# Patient Record
Sex: FEMALE | Race: WHITE | NOT HISPANIC OR LATINO | Employment: OTHER | URBAN - METROPOLITAN AREA
[De-identification: names, ages, dates, MRNs, and addresses within clinical notes are randomized per-mention and may not be internally consistent; named-entity substitution may affect disease eponyms.]

---

## 2017-12-14 ENCOUNTER — APPOINTMENT (OUTPATIENT)
Dept: PREADMISSION TESTING | Facility: HOSPITAL | Age: 72
End: 2017-12-14

## 2017-12-14 VITALS
HEART RATE: 74 BPM | HEIGHT: 64 IN | TEMPERATURE: 97.4 F | SYSTOLIC BLOOD PRESSURE: 122 MMHG | DIASTOLIC BLOOD PRESSURE: 75 MMHG | OXYGEN SATURATION: 94 % | RESPIRATION RATE: 16 BRPM | WEIGHT: 174.5 LBS | BODY MASS INDEX: 29.79 KG/M2

## 2017-12-14 LAB
ANION GAP SERPL CALCULATED.3IONS-SCNC: 13.3 MMOL/L
BUN BLD-MCNC: 14 MG/DL (ref 8–23)
BUN/CREAT SERPL: 18.2 (ref 7–25)
CALCIUM SPEC-SCNC: 9.2 MG/DL (ref 8.6–10.5)
CHLORIDE SERPL-SCNC: 100 MMOL/L (ref 98–107)
CO2 SERPL-SCNC: 23.7 MMOL/L (ref 22–29)
CREAT BLD-MCNC: 0.77 MG/DL (ref 0.57–1)
DEPRECATED RDW RBC AUTO: 42.9 FL (ref 37–54)
ERYTHROCYTE [DISTWIDTH] IN BLOOD BY AUTOMATED COUNT: 12.2 % (ref 11.7–13)
GFR SERPL CREATININE-BSD FRML MDRD: 74 ML/MIN/1.73
GLUCOSE BLD-MCNC: 176 MG/DL (ref 65–99)
HCT VFR BLD AUTO: 44.4 % (ref 35.6–45.5)
HGB BLD-MCNC: 14.9 G/DL (ref 11.9–15.5)
MCH RBC QN AUTO: 32.3 PG (ref 26.9–32)
MCHC RBC AUTO-ENTMCNC: 33.6 G/DL (ref 32.4–36.3)
MCV RBC AUTO: 96.3 FL (ref 80.5–98.2)
PLATELET # BLD AUTO: 121 10*3/MM3 (ref 140–500)
PMV BLD AUTO: 8.8 FL (ref 6–12)
POTASSIUM BLD-SCNC: 3.7 MMOL/L (ref 3.5–5.2)
RBC # BLD AUTO: 4.61 10*6/MM3 (ref 3.9–5.2)
SODIUM BLD-SCNC: 137 MMOL/L (ref 136–145)
WBC NRBC COR # BLD: 4.66 10*3/MM3 (ref 4.5–10.7)

## 2017-12-14 PROCEDURE — 93010 ELECTROCARDIOGRAM REPORT: CPT | Performed by: INTERNAL MEDICINE

## 2017-12-14 PROCEDURE — 85027 COMPLETE CBC AUTOMATED: CPT | Performed by: OPHTHALMOLOGY

## 2017-12-14 PROCEDURE — 80048 BASIC METABOLIC PNL TOTAL CA: CPT | Performed by: OPHTHALMOLOGY

## 2017-12-14 PROCEDURE — 36415 COLL VENOUS BLD VENIPUNCTURE: CPT

## 2017-12-14 PROCEDURE — 93005 ELECTROCARDIOGRAM TRACING: CPT

## 2017-12-14 RX ORDER — OSELTAMIVIR PHOSPHATE 75 MG/1
75 CAPSULE ORAL 2 TIMES DAILY
COMMUNITY
End: 2022-03-03

## 2017-12-14 RX ORDER — DEXTROMETHORPHAN HYDROBROMIDE AND PROMETHAZINE HYDROCHLORIDE 15; 6.25 MG/5ML; MG/5ML
SYRUP ORAL 4 TIMES DAILY PRN
COMMUNITY
End: 2022-03-03

## 2017-12-14 RX ORDER — CETIRIZINE HYDROCHLORIDE 10 MG/1
10 TABLET ORAL DAILY
COMMUNITY
End: 2022-03-03

## 2017-12-14 NOTE — DISCHARGE INSTRUCTIONS
Take the following medications the morning of surgery with a small sip of water:  NONE    ARRIVE AT 0800.        General Instructions:  • Do not eat solid food after midnight the night before surgery.  • You may drink clear liquids day of surgery but must stop at least one hour before your hospital arrival time.  • It is beneficial for you to have a clear drink that contains carbohydrates the day of surgery.  We suggest a 12 to 20 ounce bottle of Gatorade or Powerade for non-diabetic patients or a 12 to 20 ounce bottle of G2 or Powerade Zero for diabetic patients. (Pediatric patients, are not advised to drink a 12 to 20 ounce carbohydrate drink)    Clear liquids are liquids you can see through.  Nothing red in color.     Plain water                               Sports drinks  Sodas                                   Gelatin (Jell-O)  Fruit juices without pulp such as white grape juice and apple juice  Popsicles that contain no fruit or yogurt  Tea or coffee (no cream or milk added)  Gatorade / Powerade  G2 / Powerade Zero    • Infants may have breast milk up to four hours before surgery.  • Infants drinking formula may drink formula up to six hours before surgery.   • Patients who avoid smoking, chewing tobacco and alcohol for 4 weeks prior to surgery have a reduced risk of post-operative complications.  Quit smoking as many days before surgery as you can.  • Do not smoke, use chewing tobacco or drink alcohol the day of surgery.   • If applicable bring your C-PAP/ BI-PAP machine.  • Bring any papers given to you in the doctor’s office.  • Wear clean comfortable clothes and socks.  • Do not wear contact lenses or make-up.  Bring a case for your glasses.   • Bring crutches or walker if applicable.  • Remove all piercings.  Leave jewelry and any other valuables at home.  • The Pre-Admission Testing nurse will instruct you to bring medications if unable to obtain an accurate list in Pre-Admission Testing.        If you  were given a blood bank ID arm band remember to bring it with you the day of surgery.    Preventing a Surgical Site Infection:  • For 2 to 3 days before surgery, avoid shaving with a razor because the razor can irritate skin and make it easier to develop an infection.  • The night prior to surgery sleep in a clean bed with clean clothing.  Do not allow pets to sleep with you.  • Shower on the morning of surgery using a fresh bar of anti-bacterial soap (such as Dial) and clean washcloth.  Dry with a clean towel and dress in clean clothing.  • Ask your surgeon if you will be receiving antibiotics prior to surgery.  • Make sure you, your family, and all healthcare providers clean their hands with soap and water or an alcohol based hand  before caring for you or your wound.    Day of surgery:  Upon arrival, a Pre-op nurse and Anesthesiologist will review your health history, obtain vital signs, and answer questions you may have.  The only belongings needed at this time will be your home medications and if applicable your C-PAP/BI-PAP machine.  If you are staying overnight your family can leave the rest of your belongings in the car and bring them to your room later.  A Pre-op nurse will start an IV and you may receive medication in preparation for surgery, including something to help you relax.  Your family will be able to see you in the Pre-op area.  While you are in surgery your family should notify the waiting room  if they leave the waiting room area and provide a contact phone number.    Please be aware that surgery does come with discomfort.  We want to make every effort to control your discomfort so please discuss any uncontrolled symptoms with your nurse.   Your doctor will most likely have prescribed pain medications.      If you are going home after surgery you will receive individualized written care instructions before being discharged.  A responsible adult must drive you to and from the  hospital on the day of your surgery and stay with you for 24 hours.    If you are staying overnight following surgery, you will be transported to your hospital room following the recovery period.  [facility] has all private rooms.    If you have any questions please call Pre-Admission Testing at [phone].  Deductibles and co-payments are collected on the day of service. Please be prepared to pay the required co-pay, deductible or deposit on the day of service as defined by your plan.

## 2017-12-19 ENCOUNTER — HOSPITAL ENCOUNTER (OUTPATIENT)
Facility: HOSPITAL | Age: 72
Setting detail: HOSPITAL OUTPATIENT SURGERY
Discharge: HOME | End: 2017-12-19
Attending: OPHTHALMOLOGY | Admitting: OPHTHALMOLOGY

## 2017-12-19 ENCOUNTER — ANESTHESIA (OUTPATIENT)
Dept: PERIOP | Facility: HOSPITAL | Age: 72
End: 2017-12-19

## 2017-12-19 ENCOUNTER — ANESTHESIA EVENT (OUTPATIENT)
Dept: PERIOP | Facility: HOSPITAL | Age: 72
End: 2017-12-19

## 2017-12-19 VITALS
RESPIRATION RATE: 16 BRPM | DIASTOLIC BLOOD PRESSURE: 63 MMHG | OXYGEN SATURATION: 95 % | TEMPERATURE: 97.5 F | HEART RATE: 74 BPM | SYSTOLIC BLOOD PRESSURE: 136 MMHG

## 2017-12-19 PROCEDURE — 25010000002 ONDANSETRON PER 1 MG: Performed by: NURSE ANESTHETIST, CERTIFIED REGISTERED

## 2017-12-19 PROCEDURE — 25010000002 FENTANYL CITRATE (PF) 100 MCG/2ML SOLUTION: Performed by: NURSE ANESTHETIST, CERTIFIED REGISTERED

## 2017-12-19 PROCEDURE — 25010000002 PROPOFOL 10 MG/ML EMULSION: Performed by: NURSE ANESTHETIST, CERTIFIED REGISTERED

## 2017-12-19 PROCEDURE — 25010000002 MIDAZOLAM PER 1 MG: Performed by: ANESTHESIOLOGY

## 2017-12-19 RX ORDER — FENTANYL CITRATE 50 UG/ML
50 INJECTION, SOLUTION INTRAMUSCULAR; INTRAVENOUS
Status: DISCONTINUED | OUTPATIENT
Start: 2017-12-19 | End: 2017-12-19 | Stop reason: HOSPADM

## 2017-12-19 RX ORDER — NALOXONE HCL 0.4 MG/ML
0.4 VIAL (ML) INJECTION AS NEEDED
Status: DISCONTINUED | OUTPATIENT
Start: 2017-12-19 | End: 2017-12-19 | Stop reason: HOSPADM

## 2017-12-19 RX ORDER — HYDROCODONE BITARTRATE AND ACETAMINOPHEN 7.5; 325 MG/1; MG/1
1 TABLET ORAL ONCE AS NEEDED
Status: COMPLETED | OUTPATIENT
Start: 2017-12-19 | End: 2017-12-19

## 2017-12-19 RX ORDER — PROMETHAZINE HYDROCHLORIDE 25 MG/1
25 SUPPOSITORY RECTAL ONCE AS NEEDED
Status: DISCONTINUED | OUTPATIENT
Start: 2017-12-19 | End: 2017-12-19 | Stop reason: HOSPADM

## 2017-12-19 RX ORDER — HYDROCODONE BITARTRATE AND ACETAMINOPHEN 5; 325 MG/1; MG/1
1 TABLET ORAL EVERY 4 HOURS PRN
Qty: 15 TABLET | Refills: 0 | Status: SHIPPED | OUTPATIENT
Start: 2017-12-19 | End: 2022-03-03

## 2017-12-19 RX ORDER — PROMETHAZINE HYDROCHLORIDE 25 MG/1
25 TABLET ORAL ONCE AS NEEDED
Status: DISCONTINUED | OUTPATIENT
Start: 2017-12-19 | End: 2017-12-19 | Stop reason: HOSPADM

## 2017-12-19 RX ORDER — MAGNESIUM HYDROXIDE 1200 MG/15ML
LIQUID ORAL AS NEEDED
Status: DISCONTINUED | OUTPATIENT
Start: 2017-12-19 | End: 2017-12-19 | Stop reason: HOSPADM

## 2017-12-19 RX ORDER — HYDROCODONE BITARTRATE AND ACETAMINOPHEN 5; 325 MG/1; MG/1
1 TABLET ORAL ONCE AS NEEDED
Status: DISCONTINUED | OUTPATIENT
Start: 2017-12-19 | End: 2017-12-19 | Stop reason: HOSPADM

## 2017-12-19 RX ORDER — SODIUM CHLORIDE 0.9 % (FLUSH) 0.9 %
1-10 SYRINGE (ML) INJECTION AS NEEDED
Status: DISCONTINUED | OUTPATIENT
Start: 2017-12-19 | End: 2017-12-19 | Stop reason: HOSPADM

## 2017-12-19 RX ORDER — ONDANSETRON 2 MG/ML
4 INJECTION INTRAMUSCULAR; INTRAVENOUS ONCE AS NEEDED
Status: DISCONTINUED | OUTPATIENT
Start: 2017-12-19 | End: 2017-12-19 | Stop reason: HOSPADM

## 2017-12-19 RX ORDER — FAMOTIDINE 10 MG/ML
20 INJECTION, SOLUTION INTRAVENOUS ONCE
Status: DISCONTINUED | OUTPATIENT
Start: 2017-12-19 | End: 2017-12-19 | Stop reason: HOSPADM

## 2017-12-19 RX ORDER — DIPHENHYDRAMINE HYDROCHLORIDE 50 MG/ML
12.5 INJECTION INTRAMUSCULAR; INTRAVENOUS
Status: DISCONTINUED | OUTPATIENT
Start: 2017-12-19 | End: 2017-12-19 | Stop reason: SDUPTHER

## 2017-12-19 RX ORDER — ERYTHROMYCIN 5 MG/G
OINTMENT OPHTHALMIC 2 TIMES DAILY
Qty: 3.5 G | Refills: 0 | Status: SHIPPED | OUTPATIENT
Start: 2017-12-19 | End: 2017-12-29

## 2017-12-19 RX ORDER — PROMETHAZINE HYDROCHLORIDE 25 MG/1
25 TABLET ORAL ONCE AS NEEDED
Status: DISCONTINUED | OUTPATIENT
Start: 2017-12-19 | End: 2017-12-19 | Stop reason: SDUPTHER

## 2017-12-19 RX ORDER — MIDAZOLAM HYDROCHLORIDE 1 MG/ML
1 INJECTION INTRAMUSCULAR; INTRAVENOUS
Status: DISCONTINUED | OUTPATIENT
Start: 2017-12-19 | End: 2017-12-19 | Stop reason: HOSPADM

## 2017-12-19 RX ORDER — PROMETHAZINE HYDROCHLORIDE 25 MG/ML
6.25 INJECTION, SOLUTION INTRAMUSCULAR; INTRAVENOUS ONCE AS NEEDED
Status: DISCONTINUED | OUTPATIENT
Start: 2017-12-19 | End: 2017-12-19 | Stop reason: SDUPTHER

## 2017-12-19 RX ORDER — DIPHENHYDRAMINE HYDROCHLORIDE 50 MG/ML
12.5 INJECTION INTRAMUSCULAR; INTRAVENOUS
Status: DISCONTINUED | OUTPATIENT
Start: 2017-12-19 | End: 2017-12-19 | Stop reason: HOSPADM

## 2017-12-19 RX ORDER — PROMETHAZINE HYDROCHLORIDE 25 MG/ML
12.5 INJECTION, SOLUTION INTRAMUSCULAR; INTRAVENOUS ONCE AS NEEDED
Status: DISCONTINUED | OUTPATIENT
Start: 2017-12-19 | End: 2017-12-19 | Stop reason: HOSPADM

## 2017-12-19 RX ORDER — NALBUPHINE HCL 10 MG/ML
2 AMPUL (ML) INJECTION EVERY 4 HOURS PRN
Status: DISCONTINUED | OUTPATIENT
Start: 2017-12-19 | End: 2017-12-19 | Stop reason: HOSPADM

## 2017-12-19 RX ORDER — EPHEDRINE SULFATE 50 MG/ML
5 INJECTION, SOLUTION INTRAVENOUS ONCE AS NEEDED
Status: DISCONTINUED | OUTPATIENT
Start: 2017-12-19 | End: 2017-12-19 | Stop reason: HOSPADM

## 2017-12-19 RX ORDER — ONDANSETRON 2 MG/ML
INJECTION INTRAMUSCULAR; INTRAVENOUS AS NEEDED
Status: DISCONTINUED | OUTPATIENT
Start: 2017-12-19 | End: 2017-12-19 | Stop reason: SURG

## 2017-12-19 RX ORDER — ACETAMINOPHEN 650 MG/1
650 SUPPOSITORY RECTAL ONCE AS NEEDED
Status: DISCONTINUED | OUTPATIENT
Start: 2017-12-19 | End: 2017-12-19 | Stop reason: HOSPADM

## 2017-12-19 RX ORDER — HYDRALAZINE HYDROCHLORIDE 20 MG/ML
5 INJECTION INTRAMUSCULAR; INTRAVENOUS
Status: DISCONTINUED | OUTPATIENT
Start: 2017-12-19 | End: 2017-12-19 | Stop reason: HOSPADM

## 2017-12-19 RX ORDER — NALBUPHINE HCL 10 MG/ML
10 AMPUL (ML) INJECTION EVERY 4 HOURS PRN
Status: DISCONTINUED | OUTPATIENT
Start: 2017-12-19 | End: 2017-12-19 | Stop reason: HOSPADM

## 2017-12-19 RX ORDER — EPHEDRINE SULFATE 50 MG/ML
INJECTION, SOLUTION INTRAVENOUS AS NEEDED
Status: DISCONTINUED | OUTPATIENT
Start: 2017-12-19 | End: 2017-12-19 | Stop reason: SURG

## 2017-12-19 RX ORDER — PROPOFOL 10 MG/ML
VIAL (ML) INTRAVENOUS AS NEEDED
Status: DISCONTINUED | OUTPATIENT
Start: 2017-12-19 | End: 2017-12-19 | Stop reason: SURG

## 2017-12-19 RX ORDER — LIDOCAINE HYDROCHLORIDE 10 MG/ML
0.5 INJECTION, SOLUTION EPIDURAL; INFILTRATION; INTRACAUDAL; PERINEURAL ONCE AS NEEDED
Status: COMPLETED | OUTPATIENT
Start: 2017-12-19 | End: 2017-12-19

## 2017-12-19 RX ORDER — FENTANYL CITRATE 50 UG/ML
INJECTION, SOLUTION INTRAMUSCULAR; INTRAVENOUS AS NEEDED
Status: DISCONTINUED | OUTPATIENT
Start: 2017-12-19 | End: 2017-12-19 | Stop reason: SURG

## 2017-12-19 RX ORDER — MIDAZOLAM HYDROCHLORIDE 1 MG/ML
2 INJECTION INTRAMUSCULAR; INTRAVENOUS
Status: DISCONTINUED | OUTPATIENT
Start: 2017-12-19 | End: 2017-12-19 | Stop reason: HOSPADM

## 2017-12-19 RX ORDER — NALOXONE HCL 0.4 MG/ML
0.2 VIAL (ML) INJECTION AS NEEDED
Status: DISCONTINUED | OUTPATIENT
Start: 2017-12-19 | End: 2017-12-19 | Stop reason: HOSPADM

## 2017-12-19 RX ORDER — PROMETHAZINE HYDROCHLORIDE 25 MG/1
25 SUPPOSITORY RECTAL ONCE AS NEEDED
Status: DISCONTINUED | OUTPATIENT
Start: 2017-12-19 | End: 2017-12-19 | Stop reason: SDUPTHER

## 2017-12-19 RX ORDER — FLUMAZENIL 0.1 MG/ML
0.2 INJECTION INTRAVENOUS AS NEEDED
Status: DISCONTINUED | OUTPATIENT
Start: 2017-12-19 | End: 2017-12-19 | Stop reason: HOSPADM

## 2017-12-19 RX ORDER — BALANCED SALT SOLUTION 6.4; .75; .48; .3; 3.9; 1.7 MG/ML; MG/ML; MG/ML; MG/ML; MG/ML; MG/ML
SOLUTION OPHTHALMIC AS NEEDED
Status: DISCONTINUED | OUTPATIENT
Start: 2017-12-19 | End: 2017-12-19 | Stop reason: HOSPADM

## 2017-12-19 RX ORDER — PROMETHAZINE HYDROCHLORIDE 25 MG/1
12.5 TABLET ORAL ONCE AS NEEDED
Status: DISCONTINUED | OUTPATIENT
Start: 2017-12-19 | End: 2017-12-19 | Stop reason: HOSPADM

## 2017-12-19 RX ORDER — OXYCODONE AND ACETAMINOPHEN 7.5; 325 MG/1; MG/1
1 TABLET ORAL ONCE AS NEEDED
Status: DISCONTINUED | OUTPATIENT
Start: 2017-12-19 | End: 2017-12-19 | Stop reason: HOSPADM

## 2017-12-19 RX ORDER — SODIUM CHLORIDE, SODIUM LACTATE, POTASSIUM CHLORIDE, CALCIUM CHLORIDE 600; 310; 30; 20 MG/100ML; MG/100ML; MG/100ML; MG/100ML
9 INJECTION, SOLUTION INTRAVENOUS CONTINUOUS
Status: DISCONTINUED | OUTPATIENT
Start: 2017-12-19 | End: 2017-12-19 | Stop reason: SDUPTHER

## 2017-12-19 RX ORDER — SODIUM CHLORIDE, SODIUM LACTATE, POTASSIUM CHLORIDE, CALCIUM CHLORIDE 600; 310; 30; 20 MG/100ML; MG/100ML; MG/100ML; MG/100ML
9 INJECTION, SOLUTION INTRAVENOUS CONTINUOUS
Status: DISCONTINUED | OUTPATIENT
Start: 2017-12-19 | End: 2017-12-19 | Stop reason: HOSPADM

## 2017-12-19 RX ORDER — FAMOTIDINE 10 MG/ML
20 INJECTION, SOLUTION INTRAVENOUS ONCE
Status: COMPLETED | OUTPATIENT
Start: 2017-12-19 | End: 2017-12-19

## 2017-12-19 RX ORDER — ACETAMINOPHEN 325 MG/1
650 TABLET ORAL ONCE
Status: DISCONTINUED | OUTPATIENT
Start: 2017-12-19 | End: 2017-12-19 | Stop reason: HOSPADM

## 2017-12-19 RX ORDER — OXYCODONE HYDROCHLORIDE AND ACETAMINOPHEN 5; 325 MG/1; MG/1
1 TABLET ORAL ONCE AS NEEDED
Status: DISCONTINUED | OUTPATIENT
Start: 2017-12-19 | End: 2017-12-19 | Stop reason: HOSPADM

## 2017-12-19 RX ORDER — FENTANYL CITRATE 50 UG/ML
50 INJECTION, SOLUTION INTRAMUSCULAR; INTRAVENOUS
Status: DISCONTINUED | OUTPATIENT
Start: 2017-12-19 | End: 2017-12-19 | Stop reason: SDUPTHER

## 2017-12-19 RX ORDER — LIDOCAINE HYDROCHLORIDE 20 MG/ML
INJECTION, SOLUTION INFILTRATION; PERINEURAL AS NEEDED
Status: DISCONTINUED | OUTPATIENT
Start: 2017-12-19 | End: 2017-12-19 | Stop reason: SURG

## 2017-12-19 RX ORDER — ERYTHROMYCIN 5 MG/G
OINTMENT OPHTHALMIC AS NEEDED
Status: DISCONTINUED | OUTPATIENT
Start: 2017-12-19 | End: 2017-12-19 | Stop reason: HOSPADM

## 2017-12-19 RX ORDER — ACETAMINOPHEN 325 MG/1
650 TABLET ORAL ONCE AS NEEDED
Status: DISCONTINUED | OUTPATIENT
Start: 2017-12-19 | End: 2017-12-19 | Stop reason: HOSPADM

## 2017-12-19 RX ORDER — HYDRALAZINE HYDROCHLORIDE 20 MG/ML
5 INJECTION INTRAMUSCULAR; INTRAVENOUS
Status: DISCONTINUED | OUTPATIENT
Start: 2017-12-19 | End: 2017-12-19 | Stop reason: SDUPTHER

## 2017-12-19 RX ORDER — LABETALOL HYDROCHLORIDE 5 MG/ML
5 INJECTION, SOLUTION INTRAVENOUS
Status: DISCONTINUED | OUTPATIENT
Start: 2017-12-19 | End: 2017-12-19 | Stop reason: HOSPADM

## 2017-12-19 RX ADMIN — FENTANYL CITRATE 25 MCG: 50 INJECTION INTRAMUSCULAR; INTRAVENOUS at 11:42

## 2017-12-19 RX ADMIN — EPHEDRINE SULFATE 10 MG: 50 INJECTION INTRAMUSCULAR; INTRAVENOUS; SUBCUTANEOUS at 11:58

## 2017-12-19 RX ADMIN — SODIUM CHLORIDE, POTASSIUM CHLORIDE, SODIUM LACTATE AND CALCIUM CHLORIDE: 600; 310; 30; 20 INJECTION, SOLUTION INTRAVENOUS at 12:21

## 2017-12-19 RX ADMIN — EPHEDRINE SULFATE 10 MG: 50 INJECTION INTRAMUSCULAR; INTRAVENOUS; SUBCUTANEOUS at 12:16

## 2017-12-19 RX ADMIN — FENTANYL CITRATE 25 MCG: 50 INJECTION INTRAMUSCULAR; INTRAVENOUS at 12:29

## 2017-12-19 RX ADMIN — LIDOCAINE HYDROCHLORIDE 100 MG: 20 INJECTION, SOLUTION INFILTRATION; PERINEURAL at 11:36

## 2017-12-19 RX ADMIN — FENTANYL CITRATE 25 MCG: 50 INJECTION INTRAMUSCULAR; INTRAVENOUS at 11:36

## 2017-12-19 RX ADMIN — SODIUM CHLORIDE, POTASSIUM CHLORIDE, SODIUM LACTATE AND CALCIUM CHLORIDE 9 ML/HR: 600; 310; 30; 20 INJECTION, SOLUTION INTRAVENOUS at 09:22

## 2017-12-19 RX ADMIN — EPHEDRINE SULFATE 10 MG: 50 INJECTION INTRAMUSCULAR; INTRAVENOUS; SUBCUTANEOUS at 12:53

## 2017-12-19 RX ADMIN — HYDROCODONE BITARTRATE AND ACETAMINOPHEN 1 TABLET: 7.5; 325 TABLET ORAL at 13:27

## 2017-12-19 RX ADMIN — PROPOFOL 150 MG: 10 INJECTION, EMULSION INTRAVENOUS at 11:36

## 2017-12-19 RX ADMIN — FENTANYL CITRATE 25 MCG: 50 INJECTION INTRAMUSCULAR; INTRAVENOUS at 12:10

## 2017-12-19 RX ADMIN — ONDANSETRON 4 MG: 2 INJECTION INTRAMUSCULAR; INTRAVENOUS at 12:48

## 2017-12-19 RX ADMIN — EPHEDRINE SULFATE 10 MG: 50 INJECTION INTRAMUSCULAR; INTRAVENOUS; SUBCUTANEOUS at 11:49

## 2017-12-19 RX ADMIN — LIDOCAINE HYDROCHLORIDE 0.5 ML: 10 INJECTION, SOLUTION EPIDURAL; INFILTRATION; INTRACAUDAL; PERINEURAL at 09:22

## 2017-12-19 RX ADMIN — FAMOTIDINE 20 MG: 10 INJECTION, SOLUTION INTRAVENOUS at 10:59

## 2017-12-19 RX ADMIN — EPHEDRINE SULFATE 10 MG: 50 INJECTION INTRAMUSCULAR; INTRAVENOUS; SUBCUTANEOUS at 11:51

## 2017-12-19 RX ADMIN — Medication 1 MG: at 10:58

## 2017-12-19 NOTE — ANESTHESIA PREPROCEDURE EVALUATION
Anesthesia Evaluation     Patient summary reviewed and Nursing notes reviewed   NPO Solid Status: > 8 hours       Airway   Mallampati: II  TM distance: >3 FB  Neck ROM: full  Dental - normal exam     Pulmonary - negative pulmonary ROS and normal exam   Cardiovascular - negative cardio ROS and normal exam        Neuro/Psych- negative ROS  GI/Hepatic/Renal/Endo    (+) obesity,      Musculoskeletal     Abdominal    Substance History - negative use     OB/GYN negative ob/gyn ROS         Other   (+) arthritis                                     Anesthesia Plan    ASA 2     general   (Surgeon requests GA not MAC.)  Anesthetic plan and risks discussed with patient.

## 2017-12-19 NOTE — ANESTHESIA PROCEDURE NOTES
Airway  Urgency: elective    Date/Time: 12/19/2017 11:38 AM  Airway not difficult    General Information and Staff    Patient location during procedure: OR  Anesthesiologist: RENDER, ZHANG RAY  CRNA: IAM ORDOÑEZ    Indications and Patient Condition  Indications for airway management: airway protection    Preoxygenated: yes  MILS maintained throughout  Mask difficulty assessment: 1 - vent by mask    Final Airway Details  Final airway type: supraglottic airway      Successful airway: classic  Size 4    Number of attempts at approach: 1    Additional Comments  Patient in OR. Monitors on. BLVS. Pre 02 100%. SIVI. LMA placed with ease. No leak present. BBS and ETCO2 present. Secured. Teeth/lips as preop.

## 2017-12-19 NOTE — ANESTHESIA POSTPROCEDURE EVALUATION
Patient: Maryam Sandoval    Procedure Summary     Date Anesthesia Start Anesthesia Stop Room / Location    12/19/17 1129 1305  [facility]  /  [facility]       Procedure Diagnosis Surgeon Provider    RIGHT LOWER LID ENTROPION REPAIR, LEFT LOWER LID ECTROPION REPAIR (Bilateral Eye); BILATERAL  BLEPHAROPLASTY LOWER LID  (Bilateral Eye) No diagnosis on file. MD Joaquín Connors MD          Anesthesia Type: general  Last vitals  BP   129/66 (12/19/17 1350)   Temp   36.4 °C (97.5 °F) (12/19/17 1330)   Pulse   80 (12/19/17 1350)   Resp   16 (12/19/17 1350)     SpO2   96 % (12/19/17 1350)     Post Anesthesia Care and Evaluation    Patient location during evaluation: bedside  Patient participation: complete - patient participated  Level of consciousness: awake  Pain score: 2  Pain management: adequate  Airway patency: patent  Anesthetic complications: No anesthetic complications  PONV Status: none  Cardiovascular status: acceptable  Respiratory status: acceptable  Hydration status: acceptable    Comments: /66 (BP Location: Right arm, Patient Position: Lying)  Pulse 80  Temp 36.4 °C (97.5 °F)  Resp 16  SpO2 96%

## 2017-12-28 ENCOUNTER — CONVERSION ENCOUNTER (OUTPATIENT)
Dept: GENERAL RADIOLOGY | Facility: HOSPITAL | Age: 72
End: 2017-12-28

## 2018-06-04 ENCOUNTER — CONVERSION ENCOUNTER (OUTPATIENT)
Dept: SURGERY | Facility: CLINIC | Age: 73
End: 2018-06-04

## 2018-06-04 ENCOUNTER — OFFICE VISIT CONVERTED (OUTPATIENT)
Dept: SURGERY | Facility: CLINIC | Age: 73
End: 2018-06-04
Attending: SURGERY

## 2018-07-12 ENCOUNTER — OFFICE VISIT CONVERTED (OUTPATIENT)
Dept: SURGERY | Facility: CLINIC | Age: 73
End: 2018-07-12
Attending: SURGERY

## 2018-07-12 ENCOUNTER — CONVERSION ENCOUNTER (OUTPATIENT)
Dept: SURGERY | Facility: CLINIC | Age: 73
End: 2018-07-12

## 2019-01-07 ENCOUNTER — HOSPITAL ENCOUNTER (OUTPATIENT)
Dept: GENERAL RADIOLOGY | Facility: HOSPITAL | Age: 74
Discharge: HOME | End: 2019-01-07
Attending: INTERNAL MEDICINE

## 2019-01-31 ENCOUNTER — HOSPITAL ENCOUNTER (OUTPATIENT)
Dept: LAB | Facility: HOSPITAL | Age: 74
Discharge: HOME | End: 2019-01-31
Attending: INTERNAL MEDICINE

## 2019-01-31 LAB
25(OH)D3 SERPL-MCNC: 23.8 NG/ML (ref 30–100)
ALBUMIN SERPL-MCNC: 4.3 G/DL (ref 3.5–5)
ALBUMIN/GLOB SERPL: 1.6 {RATIO} (ref 1.4–2.6)
ALP SERPL-CCNC: 95 U/L (ref 43–160)
ALT SERPL-CCNC: 20 U/L (ref 10–40)
ANION GAP SERPL CALC-SCNC: 17 MMOL/L (ref 8–19)
AST SERPL-CCNC: 16 U/L (ref 15–50)
BASOPHILS # BLD AUTO: 0.06 10*3/UL (ref 0–0.2)
BASOPHILS NFR BLD AUTO: 0.79 % (ref 0–3)
BILIRUB SERPL-MCNC: 0.69 MG/DL (ref 0.2–1.3)
BUN SERPL-MCNC: 21 MG/DL (ref 5–25)
BUN/CREAT SERPL: 25 {RATIO} (ref 6–20)
CALCIUM SERPL-MCNC: 9.7 MG/DL (ref 8.7–10.4)
CHLORIDE SERPL-SCNC: 104 MMOL/L (ref 99–111)
CHOLEST SERPL-MCNC: 173 MG/DL (ref 107–200)
CHOLEST/HDLC SERPL: 3.9 {RATIO} (ref 3–6)
CONV CO2: 27 MMOL/L (ref 22–32)
CONV TOTAL PROTEIN: 7 G/DL (ref 6.3–8.2)
CREAT UR-MCNC: 0.84 MG/DL (ref 0.5–0.9)
EOSINOPHIL # BLD AUTO: 0.07 10*3/UL (ref 0–0.7)
EOSINOPHIL # BLD AUTO: 0.9 % (ref 0–7)
ERYTHROCYTE [DISTWIDTH] IN BLOOD BY AUTOMATED COUNT: 11.2 % (ref 11.5–14.5)
GFR SERPLBLD BASED ON 1.73 SQ M-ARVRAT: >60 ML/MIN/{1.73_M2}
GLOBULIN UR ELPH-MCNC: 2.7 G/DL (ref 2–3.5)
GLUCOSE SERPL-MCNC: 88 MG/DL (ref 65–99)
HBA1C MFR BLD: 14.8 G/DL (ref 12–16)
HCT VFR BLD AUTO: 41.7 % (ref 37–47)
HDLC SERPL-MCNC: 44 MG/DL (ref 40–60)
LDLC SERPL CALC-MCNC: 100 MG/DL (ref 70–100)
LYMPHOCYTES # BLD AUTO: 2.55 10*3/UL (ref 1–5)
MCH RBC QN AUTO: 33 PG (ref 27–31)
MCHC RBC AUTO-ENTMCNC: 35.4 G/DL (ref 33–37)
MCV RBC AUTO: 93.1 FL (ref 81–99)
MONOCYTES # BLD AUTO: 0.69 10*3/UL (ref 0.2–1.2)
MONOCYTES NFR BLD AUTO: 8.96 % (ref 3–10)
NEUTROPHILS # BLD AUTO: 4.33 10*3/UL (ref 2–8)
NEUTROPHILS NFR BLD AUTO: 56.3 % (ref 30–85)
NRBC BLD AUTO-RTO: 0 % (ref 0–0.01)
OSMOLALITY SERPL CALC.SUM OF ELEC: 300 MOSM/KG (ref 273–304)
PLATELET # BLD AUTO: 237 10*3/UL (ref 130–400)
PMV BLD AUTO: 5.9 FL (ref 7.4–10.4)
POTASSIUM SERPL-SCNC: 4.4 MMOL/L (ref 3.5–5.3)
RBC # BLD AUTO: 4.48 10*6/UL (ref 4.2–5.4)
SODIUM SERPL-SCNC: 144 MMOL/L (ref 135–147)
T4 FREE SERPL-MCNC: 1.3 NG/DL (ref 0.9–1.8)
TRIGL SERPL-MCNC: 145 MG/DL (ref 40–150)
TSH SERPL-ACNC: 1.54 M[IU]/L (ref 0.27–4.2)
VARIANT LYMPHS NFR BLD MANUAL: 33.1 % (ref 20–45)
VLDLC SERPL-MCNC: 29 MG/DL (ref 5–37)
WBC # BLD AUTO: 7.69 10*3/UL (ref 4.8–10.8)

## 2019-06-14 ENCOUNTER — HOSPITAL ENCOUNTER (OUTPATIENT)
Dept: LAB | Facility: HOSPITAL | Age: 74
Discharge: HOME | End: 2019-06-14
Attending: INTERNAL MEDICINE

## 2019-06-14 LAB
25(OH)D3 SERPL-MCNC: 35.8 NG/ML (ref 30–100)
ALBUMIN SERPL-MCNC: 4.4 G/DL (ref 3.5–5)
ALBUMIN/GLOB SERPL: 1.7 {RATIO} (ref 1.4–2.6)
ALP SERPL-CCNC: 92 U/L (ref 43–160)
ALT SERPL-CCNC: 18 U/L (ref 10–40)
ANION GAP SERPL CALC-SCNC: 16 MMOL/L (ref 8–19)
AST SERPL-CCNC: 20 U/L (ref 15–50)
BASOPHILS # BLD AUTO: 0.04 10*3/UL (ref 0–0.2)
BASOPHILS NFR BLD AUTO: 0.6 % (ref 0–3)
BILIRUB SERPL-MCNC: 0.59 MG/DL (ref 0.2–1.3)
BUN SERPL-MCNC: 12 MG/DL (ref 5–25)
BUN/CREAT SERPL: 15 {RATIO} (ref 6–20)
CALCIUM SERPL-MCNC: 9.8 MG/DL (ref 8.7–10.4)
CHLORIDE SERPL-SCNC: 105 MMOL/L (ref 99–111)
CHOLEST SERPL-MCNC: 140 MG/DL (ref 107–200)
CHOLEST/HDLC SERPL: 3.4 {RATIO} (ref 3–6)
CONV ABS IMM GRAN: 0.02 10*3/UL (ref 0–0.2)
CONV CO2: 27 MMOL/L (ref 22–32)
CONV IMMATURE GRAN: 0.3 % (ref 0–1.8)
CONV TOTAL PROTEIN: 7 G/DL (ref 6.3–8.2)
CREAT UR-MCNC: 0.82 MG/DL (ref 0.5–0.9)
DEPRECATED RDW RBC AUTO: 41.4 FL (ref 36.4–46.3)
EOSINOPHIL # BLD AUTO: 0.17 10*3/UL (ref 0–0.7)
EOSINOPHIL # BLD AUTO: 2.4 % (ref 0–7)
ERYTHROCYTE [DISTWIDTH] IN BLOOD BY AUTOMATED COUNT: 11.8 % (ref 11.7–14.4)
GFR SERPLBLD BASED ON 1.73 SQ M-ARVRAT: >60 ML/MIN/{1.73_M2}
GLOBULIN UR ELPH-MCNC: 2.6 G/DL (ref 2–3.5)
GLUCOSE SERPL-MCNC: 91 MG/DL (ref 65–99)
HBA1C MFR BLD: 14.8 G/DL (ref 12–16)
HCT VFR BLD AUTO: 43.6 % (ref 37–47)
HDLC SERPL-MCNC: 41 MG/DL (ref 40–60)
LDLC SERPL CALC-MCNC: 70 MG/DL (ref 70–100)
LYMPHOCYTES # BLD AUTO: 2.4 10*3/UL (ref 1–5)
MCH RBC QN AUTO: 32.7 PG (ref 27–31)
MCHC RBC AUTO-ENTMCNC: 33.9 G/DL (ref 33–37)
MCV RBC AUTO: 96.2 FL (ref 81–99)
MONOCYTES # BLD AUTO: 0.76 10*3/UL (ref 0.2–1.2)
MONOCYTES NFR BLD AUTO: 10.6 % (ref 3–10)
NEUTROPHILS # BLD AUTO: 3.76 10*3/UL (ref 2–8)
NEUTROPHILS NFR BLD AUTO: 52.5 % (ref 30–85)
NRBC CBCN: 0 % (ref 0–0.7)
OSMOLALITY SERPL CALC.SUM OF ELEC: 297 MOSM/KG (ref 273–304)
PLATELET # BLD AUTO: 206 10*3/UL (ref 130–400)
PMV BLD AUTO: 9 FL (ref 9.4–12.3)
POTASSIUM SERPL-SCNC: 4.2 MMOL/L (ref 3.5–5.3)
RBC # BLD AUTO: 4.53 10*6/UL (ref 4.2–5.4)
SODIUM SERPL-SCNC: 144 MMOL/L (ref 135–147)
T4 FREE SERPL-MCNC: 1.1 NG/DL (ref 0.9–1.8)
TRIGL SERPL-MCNC: 144 MG/DL (ref 40–150)
TSH SERPL-ACNC: 1.17 M[IU]/L (ref 0.27–4.2)
VARIANT LYMPHS NFR BLD MANUAL: 33.6 % (ref 20–45)
VLDLC SERPL-MCNC: 29 MG/DL (ref 5–37)
WBC # BLD AUTO: 7.15 10*3/UL (ref 4.8–10.8)

## 2019-08-29 ENCOUNTER — HOSPITAL ENCOUNTER (OUTPATIENT)
Dept: CARDIOLOGY | Facility: HOSPITAL | Age: 74
Discharge: HOME | End: 2019-08-29
Attending: INTERNAL MEDICINE

## 2019-08-29 LAB
CREAT BLD-MCNC: 0.7 MG/DL (ref 0.6–1.4)
GFR SERPLBLD BASED ON 1.73 SQ M-ARVRAT: >60 ML/MIN/{1.73_M2}

## 2019-10-21 ENCOUNTER — HOSPITAL ENCOUNTER (OUTPATIENT)
Dept: LAB | Facility: HOSPITAL | Age: 74
Discharge: HOME | End: 2019-10-21
Attending: INTERNAL MEDICINE

## 2019-10-21 LAB
25(OH)D3 SERPL-MCNC: 28.4 NG/ML (ref 30–100)
ALBUMIN SERPL-MCNC: 4.3 G/DL (ref 3.5–5)
ALBUMIN/GLOB SERPL: 1.7 {RATIO} (ref 1.4–2.6)
ALP SERPL-CCNC: 113 U/L (ref 43–160)
ALT SERPL-CCNC: 18 U/L (ref 10–40)
ANION GAP SERPL CALC-SCNC: 16 MMOL/L (ref 8–19)
AST SERPL-CCNC: 20 U/L (ref 15–50)
BASOPHILS # BLD AUTO: 0.04 10*3/UL (ref 0–0.2)
BASOPHILS NFR BLD AUTO: 0.5 % (ref 0–3)
BILIRUB SERPL-MCNC: 0.39 MG/DL (ref 0.2–1.3)
BUN SERPL-MCNC: 15 MG/DL (ref 5–25)
BUN/CREAT SERPL: 23 {RATIO} (ref 6–20)
CALCIUM SERPL-MCNC: 10 MG/DL (ref 8.7–10.4)
CHLORIDE SERPL-SCNC: 103 MMOL/L (ref 99–111)
CONV ABS IMM GRAN: 0.02 10*3/UL (ref 0–0.2)
CONV CO2: 26 MMOL/L (ref 22–32)
CONV IMMATURE GRAN: 0.3 % (ref 0–1.8)
CONV TOTAL PROTEIN: 6.9 G/DL (ref 6.3–8.2)
CREAT UR-MCNC: 0.66 MG/DL (ref 0.5–0.9)
DEPRECATED RDW RBC AUTO: 41.9 FL (ref 36.4–46.3)
EOSINOPHIL # BLD AUTO: 0.21 10*3/UL (ref 0–0.7)
EOSINOPHIL # BLD AUTO: 2.6 % (ref 0–7)
ERYTHROCYTE [DISTWIDTH] IN BLOOD BY AUTOMATED COUNT: 11.8 % (ref 11.7–14.4)
FOLATE SERPL-MCNC: >20 NG/ML (ref 4.8–20)
GFR SERPLBLD BASED ON 1.73 SQ M-ARVRAT: >60 ML/MIN/{1.73_M2}
GLOBULIN UR ELPH-MCNC: 2.6 G/DL (ref 2–3.5)
GLUCOSE SERPL-MCNC: 98 MG/DL (ref 65–99)
HCT VFR BLD AUTO: 42.9 % (ref 37–47)
HGB BLD-MCNC: 14.4 G/DL (ref 12–16)
LYMPHOCYTES # BLD AUTO: 2.64 10*3/UL (ref 1–5)
LYMPHOCYTES NFR BLD AUTO: 33.1 % (ref 20–45)
MCH RBC QN AUTO: 32.4 PG (ref 27–31)
MCHC RBC AUTO-ENTMCNC: 33.6 G/DL (ref 33–37)
MCV RBC AUTO: 96.4 FL (ref 81–99)
MONOCYTES # BLD AUTO: 0.9 10*3/UL (ref 0.2–1.2)
MONOCYTES NFR BLD AUTO: 11.3 % (ref 3–10)
NEUTROPHILS # BLD AUTO: 4.17 10*3/UL (ref 2–8)
NEUTROPHILS NFR BLD AUTO: 52.2 % (ref 30–85)
NRBC CBCN: 0 % (ref 0–0.7)
OSMOLALITY SERPL CALC.SUM OF ELEC: 293 MOSM/KG (ref 273–304)
PLATELET # BLD AUTO: 224 10*3/UL (ref 130–400)
PMV BLD AUTO: 9 FL (ref 9.4–12.3)
POTASSIUM SERPL-SCNC: 4.2 MMOL/L (ref 3.5–5.3)
RBC # BLD AUTO: 4.45 10*6/UL (ref 4.2–5.4)
SODIUM SERPL-SCNC: 141 MMOL/L (ref 135–147)
VIT B12 SERPL-MCNC: 909 PG/ML (ref 211–911)
WBC # BLD AUTO: 7.98 10*3/UL (ref 4.8–10.8)

## 2020-01-13 ENCOUNTER — HOSPITAL ENCOUNTER (OUTPATIENT)
Dept: GENERAL RADIOLOGY | Facility: HOSPITAL | Age: 75
Discharge: HOME | End: 2020-01-13
Attending: INTERNAL MEDICINE

## 2020-02-21 ENCOUNTER — HOSPITAL ENCOUNTER (OUTPATIENT)
Dept: LAB | Facility: HOSPITAL | Age: 75
Discharge: HOME | End: 2020-02-21
Attending: INTERNAL MEDICINE

## 2020-02-21 LAB
25(OH)D3 SERPL-MCNC: 26.3 NG/ML (ref 30–100)
ALBUMIN SERPL-MCNC: 4.4 G/DL (ref 3.5–5)
ALBUMIN/GLOB SERPL: 1.8 {RATIO} (ref 1.4–2.6)
ALP SERPL-CCNC: 119 U/L (ref 43–160)
ALT SERPL-CCNC: 20 U/L (ref 10–40)
ANION GAP SERPL CALC-SCNC: 17 MMOL/L (ref 8–19)
AST SERPL-CCNC: 19 U/L (ref 15–50)
BASOPHILS # BLD AUTO: 0.08 10*3/UL (ref 0–0.2)
BASOPHILS NFR BLD AUTO: 1 % (ref 0–3)
BILIRUB SERPL-MCNC: 0.46 MG/DL (ref 0.2–1.3)
BUN SERPL-MCNC: 13 MG/DL (ref 5–25)
BUN/CREAT SERPL: 18 {RATIO} (ref 6–20)
CALCIUM SERPL-MCNC: 10 MG/DL (ref 8.7–10.4)
CHLORIDE SERPL-SCNC: 103 MMOL/L (ref 99–111)
CHOLEST SERPL-MCNC: 161 MG/DL (ref 107–200)
CHOLEST/HDLC SERPL: 3.2 {RATIO} (ref 3–6)
CONV ABS IMM GRAN: 0.03 10*3/UL (ref 0–0.2)
CONV CO2: 25 MMOL/L (ref 22–32)
CONV IMMATURE GRAN: 0.4 % (ref 0–1.8)
CONV TOTAL PROTEIN: 6.8 G/DL (ref 6.3–8.2)
CREAT UR-MCNC: 0.72 MG/DL (ref 0.5–0.9)
DEPRECATED RDW RBC AUTO: 40.5 FL (ref 36.4–46.3)
EOSINOPHIL # BLD AUTO: 0.22 10*3/UL (ref 0–0.7)
EOSINOPHIL # BLD AUTO: 2.7 % (ref 0–7)
ERYTHROCYTE [DISTWIDTH] IN BLOOD BY AUTOMATED COUNT: 11.8 % (ref 11.7–14.4)
FOLATE SERPL-MCNC: >20 NG/ML (ref 4.8–20)
GFR SERPLBLD BASED ON 1.73 SQ M-ARVRAT: >60 ML/MIN/{1.73_M2}
GLOBULIN UR ELPH-MCNC: 2.4 G/DL (ref 2–3.5)
GLUCOSE SERPL-MCNC: 90 MG/DL (ref 65–99)
HCT VFR BLD AUTO: 44.3 % (ref 37–47)
HDLC SERPL-MCNC: 50 MG/DL (ref 40–60)
HGB BLD-MCNC: 14.9 G/DL (ref 12–16)
LDLC SERPL CALC-MCNC: 85 MG/DL (ref 70–100)
LYMPHOCYTES # BLD AUTO: 2.35 10*3/UL (ref 1–5)
LYMPHOCYTES NFR BLD AUTO: 28.6 % (ref 20–45)
MCH RBC QN AUTO: 31.8 PG (ref 27–31)
MCHC RBC AUTO-ENTMCNC: 33.6 G/DL (ref 33–37)
MCV RBC AUTO: 94.5 FL (ref 81–99)
MONOCYTES # BLD AUTO: 0.78 10*3/UL (ref 0.2–1.2)
MONOCYTES NFR BLD AUTO: 9.5 % (ref 3–10)
NEUTROPHILS # BLD AUTO: 4.76 10*3/UL (ref 2–8)
NEUTROPHILS NFR BLD AUTO: 57.8 % (ref 30–85)
NRBC CBCN: 0 % (ref 0–0.7)
OSMOLALITY SERPL CALC.SUM OF ELEC: 292 MOSM/KG (ref 273–304)
PLATELET # BLD AUTO: 208 10*3/UL (ref 130–400)
PMV BLD AUTO: 9 FL (ref 9.4–12.3)
POTASSIUM SERPL-SCNC: 4.2 MMOL/L (ref 3.5–5.3)
RBC # BLD AUTO: 4.69 10*6/UL (ref 4.2–5.4)
SODIUM SERPL-SCNC: 141 MMOL/L (ref 135–147)
TRIGL SERPL-MCNC: 132 MG/DL (ref 40–150)
VIT B12 SERPL-MCNC: 934 PG/ML (ref 211–911)
VLDLC SERPL-MCNC: 26 MG/DL (ref 5–37)
WBC # BLD AUTO: 8.22 10*3/UL (ref 4.8–10.8)

## 2020-09-18 ENCOUNTER — HOSPITAL ENCOUNTER (OUTPATIENT)
Dept: LAB | Facility: HOSPITAL | Age: 75
Discharge: HOME | End: 2020-09-18
Attending: INTERNAL MEDICINE

## 2020-09-18 LAB
25(OH)D3 SERPL-MCNC: 25.4 NG/ML (ref 30–100)
ALBUMIN SERPL-MCNC: 4.7 G/DL (ref 3.5–5)
ALBUMIN/GLOB SERPL: 2 {RATIO} (ref 1.4–2.6)
ALP SERPL-CCNC: 120 U/L (ref 43–160)
ALT SERPL-CCNC: 28 U/L (ref 10–40)
ANION GAP SERPL CALC-SCNC: 16 MMOL/L (ref 8–19)
AST SERPL-CCNC: 25 U/L (ref 15–50)
BASOPHILS # BLD AUTO: 0.07 10*3/UL (ref 0–0.2)
BASOPHILS NFR BLD AUTO: 0.8 % (ref 0–3)
BILIRUB SERPL-MCNC: 0.47 MG/DL (ref 0.2–1.3)
BUN SERPL-MCNC: 19 MG/DL (ref 5–25)
BUN/CREAT SERPL: 23 {RATIO} (ref 6–20)
CALCIUM SERPL-MCNC: 10.4 MG/DL (ref 8.7–10.4)
CHLORIDE SERPL-SCNC: 106 MMOL/L (ref 99–111)
CHOLEST SERPL-MCNC: 163 MG/DL (ref 107–200)
CHOLEST/HDLC SERPL: 3.6 {RATIO} (ref 3–6)
CONV ABS IMM GRAN: 0.03 10*3/UL (ref 0–0.2)
CONV CO2: 26 MMOL/L (ref 22–32)
CONV IMMATURE GRAN: 0.4 % (ref 0–1.8)
CONV TOTAL PROTEIN: 7.1 G/DL (ref 6.3–8.2)
CREAT UR-MCNC: 0.83 MG/DL (ref 0.5–0.9)
DEPRECATED RDW RBC AUTO: 43.4 FL (ref 36.4–46.3)
EOSINOPHIL # BLD AUTO: 0.15 10*3/UL (ref 0–0.7)
EOSINOPHIL # BLD AUTO: 1.8 % (ref 0–7)
ERYTHROCYTE [DISTWIDTH] IN BLOOD BY AUTOMATED COUNT: 12 % (ref 11.7–14.4)
FOLATE SERPL-MCNC: >20 NG/ML (ref 4.8–20)
GFR SERPLBLD BASED ON 1.73 SQ M-ARVRAT: >60 ML/MIN/{1.73_M2}
GLOBULIN UR ELPH-MCNC: 2.4 G/DL (ref 2–3.5)
GLUCOSE SERPL-MCNC: 100 MG/DL (ref 65–99)
HCT VFR BLD AUTO: 46.6 % (ref 37–47)
HDLC SERPL-MCNC: 45 MG/DL (ref 40–60)
HGB BLD-MCNC: 15.5 G/DL (ref 12–16)
LDLC SERPL CALC-MCNC: 78 MG/DL (ref 70–100)
LYMPHOCYTES # BLD AUTO: 2.9 10*3/UL (ref 1–5)
LYMPHOCYTES NFR BLD AUTO: 34.4 % (ref 20–45)
MAGNESIUM SERPL-MCNC: 2.3 MG/DL (ref 1.6–2.3)
MCH RBC QN AUTO: 32.4 PG (ref 27–31)
MCHC RBC AUTO-ENTMCNC: 33.3 G/DL (ref 33–37)
MCV RBC AUTO: 97.3 FL (ref 81–99)
MONOCYTES # BLD AUTO: 0.91 10*3/UL (ref 0.2–1.2)
MONOCYTES NFR BLD AUTO: 10.8 % (ref 3–10)
NEUTROPHILS # BLD AUTO: 4.37 10*3/UL (ref 2–8)
NEUTROPHILS NFR BLD AUTO: 51.8 % (ref 30–85)
NRBC CBCN: 0 % (ref 0–0.7)
OSMOLALITY SERPL CALC.SUM OF ELEC: 300 MOSM/KG (ref 273–304)
PLATELET # BLD AUTO: 237 10*3/UL (ref 130–400)
PMV BLD AUTO: 9.2 FL (ref 9.4–12.3)
POTASSIUM SERPL-SCNC: 4.2 MMOL/L (ref 3.5–5.3)
RBC # BLD AUTO: 4.79 10*6/UL (ref 4.2–5.4)
SODIUM SERPL-SCNC: 144 MMOL/L (ref 135–147)
T4 FREE SERPL-MCNC: 1 NG/DL (ref 0.9–1.8)
TRIGL SERPL-MCNC: 201 MG/DL (ref 40–150)
TSH SERPL-ACNC: 2.22 M[IU]/L (ref 0.27–4.2)
VIT B12 SERPL-MCNC: 990 PG/ML (ref 211–911)
VLDLC SERPL-MCNC: 40 MG/DL (ref 5–37)
WBC # BLD AUTO: 8.43 10*3/UL (ref 4.8–10.8)

## 2020-09-24 ENCOUNTER — HOSPITAL ENCOUNTER (OUTPATIENT)
Dept: GENERAL RADIOLOGY | Facility: HOSPITAL | Age: 75
Discharge: HOME | End: 2020-09-24
Attending: INTERNAL MEDICINE

## 2021-01-13 ENCOUNTER — HOSPITAL ENCOUNTER (OUTPATIENT)
Dept: GENERAL RADIOLOGY | Facility: HOSPITAL | Age: 76
Discharge: HOME | End: 2021-01-13
Attending: INTERNAL MEDICINE

## 2021-01-13 ENCOUNTER — HOSPITAL ENCOUNTER (OUTPATIENT)
Dept: LAB | Facility: HOSPITAL | Age: 76
Discharge: HOME | End: 2021-01-13
Attending: INTERNAL MEDICINE

## 2021-01-13 LAB
25(OH)D3 SERPL-MCNC: 28.3 NG/ML (ref 30–100)
ALBUMIN SERPL-MCNC: 4.3 G/DL (ref 3.5–5)
ALBUMIN/GLOB SERPL: 1.9 {RATIO} (ref 1.4–2.6)
ALP SERPL-CCNC: 123 U/L (ref 43–160)
ALT SERPL-CCNC: 25 U/L (ref 10–40)
ANION GAP SERPL CALC-SCNC: 15 MMOL/L (ref 8–19)
AST SERPL-CCNC: 22 U/L (ref 15–50)
BASOPHILS # BLD AUTO: 0.09 10*3/UL (ref 0–0.2)
BASOPHILS NFR BLD AUTO: 1.2 % (ref 0–3)
BILIRUB SERPL-MCNC: 0.59 MG/DL (ref 0.2–1.3)
BUN SERPL-MCNC: 15 MG/DL (ref 5–25)
BUN/CREAT SERPL: 19 {RATIO} (ref 6–20)
CALCIUM SERPL-MCNC: 10 MG/DL (ref 8.7–10.4)
CHLORIDE SERPL-SCNC: 105 MMOL/L (ref 99–111)
CHOLEST SERPL-MCNC: 153 MG/DL (ref 107–200)
CHOLEST/HDLC SERPL: 3.5 {RATIO} (ref 3–6)
CONV ABS IMM GRAN: 0.03 10*3/UL (ref 0–0.2)
CONV CO2: 26 MMOL/L (ref 22–32)
CONV IMMATURE GRAN: 0.4 % (ref 0–1.8)
CONV TOTAL PROTEIN: 6.6 G/DL (ref 6.3–8.2)
CREAT UR-MCNC: 0.77 MG/DL (ref 0.5–0.9)
DEPRECATED RDW RBC AUTO: 40.9 FL (ref 36.4–46.3)
EOSINOPHIL # BLD AUTO: 0.41 10*3/UL (ref 0–0.7)
EOSINOPHIL # BLD AUTO: 5.3 % (ref 0–7)
ERYTHROCYTE [DISTWIDTH] IN BLOOD BY AUTOMATED COUNT: 11.8 % (ref 11.7–14.4)
FOLATE SERPL-MCNC: >20 NG/ML (ref 4.8–20)
GFR SERPLBLD BASED ON 1.73 SQ M-ARVRAT: >60 ML/MIN/{1.73_M2}
GLOBULIN UR ELPH-MCNC: 2.3 G/DL (ref 2–3.5)
GLUCOSE SERPL-MCNC: 90 MG/DL (ref 65–99)
HCT VFR BLD AUTO: 45.3 % (ref 37–47)
HDLC SERPL-MCNC: 44 MG/DL (ref 40–60)
HGB BLD-MCNC: 15.1 G/DL (ref 12–16)
LDLC SERPL CALC-MCNC: 78 MG/DL (ref 70–100)
LYMPHOCYTES # BLD AUTO: 1.92 10*3/UL (ref 1–5)
LYMPHOCYTES NFR BLD AUTO: 24.9 % (ref 20–45)
MCH RBC QN AUTO: 31.5 PG (ref 27–31)
MCHC RBC AUTO-ENTMCNC: 33.3 G/DL (ref 33–37)
MCV RBC AUTO: 94.4 FL (ref 81–99)
MONOCYTES # BLD AUTO: 0.79 10*3/UL (ref 0.2–1.2)
MONOCYTES NFR BLD AUTO: 10.2 % (ref 3–10)
NEUTROPHILS # BLD AUTO: 4.47 10*3/UL (ref 2–8)
NEUTROPHILS NFR BLD AUTO: 58 % (ref 30–85)
NRBC CBCN: 0 % (ref 0–0.7)
OSMOLALITY SERPL CALC.SUM OF ELEC: 294 MOSM/KG (ref 273–304)
PLATELET # BLD AUTO: 208 10*3/UL (ref 130–400)
PMV BLD AUTO: 8.8 FL (ref 9.4–12.3)
POTASSIUM SERPL-SCNC: 4.2 MMOL/L (ref 3.5–5.3)
RBC # BLD AUTO: 4.8 10*6/UL (ref 4.2–5.4)
SODIUM SERPL-SCNC: 142 MMOL/L (ref 135–147)
TRIGL SERPL-MCNC: 154 MG/DL (ref 40–150)
VIT B12 SERPL-MCNC: 1021 PG/ML (ref 211–911)
VLDLC SERPL-MCNC: 31 MG/DL (ref 5–37)
WBC # BLD AUTO: 7.71 10*3/UL (ref 4.8–10.8)

## 2021-03-09 DIAGNOSIS — Z23 IMMUNIZATION DUE: ICD-10-CM

## 2021-05-16 VITALS — WEIGHT: 170 LBS | RESPIRATION RATE: 14 BRPM | HEIGHT: 66 IN | BODY MASS INDEX: 27.32 KG/M2

## 2021-05-16 VITALS — WEIGHT: 169 LBS | RESPIRATION RATE: 16 BRPM | HEIGHT: 66 IN | BODY MASS INDEX: 27.16 KG/M2

## 2021-05-24 ENCOUNTER — HOSPITAL ENCOUNTER (OUTPATIENT)
Dept: LAB | Facility: HOSPITAL | Age: 76
Discharge: HOME | End: 2021-05-24
Attending: INTERNAL MEDICINE

## 2021-05-24 LAB
25(OH)D3 SERPL-MCNC: 26.6 NG/ML (ref 30–100)
ALBUMIN SERPL-MCNC: 4.5 G/DL (ref 3.5–5)
ALBUMIN/GLOB SERPL: 1.8 {RATIO} (ref 1.4–2.6)
ALP SERPL-CCNC: 152 U/L (ref 43–160)
ALT SERPL-CCNC: 21 U/L (ref 10–40)
ANION GAP SERPL CALC-SCNC: 14 MMOL/L (ref 8–19)
AST SERPL-CCNC: 19 U/L (ref 15–50)
BASOPHILS # BLD AUTO: 0.07 10*3/UL (ref 0–0.2)
BASOPHILS NFR BLD AUTO: 0.9 % (ref 0–3)
BILIRUB SERPL-MCNC: 0.35 MG/DL (ref 0.2–1.3)
BUN SERPL-MCNC: 19 MG/DL (ref 5–25)
BUN/CREAT SERPL: 24 {RATIO} (ref 6–20)
CALCIUM SERPL-MCNC: 9.7 MG/DL (ref 8.7–10.4)
CHLORIDE SERPL-SCNC: 105 MMOL/L (ref 99–111)
CHOLEST SERPL-MCNC: 130 MG/DL (ref 107–200)
CHOLEST/HDLC SERPL: 3.1 {RATIO} (ref 3–6)
CONV ABS IMM GRAN: 0.04 10*3/UL (ref 0–0.2)
CONV CO2: 25 MMOL/L (ref 22–32)
CONV IMMATURE GRAN: 0.5 % (ref 0–1.8)
CONV TOTAL PROTEIN: 7 G/DL (ref 6.3–8.2)
CREAT UR-MCNC: 0.79 MG/DL (ref 0.5–0.9)
DEPRECATED RDW RBC AUTO: 40.9 FL (ref 36.4–46.3)
EOSINOPHIL # BLD AUTO: 0.14 10*3/UL (ref 0–0.7)
EOSINOPHIL # BLD AUTO: 1.8 % (ref 0–7)
ERYTHROCYTE [DISTWIDTH] IN BLOOD BY AUTOMATED COUNT: 11.9 % (ref 11.7–14.4)
FOLATE SERPL-MCNC: >20 NG/ML (ref 4.8–20)
GFR SERPLBLD BASED ON 1.73 SQ M-ARVRAT: >60 ML/MIN/{1.73_M2}
GLOBULIN UR ELPH-MCNC: 2.5 G/DL (ref 2–3.5)
GLUCOSE SERPL-MCNC: 98 MG/DL (ref 65–99)
HCT VFR BLD AUTO: 43.6 % (ref 37–47)
HDLC SERPL-MCNC: 42 MG/DL (ref 40–60)
HGB BLD-MCNC: 14.6 G/DL (ref 12–16)
LDLC SERPL CALC-MCNC: 68 MG/DL (ref 70–100)
LYMPHOCYTES # BLD AUTO: 2.94 10*3/UL (ref 1–5)
LYMPHOCYTES NFR BLD AUTO: 36.8 % (ref 20–45)
MCH RBC QN AUTO: 31.5 PG (ref 27–31)
MCHC RBC AUTO-ENTMCNC: 33.5 G/DL (ref 33–37)
MCV RBC AUTO: 94.2 FL (ref 81–99)
MONOCYTES # BLD AUTO: 0.73 10*3/UL (ref 0.2–1.2)
MONOCYTES NFR BLD AUTO: 9.1 % (ref 3–10)
NEUTROPHILS # BLD AUTO: 4.07 10*3/UL (ref 2–8)
NEUTROPHILS NFR BLD AUTO: 50.9 % (ref 30–85)
NRBC CBCN: 0 % (ref 0–0.7)
OSMOLALITY SERPL CALC.SUM OF ELEC: 292 MOSM/KG (ref 273–304)
PLATELET # BLD AUTO: 193 10*3/UL (ref 130–400)
PMV BLD AUTO: 8.9 FL (ref 9.4–12.3)
POTASSIUM SERPL-SCNC: 4 MMOL/L (ref 3.5–5.3)
RBC # BLD AUTO: 4.63 10*6/UL (ref 4.2–5.4)
SODIUM SERPL-SCNC: 140 MMOL/L (ref 135–147)
T4 FREE SERPL-MCNC: 1 NG/DL (ref 0.9–1.8)
TRIGL SERPL-MCNC: 102 MG/DL (ref 40–150)
TSH SERPL-ACNC: 1.1 M[IU]/L (ref 0.27–4.2)
VIT B12 SERPL-MCNC: 773 PG/ML (ref 211–911)
VLDLC SERPL-MCNC: 20 MG/DL (ref 5–37)
WBC # BLD AUTO: 7.99 10*3/UL (ref 4.8–10.8)

## 2021-11-02 PROBLEM — N83.209 OVARIAN CYST: Status: ACTIVE | Noted: 2021-11-02

## 2021-11-02 PROBLEM — B39.9 HISTOPLASMOSIS: Status: ACTIVE | Noted: 2021-11-02

## 2021-11-02 PROBLEM — J98.01 BRONCHOSPASM: Status: ACTIVE | Noted: 2021-11-02

## 2021-11-02 PROBLEM — E78.5 HYPERLIPIDEMIA: Status: ACTIVE | Noted: 2021-11-02

## 2021-11-02 PROBLEM — K57.92 DIVERTICULITIS: Status: ACTIVE | Noted: 2021-11-02

## 2021-11-02 PROBLEM — L71.9 ACNE ROSACEA: Status: ACTIVE | Noted: 2021-11-02

## 2021-11-02 PROBLEM — J45.909 ASTHMA: Status: ACTIVE | Noted: 2021-11-02

## 2021-11-03 ENCOUNTER — OFFICE VISIT (OUTPATIENT)
Dept: INTERNAL MEDICINE | Facility: CLINIC | Age: 76
End: 2021-11-03

## 2021-11-03 VITALS
SYSTOLIC BLOOD PRESSURE: 122 MMHG | DIASTOLIC BLOOD PRESSURE: 68 MMHG | HEIGHT: 66 IN | HEART RATE: 72 BPM | WEIGHT: 173.4 LBS | RESPIRATION RATE: 16 BRPM | TEMPERATURE: 96.9 F | BODY MASS INDEX: 27.87 KG/M2

## 2021-11-03 DIAGNOSIS — E78.5 HYPERLIPIDEMIA, UNSPECIFIED HYPERLIPIDEMIA TYPE: ICD-10-CM

## 2021-11-03 DIAGNOSIS — J45.20 MILD INTERMITTENT ASTHMA WITHOUT COMPLICATION: ICD-10-CM

## 2021-11-03 DIAGNOSIS — E03.9 HYPOTHYROIDISM, UNSPECIFIED TYPE: ICD-10-CM

## 2021-11-03 DIAGNOSIS — K57.92 DIVERTICULITIS: ICD-10-CM

## 2021-11-03 DIAGNOSIS — Z79.899 ENCOUNTER FOR LONG-TERM (CURRENT) USE OF OTHER MEDICATIONS: ICD-10-CM

## 2021-11-03 DIAGNOSIS — E83.42 HYPOMAGNESEMIA: ICD-10-CM

## 2021-11-03 DIAGNOSIS — E55.9 VITAMIN D DEFICIENCY: ICD-10-CM

## 2021-11-03 DIAGNOSIS — Z13.9 SCREENING DUE: Primary | ICD-10-CM

## 2021-11-03 DIAGNOSIS — I10 PRIMARY HYPERTENSION: ICD-10-CM

## 2021-11-03 DIAGNOSIS — E53.8 B12 DEFICIENCY: ICD-10-CM

## 2021-11-03 PROCEDURE — 99214 OFFICE O/P EST MOD 30 MIN: CPT | Performed by: INTERNAL MEDICINE

## 2021-11-03 RX ORDER — SIMVASTATIN 40 MG
40 TABLET ORAL DAILY
COMMUNITY
Start: 2021-08-10 | End: 2022-05-09

## 2021-11-03 RX ORDER — MONTELUKAST SODIUM 10 MG/1
10 TABLET ORAL DAILY
COMMUNITY
Start: 2021-08-10 | End: 2022-05-09

## 2021-11-03 RX ORDER — ERGOCALCIFEROL 1.25 MG/1
50000 CAPSULE ORAL WEEKLY
COMMUNITY
Start: 2021-10-31 | End: 2022-03-21

## 2021-11-03 NOTE — ASSESSMENT & PLAN NOTE
And had right flank and right lower abdominal pain and went to the ED over the weekend.  This was in [place].  I reviewed all of her labs and x-rays I did there.  They were all normal.  CT scan showed no kidney stones or acute diverticulitis.  CBC UA and CMP was completely normal.  Assessment: Right-sided abdominal pain seen in ED this past weekend.  Because of a history of recurrent diverticulitis, they instituted Flagyl and Cipro.  She had some sort of oral reaction to that including ulcers in her mouth and some swelling of the upper lip.  She stopped the medication.  Her pain is resolved now.  Plan: We will need to be careful about ordering Flagyl and Cipro next time.  Be careful with her diet.  She is already had surgery for recurrent diverticulitis.

## 2021-11-03 NOTE — ASSESSMENT & PLAN NOTE
Lipids are doing well.  Tolerating statins without difficulty.  Assessment: Hyperlipidemia controlled.  Plan: Continue Zocor 40 mg at at bedtime

## 2021-11-03 NOTE — PROGRESS NOTES
"Chief Complaint  Medication Reaction (Pt was prescribed metronidazole in the ER saturday and started having a reaction on monday. Pt had blisters on tongue and lips were swollen) and Diverticulitis (Pt was in ER saturday. Pt felt like she might be starting to have a flare up. Pt was prescribed medication. )    Subjective      Maryam Sandoval presents to [facility]  History of Present Illness  Patient is doing well with allergies.  Recently in the ED.  No particular abnormalities found but had a reaction to Cipro and Flagyl.  She stopped both meds.  Had oral ulcers and some swelling of the upper lip.  History of recurrent diverticulitis.  She is asymptomatic today.  Mild asthma doing well.  Hyperlipidemia doing well and tolerating medication.  Objective   Vital Signs:   /68 (BP Location: Left arm, Patient Position: Sitting, Cuff Size: Adult)   Pulse 72   Temp 96.9 °F (36.1 °C) (Temporal)   Resp 16   Ht 167.6 cm (66\")   Wt 78.7 kg (173 lb 6.4 oz)   BMI 27.99 kg/m²     Physical Exam  Vitals and nursing note reviewed.   Constitutional:       Appearance: Normal appearance.   HENT:      Head: Normocephalic.   Eyes:      Extraocular Movements: Extraocular movements intact.      Conjunctiva/sclera: Conjunctivae normal.   Cardiovascular:      Rate and Rhythm: Normal rate and regular rhythm.      Heart sounds: Normal heart sounds. No murmur heard.      Pulmonary:      Effort: Pulmonary effort is normal.      Breath sounds: Normal breath sounds. No wheezing or rales.   Abdominal:      General: Bowel sounds are normal.      Palpations: Abdomen is soft.      Tenderness: There is no abdominal tenderness. There is no guarding.   Musculoskeletal:         General: No swelling. Normal range of motion.   Skin:     General: Skin is warm and dry.   Neurological:      General: No focal deficit present.      Mental Status: She is alert. Mental status is at baseline.   Psychiatric:   "       Mood and Affect: Mood normal.         Thought Content: Thought content normal.        Result Review :  The following data was reviewed by: Yessi Aguilar MD on 11/03/2021:  CMP    CMP 1/13/21 5/24/21   Glucose 90 98   BUN 15 19   Creatinine 0.77 0.79   Sodium 142 140   Potassium 4.2 4.0   Chloride 105 105   Calcium 10.0 9.7   Albumin 4.3 4.5   Total Bilirubin 0.59 0.35   Alkaline Phosphatase 123 152   AST (SGOT) 22 19   ALT (SGPT) 25 21           CBC w/diff    CBC w/Diff 1/13/21 5/24/21   WBC 7.71 7.99   RBC 4.80 4.63   Hemoglobin 15.1 14.6   Hematocrit 45.3 43.6   MCV 94.4 94.2   MCH 31.5 (A) 31.5 (A)   MCHC 33.3 33.5   RDW 11.8 11.9   Platelets 208 193   Neutrophil Rel % 58.0 50.9   Lymphocyte Rel % 24.9 36.8   Monocyte Rel % 10.2 (A) 9.1   Eosinophil Rel % 5.3 1.8   Basophil Rel % 1.2 0.9   (A) Abnormal value            Lipid Panel    Lipid Panel 1/13/21 5/24/21   Total Cholesterol 153 130   Triglycerides 154 (A) 102   HDL Cholesterol 44 42   VLDL Cholesterol 31 20   LDL Cholesterol  78 68 (A)   (A) Abnormal value       Comments are available for some flowsheets but are not being displayed.           TSH    TSH 5/24/21   TSH 1.100                        She had recent labs done last weekend  In the ED and [place].  So she did not repeat any labs this time.  I did review all of her labs and they were okay.  A lipid panel was not done but she usually is well controlled.  Assessment and Plan   Diagnoses and all orders for this visit:    1. Screening due (Primary)    2. B12 deficiency    3. Encounter for long-term (current) use of other medications    4. Primary hypertension    5. Hyperlipidemia, unspecified hyperlipidemia type  Assessment & Plan:  Lipids are doing well.  Tolerating statins without difficulty.  Assessment: Hyperlipidemia controlled.  Plan: Continue Zocor 40 mg at at bedtime      6. Hypomagnesemia    7. Hypothyroidism, unspecified type    8. Vitamin D deficiency    9.  Diverticulitis  Assessment & Plan:  And had right flank and right lower abdominal pain and went to the ED over the weekend.  This was in [place].  I reviewed all of her labs and x-rays I did there.  They were all normal.  CT scan showed no kidney stones or acute diverticulitis.  CBC UA and CMP was completely normal.  Assessment: Right-sided abdominal pain seen in ED this past weekend.  Because of a history of recurrent diverticulitis, they instituted Flagyl and Cipro.  She had some sort of oral reaction to that including ulcers in her mouth and some swelling of the upper lip.  She stopped the medication.  Her pain is resolved now.  Plan: We will need to be careful about ordering Flagyl and Cipro next time.  Be careful with her diet.  She is already had surgery for recurrent diverticulitis.      10. Mild intermittent asthma without complication  Assessment & Plan:  Patient with history of asthma.  Is actually been doing well this fall.  Assessment: Asthma stable.  Plan: Continue Singulair 10 mg daily.  She has a rescue inhaler but needs a refill.  That was sent for.            Follow Up Return in about 4 months (around 3/3/2022).  Patient was given instructions and counseling regarding her condition or for health maintenance advice. Please see specific information pulled into the AVS if appropriate.

## 2021-11-03 NOTE — ASSESSMENT & PLAN NOTE
Patient with history of asthma.  Is actually been doing well this fall.  Assessment: Asthma stable.  Plan: Continue Singulair 10 mg daily.  She has a rescue inhaler but needs a refill.  That was sent for.

## 2021-11-15 ENCOUNTER — TELEPHONE (OUTPATIENT)
Dept: INTERNAL MEDICINE | Facility: CLINIC | Age: 76
End: 2021-11-15

## 2021-11-15 DIAGNOSIS — Z12.31 SCREENING MAMMOGRAM FOR BREAST CANCER: Primary | ICD-10-CM

## 2022-02-11 ENCOUNTER — HOSPITAL ENCOUNTER (OUTPATIENT)
Dept: MAMMOGRAPHY | Facility: HOSPITAL | Age: 77
Discharge: HOME | End: 2022-02-11
Admitting: INTERNAL MEDICINE

## 2022-02-11 DIAGNOSIS — Z12.31 SCREENING MAMMOGRAM FOR BREAST CANCER: ICD-10-CM

## 2022-02-11 PROCEDURE — 77067 SCR MAMMO BI INCL CAD: CPT

## 2022-02-11 PROCEDURE — 77063 BREAST TOMOSYNTHESIS BI: CPT

## 2022-03-01 PROBLEM — K63.9 COLON ABNORMALITY: Status: ACTIVE | Noted: 2022-03-01

## 2022-03-01 PROBLEM — J30.2 SEASONAL ALLERGIC RHINITIS: Status: ACTIVE | Noted: 2022-03-01

## 2022-03-03 ENCOUNTER — LAB (OUTPATIENT)
Dept: LAB | Facility: HOSPITAL | Age: 77
End: 2022-03-03

## 2022-03-03 ENCOUNTER — OFFICE VISIT (OUTPATIENT)
Dept: INTERNAL MEDICINE | Facility: CLINIC | Age: 77
End: 2022-03-03

## 2022-03-03 VITALS
SYSTOLIC BLOOD PRESSURE: 112 MMHG | TEMPERATURE: 97.7 F | HEART RATE: 58 BPM | RESPIRATION RATE: 16 BRPM | BODY MASS INDEX: 28.83 KG/M2 | HEIGHT: 66 IN | OXYGEN SATURATION: 97 % | DIASTOLIC BLOOD PRESSURE: 60 MMHG | WEIGHT: 179.4 LBS

## 2022-03-03 DIAGNOSIS — E78.2 MIXED HYPERLIPIDEMIA: ICD-10-CM

## 2022-03-03 DIAGNOSIS — E55.9 VITAMIN D DEFICIENCY: ICD-10-CM

## 2022-03-03 DIAGNOSIS — M85.852 OSTEOPENIA OF BOTH HIPS: Primary | ICD-10-CM

## 2022-03-03 DIAGNOSIS — E78.5 HYPERLIPIDEMIA, UNSPECIFIED HYPERLIPIDEMIA TYPE: ICD-10-CM

## 2022-03-03 DIAGNOSIS — M25.512 CHRONIC PAIN OF BOTH SHOULDERS: ICD-10-CM

## 2022-03-03 DIAGNOSIS — E53.8 B12 DEFICIENCY: ICD-10-CM

## 2022-03-03 DIAGNOSIS — K57.92 DIVERTICULITIS: ICD-10-CM

## 2022-03-03 DIAGNOSIS — E83.42 HYPOMAGNESEMIA: ICD-10-CM

## 2022-03-03 DIAGNOSIS — Z13.9 SCREENING DUE: ICD-10-CM

## 2022-03-03 DIAGNOSIS — M25.511 CHRONIC PAIN OF BOTH SHOULDERS: ICD-10-CM

## 2022-03-03 DIAGNOSIS — J45.20 MILD INTERMITTENT ASTHMA WITHOUT COMPLICATION: ICD-10-CM

## 2022-03-03 DIAGNOSIS — M85.851 OSTEOPENIA OF BOTH HIPS: Primary | ICD-10-CM

## 2022-03-03 DIAGNOSIS — E03.9 HYPOTHYROIDISM, UNSPECIFIED TYPE: ICD-10-CM

## 2022-03-03 DIAGNOSIS — Z13.0 SCREENING FOR IRON DEFICIENCY ANEMIA: ICD-10-CM

## 2022-03-03 DIAGNOSIS — Z79.899 ENCOUNTER FOR LONG-TERM (CURRENT) USE OF OTHER MEDICATIONS: ICD-10-CM

## 2022-03-03 DIAGNOSIS — I10 PRIMARY HYPERTENSION: ICD-10-CM

## 2022-03-03 DIAGNOSIS — G89.29 CHRONIC PAIN OF BOTH SHOULDERS: ICD-10-CM

## 2022-03-03 PROBLEM — M85.859 OSTEOPENIA OF HIP: Status: ACTIVE | Noted: 2022-03-03

## 2022-03-03 LAB
25(OH)D3 SERPL-MCNC: 31.1 NG/ML (ref 30–100)
ALBUMIN SERPL-MCNC: 4.6 G/DL (ref 3.5–5.2)
ALBUMIN/GLOB SERPL: 2.1 G/DL
ALP SERPL-CCNC: 141 U/L (ref 39–117)
ALT SERPL W P-5'-P-CCNC: 21 U/L (ref 1–33)
ANION GAP SERPL CALCULATED.3IONS-SCNC: 12.1 MMOL/L (ref 5–15)
AST SERPL-CCNC: 20 U/L (ref 1–32)
BASOPHILS # BLD AUTO: 0.08 10*3/MM3 (ref 0–0.2)
BASOPHILS NFR BLD AUTO: 1 % (ref 0–1.5)
BILIRUB SERPL-MCNC: 0.5 MG/DL (ref 0–1.2)
BUN SERPL-MCNC: 16 MG/DL (ref 8–23)
BUN/CREAT SERPL: 18.8 (ref 7–25)
CALCIUM SPEC-SCNC: 10.2 MG/DL (ref 8.6–10.5)
CHLORIDE SERPL-SCNC: 105 MMOL/L (ref 98–107)
CHOLEST SERPL-MCNC: 125 MG/DL (ref 0–200)
CO2 SERPL-SCNC: 24.9 MMOL/L (ref 22–29)
CREAT SERPL-MCNC: 0.85 MG/DL (ref 0.57–1)
DEPRECATED RDW RBC AUTO: 43.3 FL (ref 37–54)
EGFRCR SERPLBLD CKD-EPI 2021: 71.5 ML/MIN/1.73
EOSINOPHIL # BLD AUTO: 0.1 10*3/MM3 (ref 0–0.4)
EOSINOPHIL NFR BLD AUTO: 1.3 % (ref 0.3–6.2)
ERYTHROCYTE [DISTWIDTH] IN BLOOD BY AUTOMATED COUNT: 12.2 % (ref 12.3–15.4)
FOLATE SERPL-MCNC: >20 NG/ML (ref 4.78–24.2)
GLOBULIN UR ELPH-MCNC: 2.2 GM/DL
GLUCOSE SERPL-MCNC: 95 MG/DL (ref 65–99)
HCT VFR BLD AUTO: 43.2 % (ref 34–46.6)
HCV AB SER DONR QL: NORMAL
HDLC SERPL-MCNC: 37 MG/DL (ref 40–60)
HGB BLD-MCNC: 14.5 G/DL (ref 12–15.9)
IMM GRANULOCYTES # BLD AUTO: 0.02 10*3/MM3 (ref 0–0.05)
IMM GRANULOCYTES NFR BLD AUTO: 0.3 % (ref 0–0.5)
LDLC SERPL CALC-MCNC: 57 MG/DL (ref 0–100)
LDLC/HDLC SERPL: 1.37 {RATIO}
LYMPHOCYTES # BLD AUTO: 1.94 10*3/MM3 (ref 0.7–3.1)
LYMPHOCYTES NFR BLD AUTO: 25.5 % (ref 19.6–45.3)
MAGNESIUM SERPL-MCNC: 2.3 MG/DL (ref 1.6–2.4)
MCH RBC QN AUTO: 32.2 PG (ref 26.6–33)
MCHC RBC AUTO-ENTMCNC: 33.6 G/DL (ref 31.5–35.7)
MCV RBC AUTO: 96 FL (ref 79–97)
MONOCYTES # BLD AUTO: 0.77 10*3/MM3 (ref 0.1–0.9)
MONOCYTES NFR BLD AUTO: 10.1 % (ref 5–12)
NEUTROPHILS NFR BLD AUTO: 4.71 10*3/MM3 (ref 1.7–7)
NEUTROPHILS NFR BLD AUTO: 61.8 % (ref 42.7–76)
NRBC BLD AUTO-RTO: 0.1 /100 WBC (ref 0–0.2)
PLATELET # BLD AUTO: 197 10*3/MM3 (ref 140–450)
PMV BLD AUTO: 9.2 FL (ref 6–12)
POTASSIUM SERPL-SCNC: 4.1 MMOL/L (ref 3.5–5.2)
PROT SERPL-MCNC: 6.8 G/DL (ref 6–8.5)
RBC # BLD AUTO: 4.5 10*6/MM3 (ref 3.77–5.28)
SODIUM SERPL-SCNC: 142 MMOL/L (ref 136–145)
T4 FREE SERPL-MCNC: 1.08 NG/DL (ref 0.93–1.7)
TRIGL SERPL-MCNC: 187 MG/DL (ref 0–150)
TSH SERPL DL<=0.05 MIU/L-ACNC: 1.37 UIU/ML (ref 0.27–4.2)
VIT B12 BLD-MCNC: 698 PG/ML (ref 211–946)
VLDLC SERPL-MCNC: 31 MG/DL (ref 5–40)
WBC NRBC COR # BLD: 7.62 10*3/MM3 (ref 3.4–10.8)

## 2022-03-03 PROCEDURE — 80061 LIPID PANEL: CPT

## 2022-03-03 PROCEDURE — 84443 ASSAY THYROID STIM HORMONE: CPT

## 2022-03-03 PROCEDURE — 86803 HEPATITIS C AB TEST: CPT

## 2022-03-03 PROCEDURE — 82306 VITAMIN D 25 HYDROXY: CPT

## 2022-03-03 PROCEDURE — 82607 VITAMIN B-12: CPT

## 2022-03-03 PROCEDURE — 80053 COMPREHEN METABOLIC PANEL: CPT

## 2022-03-03 PROCEDURE — 85025 COMPLETE CBC W/AUTO DIFF WBC: CPT

## 2022-03-03 PROCEDURE — 36415 COLL VENOUS BLD VENIPUNCTURE: CPT

## 2022-03-03 PROCEDURE — 99214 OFFICE O/P EST MOD 30 MIN: CPT | Performed by: INTERNAL MEDICINE

## 2022-03-03 PROCEDURE — 83735 ASSAY OF MAGNESIUM: CPT

## 2022-03-03 PROCEDURE — 84439 ASSAY OF FREE THYROXINE: CPT

## 2022-03-03 PROCEDURE — 82746 ASSAY OF FOLIC ACID SERUM: CPT

## 2022-03-03 NOTE — ASSESSMENT & PLAN NOTE
Last bone density was January 2020.  Mild osteopenia.  Assessment: Osteopenia.  Plan: Schedule DEXA.  Continue vitamin D 50,000 units weekly.  Make sure her diet is adequate and calcium.

## 2022-03-03 NOTE — PROGRESS NOTES
"Chief Complaint  Routine office visit (Pt just had labs done this morning. ) and Shoulder Pain (Pt has been having bilateral shoulder pain but the left shoulder has worsened. This has been going on several months and pt wanted to know about PT to see if that would help. )    Subjective  Patient has had some decreased range of motion in both shoulders with some mild pain but particularly on the left.  She would like to get some physical therapy will get that in [place] closer to where she lives.  Prescription was given to her for that.  Overall she has been doing well.  Going to [facility] this summer with her brother.    Maryam Sandoval presents to [facility]  History of Present Illness  76-year-old lady with hyperlipidemia.  Mild asthma seasonal allergies.  Osteopenia.  History of cholecystectomy after presenting with an episode of acute pancreatitis.  Recurrent diverticulitis and partial sigmoid colon resection.  Mild short bowel syndrome due to that.  Osteopenia.  Ventral hernia repair in 2012.  History of caffeine withdrawal migraine in the hospital one time.  Family history of colon cancer in an aunt.  Moderate tricuspid regurg and mild mitral regurg on last echocardiogram in August 2019 with a EF of 60%.  Right TKR in 2014.  Objective   Vital Signs:   /60 (BP Location: Left arm, Patient Position: Sitting, Cuff Size: Adult)   Pulse 58   Temp 97.7 °F (36.5 °C) (Temporal)   Resp 16   Ht 167.6 cm (66\")   Wt 81.4 kg (179 lb 6.4 oz)   SpO2 97%   BMI 28.96 kg/m²     Physical Exam  Vitals and nursing note reviewed.   Constitutional:       Appearance: Normal appearance.   HENT:      Head: Normocephalic.   Eyes:      Extraocular Movements: Extraocular movements intact.      Conjunctiva/sclera: Conjunctivae normal.   Cardiovascular:      Rate and Rhythm: Normal rate and regular rhythm.      Heart sounds: Normal heart sounds. No murmur heard.      Pulmonary:      " Effort: Pulmonary effort is normal.      Breath sounds: Normal breath sounds. No wheezing or rales.   Abdominal:      General: Bowel sounds are normal.      Palpations: Abdomen is soft.      Tenderness: There is no abdominal tenderness. There is no guarding.   Musculoskeletal:         General: No swelling. Normal range of motion.   Skin:     General: Skin is warm and dry.   Neurological:      General: No focal deficit present.      Mental Status: She is alert. Mental status is at baseline.   Psychiatric:         Mood and Affect: Mood normal.         Thought Content: Thought content normal.        Result Review :  The following data was reviewed by: Yessi Aguilar MD on 03/03/2022:             Labs were drawn today and are pending.  Mammogram in February 2022 was normal.  Last bone density in January 2020 showed osteopenia.  Assessment and Plan   Diagnoses and all orders for this visit:    1. Osteopenia of both hips (Primary)  Assessment & Plan:  Last bone density was January 2020.  Mild osteopenia.  Assessment: Osteopenia.  Plan: Schedule DEXA.  Continue vitamin D 50,000 units weekly.  Make sure her diet is adequate and calcium.    Orders:  -     DEXA Bone Density Axial; Future  -     Comprehensive Metabolic Panel; Future  -     Vitamin D 25 Hydroxy; Future    2. Screening for iron deficiency anemia  -     CBC & Differential; Future  -     Iron Profile; Future    3. Mixed hyperlipidemia  Assessment & Plan:  Patient had lab drawn today and lipid panel is pending.  Tolerating Zocor 40 mg at bedtime.  Assessment: Hyperlipidemia on statin.  Plan: Continue simvastatin 40 mg at bedtime.  Check lipid panel later today    Orders:  -     Lipid Panel; Future    4. B12 deficiency  -     Vitamin B12 & Folate; Future    5. Mild intermittent asthma without complication  Assessment & Plan:  History of mild asthma.  Has been doing well.  Remains on Singulair daily.  Assessment: Well-controlled asthma.  Plan: Continue Singulair 10  mg daily.        6. Chronic pain of both shoulders  Assessment & Plan:  Patient notes mild discomfort primarily in the left shoulder when she reaches backward is slightly over her head.  She has pretty normal range of motion of both shoulders right greater than left.  Assessment: Mild decreased range of motion of the shoulders.  Plan: She should benefit nicely from physical therapy for period of time.  She is given a prescription for physical therapy and she can take it to the physical therapy department closer to her in [place].      7. Diverticulitis  Assessment & Plan:  Patient has a history of colonic diverticulosis and recurrent diverticulitis ultimately resulting in partial sigmoid colectomy and in April 2010 by Dr. Cervantes.  She also had a ventral hernia repair in January 2012 by Dr. Cervantes.  She has somewhat of a short bowel syndrome with intermittent episodes of diarrhea.  Lactose intolerant.  She has had no recurrent episodes of diverticulitis recently.  The last time she was treated for diverticulitis she was given both Cipro and Flagyl.  She developed some oral lesions from that.  We will need to be careful in prescribing those 2 drugs if she has any further episodes of diverticulitis.  Assessment: Recurrent diverticulitis status post partial sigmoid colectomy April 2010.  Plan: Continue to observe.          Follow Up Return in about 3 months (around 6/3/2022).  Patient was given instructions and counseling regarding her condition or for health maintenance advice. Please see specific information pulled into the AVS if appropriate.

## 2022-03-03 NOTE — ASSESSMENT & PLAN NOTE
Patient had lab drawn today and lipid panel is pending.  Tolerating Zocor 40 mg at bedtime.  Assessment: Hyperlipidemia on statin.  Plan: Continue simvastatin 40 mg at bedtime.  Check lipid panel later today

## 2022-03-03 NOTE — ASSESSMENT & PLAN NOTE
History of mild asthma.  Has been doing well.  Remains on Singulair daily.  Assessment: Well-controlled asthma.  Plan: Continue Singulair 10 mg daily.

## 2022-03-03 NOTE — ASSESSMENT & PLAN NOTE
Patient has a history of colonic diverticulosis and recurrent diverticulitis ultimately resulting in partial sigmoid colectomy and in April 2010 by Dr. Cervantes.  She also had a ventral hernia repair in January 2012 by Dr. Cervantes.  She has somewhat of a short bowel syndrome with intermittent episodes of diarrhea.  Lactose intolerant.  She has had no recurrent episodes of diverticulitis recently.  The last time she was treated for diverticulitis she was given both Cipro and Flagyl.  She developed some oral lesions from that.  We will need to be careful in prescribing those 2 drugs if she has any further episodes of diverticulitis.  Assessment: Recurrent diverticulitis status post partial sigmoid colectomy April 2010.  Plan: Continue to observe.

## 2022-03-03 NOTE — ASSESSMENT & PLAN NOTE
Patient notes mild discomfort primarily in the left shoulder when she reaches backward is slightly over her head.  She has pretty normal range of motion of both shoulders right greater than left.  Assessment: Mild decreased range of motion of the shoulders.  Plan: She should benefit nicely from physical therapy for period of time.  She is given a prescription for physical therapy and she can take it to the physical therapy department closer to her in [place].

## 2022-03-21 RX ORDER — ERGOCALCIFEROL 1.25 MG/1
CAPSULE ORAL
Qty: 12 CAPSULE | Refills: 3 | Status: SHIPPED | OUTPATIENT
Start: 2022-03-21 | End: 2023-02-22 | Stop reason: SDUPTHER

## 2022-03-31 ENCOUNTER — TELEPHONE (OUTPATIENT)
Dept: INTERNAL MEDICINE | Facility: CLINIC | Age: 77
End: 2022-03-31

## 2022-03-31 DIAGNOSIS — M25.569 KNEE PAIN, UNSPECIFIED CHRONICITY, UNSPECIFIED LATERALITY: Primary | ICD-10-CM

## 2022-03-31 NOTE — TELEPHONE ENCOUNTER
Caller: Maryam Sandoval    Relationship to patient: Self    Best call back number: [phone]    Patient is needing: PATIENT CALLED IN AND SAID THAT SHE NEEDS AN ORDER PLACED FOR PT ON HER KNEES SENT TO [facility] IN [place] . PATIENT GAVE THEIR PHONE NUMBER [phone]. PATIENT STATES THAT SHE HAS AN APPOINTMENT TOMORROW AND WOULD LIKE ORDER SENT TODAY IF POSSIBLE

## 2022-05-09 RX ORDER — SIMVASTATIN 40 MG
TABLET ORAL
Qty: 90 TABLET | Refills: 3 | Status: SHIPPED | OUTPATIENT
Start: 2022-05-09

## 2022-05-09 RX ORDER — MONTELUKAST SODIUM 10 MG/1
TABLET ORAL
Qty: 90 TABLET | Refills: 3 | Status: SHIPPED | OUTPATIENT
Start: 2022-05-09

## 2022-06-22 ENCOUNTER — OFFICE VISIT (OUTPATIENT)
Dept: INTERNAL MEDICINE | Facility: CLINIC | Age: 77
End: 2022-06-22

## 2022-06-22 VITALS
HEART RATE: 66 BPM | BODY MASS INDEX: 26.68 KG/M2 | SYSTOLIC BLOOD PRESSURE: 124 MMHG | WEIGHT: 166 LBS | DIASTOLIC BLOOD PRESSURE: 77 MMHG | TEMPERATURE: 97.6 F | HEIGHT: 66 IN

## 2022-06-22 DIAGNOSIS — E61.1 IRON DEFICIENCY: ICD-10-CM

## 2022-06-22 DIAGNOSIS — M79.10 MYALGIA DUE TO STATIN: ICD-10-CM

## 2022-06-22 DIAGNOSIS — T46.6X5A MYALGIA DUE TO STATIN: ICD-10-CM

## 2022-06-22 DIAGNOSIS — R53.83 FATIGUE, UNSPECIFIED TYPE: Primary | ICD-10-CM

## 2022-06-22 DIAGNOSIS — E78.2 MIXED HYPERLIPIDEMIA: ICD-10-CM

## 2022-06-22 DIAGNOSIS — J45.20 MILD INTERMITTENT ASTHMA WITHOUT COMPLICATION: ICD-10-CM

## 2022-06-22 PROCEDURE — 99214 OFFICE O/P EST MOD 30 MIN: CPT | Performed by: INTERNAL MEDICINE

## 2022-06-22 RX ORDER — GABAPENTIN 100 MG/1
CAPSULE ORAL
COMMUNITY
Start: 2022-06-03 | End: 2022-09-26

## 2022-06-22 NOTE — ASSESSMENT & PLAN NOTE
Lipids have been well controlled with Zocor 40 mg at bedtime.  Complains of some cramping in her legs and feet after she has been walking.  This could be related to the simvastatin and I discussed that with her.  Try stopping the Zocor entirely for 2 weeks and see if that makes any difference.  If it does just reduce the dose to 20 mg at bedtime.

## 2022-06-22 NOTE — ASSESSMENT & PLAN NOTE
Patient is having cramping in her legs and feet.  She is on 40 mg of simvastatin.  Assessment: This may be related to her statin drug.  Plan: Stop the simvastatin and reevaluate in 2 weeks.  If the muscle cramps resolve, then try reducing starting simvastatin 20 mg at bedtime.  If she notices no change after she stops the simvastatin, then just resume simvastatin 40 mg at bedtime.  All that was discussed with the patient.

## 2022-06-22 NOTE — PROGRESS NOTES
"Chief Complaint  Hyperlipidemia (Pt here for follow up. Pt complains about cramping in legs and feet.)    Subjective  Patient just got back from [place] where she walked for 35 miles with her brother and sister-in-law.  Stated that her brother-in-law was almost blind.  She enjoyed the walk a great deal.  She did better than she thought she might.    Maryam Sandoval presents to [facility]  History of Present Illness  76-year-old very pleasant lady with history of diverticulosis and diverticulitis.  Had partial colon resection due to recurrent severe diverticulitis.  Has done well since then.  History of mild asthma with allergies doing well at this time.  Hyperlipidemia.  Rule out statin myalgias.  Status postcholecystectomy after episode pancreatitis and acute cholecystitis.  Objective   Vital Signs:   /77   Pulse 66   Temp 97.6 °F (36.4 °C)   Ht 167.6 cm (65.98\")   Wt 75.3 kg (166 lb)   BMI 26.81 kg/m²     Physical Exam  Vitals and nursing note reviewed.   Constitutional:       Appearance: Normal appearance.   HENT:      Head: Normocephalic.   Eyes:      Extraocular Movements: Extraocular movements intact.      Conjunctiva/sclera: Conjunctivae normal.   Cardiovascular:      Rate and Rhythm: Normal rate and regular rhythm.      Heart sounds: Normal heart sounds. No murmur heard.  Pulmonary:      Effort: Pulmonary effort is normal.      Breath sounds: Normal breath sounds. No wheezing or rales.   Abdominal:      General: Bowel sounds are normal.      Palpations: Abdomen is soft.      Tenderness: There is no abdominal tenderness. There is no guarding.   Musculoskeletal:         General: No swelling. Normal range of motion.   Skin:     General: Skin is warm and dry.   Neurological:      General: No focal deficit present.      Mental Status: She is alert. Mental status is at baseline.   Psychiatric:         Mood and Affect: Mood normal.         Thought Content: " Thought content normal.        Result Review :  The following data was reviewed by: Yessi Aguilar MD on 06/22/2022:  CMP    CMP 3/3/22   Glucose 95   BUN 16   Creatinine 0.85   Sodium 142   Potassium 4.1   Chloride 105   Calcium 10.2   Albumin 4.60   Total Bilirubin 0.5   Alkaline Phosphatase 141 (A)   AST (SGOT) 20   ALT (SGPT) 21   (A) Abnormal value            CBC w/diff    CBC w/Diff 3/3/22   WBC 7.62   RBC 4.50   Hemoglobin 14.5   Hematocrit 43.2   MCV 96.0   MCH 32.2   MCHC 33.6   RDW 12.2 (A)   Platelets 197   Neutrophil Rel % 61.8   Immature Granulocyte Rel % 0.3   Lymphocyte Rel % 25.5   Monocyte Rel % 10.1   Eosinophil Rel % 1.3   Basophil Rel % 1.0   (A) Abnormal value            Lipid Panel    Lipid Panel 3/3/22   Total Cholesterol 125   Triglycerides 187 (A)   HDL Cholesterol 37 (A)   VLDL Cholesterol 31   LDL Cholesterol  57   LDL/HDL Ratio 1.37   (A) Abnormal value            TSH    TSH 3/3/22   TSH 1.370                             Assessment and Plan   Diagnoses and all orders for this visit:    1. Fatigue, unspecified type (Primary)  -     TSH+Free T4; Future    2. Iron deficiency  -     Ferritin; Future    3. Myalgia due to statin  Assessment & Plan:  Patient is having cramping in her legs and feet.  She is on 40 mg of simvastatin.  Assessment: This may be related to her statin drug.  Plan: Stop the simvastatin and reevaluate in 2 weeks.  If the muscle cramps resolve, then try reducing starting simvastatin 20 mg at bedtime.  If she notices no change after she stops the simvastatin, then just resume simvastatin 40 mg at bedtime.  All that was discussed with the patient.      4. Mixed hyperlipidemia  Assessment & Plan:  Lipids have been well controlled with Zocor 40 mg at bedtime.  Complains of some cramping in her legs and feet after she has been walking.  This could be related to the simvastatin and I discussed that with her.  Try stopping the Zocor entirely for 2 weeks and see if that makes  any difference.  If it does just reduce the dose to 20 mg at bedtime.      5. Mild intermittent asthma without complication  Assessment & Plan:  Asthma and allergies have been doing well.  Plan: Continue on Singulair 10 mg daily.              Follow Up No follow-ups on file.  Patient was given instructions and counseling regarding her condition or for health maintenance advice. Please see specific information pulled into the AVS if appropriate.

## 2022-07-06 ENCOUNTER — APPOINTMENT (OUTPATIENT)
Dept: BONE DENSITY | Facility: HOSPITAL | Age: 77
End: 2022-07-06

## 2022-07-13 ENCOUNTER — TELEPHONE (OUTPATIENT)
Dept: INTERNAL MEDICINE | Facility: CLINIC | Age: 77
End: 2022-07-13

## 2022-07-13 DIAGNOSIS — K57.92 DIVERTICULITIS: Primary | ICD-10-CM

## 2022-07-13 DIAGNOSIS — K57.90 DIVERTICULOSIS: ICD-10-CM

## 2022-09-26 ENCOUNTER — OFFICE VISIT (OUTPATIENT)
Dept: INTERNAL MEDICINE | Facility: CLINIC | Age: 77
End: 2022-09-26

## 2022-09-26 VITALS
HEIGHT: 66 IN | BODY MASS INDEX: 26.74 KG/M2 | OXYGEN SATURATION: 98 % | WEIGHT: 166.4 LBS | SYSTOLIC BLOOD PRESSURE: 107 MMHG | DIASTOLIC BLOOD PRESSURE: 66 MMHG | HEART RATE: 78 BPM | TEMPERATURE: 95.3 F | RESPIRATION RATE: 18 BRPM

## 2022-09-26 DIAGNOSIS — R79.89 ABNORMAL COMPLETE BLOOD COUNT: ICD-10-CM

## 2022-09-26 DIAGNOSIS — K57.92 DIVERTICULITIS: ICD-10-CM

## 2022-09-26 DIAGNOSIS — Z23 NEED FOR COVID-19 VACCINE: ICD-10-CM

## 2022-09-26 DIAGNOSIS — D50.9 IRON DEFICIENCY ANEMIA, UNSPECIFIED IRON DEFICIENCY ANEMIA TYPE: Primary | ICD-10-CM

## 2022-09-26 DIAGNOSIS — R53.83 OTHER FATIGUE: ICD-10-CM

## 2022-09-26 DIAGNOSIS — J30.2 SEASONAL ALLERGIC RHINITIS, UNSPECIFIED TRIGGER: ICD-10-CM

## 2022-09-26 DIAGNOSIS — E53.8 B12 DEFICIENCY: ICD-10-CM

## 2022-09-26 DIAGNOSIS — E78.2 MIXED HYPERLIPIDEMIA: ICD-10-CM

## 2022-09-26 DIAGNOSIS — E55.9 VITAMIN D DEFICIENCY: ICD-10-CM

## 2022-09-26 PROCEDURE — 99213 OFFICE O/P EST LOW 20 MIN: CPT

## 2022-09-26 PROCEDURE — 0124A COVID-19 (PFIZER) BIVALENT BOOSTER 12+YRS: CPT

## 2022-09-26 PROCEDURE — 91312 COVID-19 (PFIZER) BIVALENT BOOSTER 12+YRS: CPT

## 2022-09-26 NOTE — PROGRESS NOTES
"Chief Complaint  Follow-up (Pt states that she is being seen for a follow up. She is doing well overall. )    SUBJECTIVE  Maryam Sandoval presents to [facility] to establish care from Dr. Aguilar    Patient is doing well overall. No complaints.    Denies chest pain, palpitations, issues with bowel/bladder function.    Mammogram-2/2022  DEXA-need to schedule  COVID-19 booster-will get today.    History of Present Illness  Past Medical History:   Diagnosis Date   • Acne rosacea 11/2/2021   • Arthritis    • Asthma 11/2/2021    Asthma episode with extreme mold exposure    • Bronchospasm 11/2/2021   • Diverticulitis 11/2/2021 8/2005. On CT scan    • Flu    • Histoplasmosis 11/2/2021 1977   • Hyperlipidemia 11/2/2021   • Ovarian cyst 11/2/2021 1993      Family History   Problem Relation Age of Onset   • Malig Hyperthermia Neg Hx       Past Surgical History:   Procedure Laterality Date   • APPENDECTOMY     • BLEPHAROPLASTY Bilateral 12/19/2017    Procedure: BILATERAL  BLEPHAROPLASTY LOWER LID ;  Surgeon: Zachery Fermin MD;  [place] [facility] [place] [facility] [place]  Service:    • COLON RESECTION     • ENTROPIAN REPAIR Bilateral 12/19/2017    Procedure: RIGHT LOWER LID ENTROPION REPAIR, LEFT LOWER LID ECTROPION REPAIR;  Surgeon: Zachery Fermin MD;  [place] [facility] [place] [facility] [place]  Service:    • HERNIA REPAIR     • JOINT REPLACEMENT     • KNEE ARTHROPLASTY Right    • LAPAROSCOPIC CHOLECYSTECTOMY          Current Outpatient Medications:   •  montelukast (SINGULAIR) 10 MG tablet, TAKE 1 TABLET DAILY, Disp: 90 tablet, Rfl: 3  •  simvastatin (ZOCOR) 40 MG tablet, TAKE 1 TABLET AT BEDTIME, Disp: 90 tablet, Rfl: 3  •  vitamin D (ERGOCALCIFEROL) 1.25 MG (70157 UT) capsule capsule, TAKE 1 CAPSULE ONCE A WEEK WITH FOOD, Disp: 12 capsule, Rfl: 3    OBJECTIVE  Vital Signs:   /66 (BP Location: Left arm)   Pulse 78   Temp 95.3 °F (35.2 °C) (Temporal)   Resp 18   Ht 167.6 cm (65.98\")  " " Wt 75.5 kg (166 lb 6.4 oz)   SpO2 98%   BMI 26.87 kg/m²    Estimated body mass index is 26.87 kg/m² as calculated from the following:    Height as of this encounter: 167.6 cm (65.98\").    Weight as of this encounter: 75.5 kg (166 lb 6.4 oz).     Wt Readings from Last 3 Encounters:   09/26/22 75.5 kg (166 lb 6.4 oz)   06/22/22 75.3 kg (166 lb)   03/03/22 81.4 kg (179 lb 6.4 oz)     BP Readings from Last 3 Encounters:   09/26/22 107/66   06/22/22 124/77   03/03/22 112/60       Physical Exam  Vitals and nursing note reviewed.   Constitutional:       Appearance: Normal appearance.   HENT:      Head: Normocephalic.   Eyes:      Extraocular Movements: Extraocular movements intact.      Conjunctiva/sclera: Conjunctivae normal.   Cardiovascular:      Rate and Rhythm: Normal rate and regular rhythm.      Heart sounds: Normal heart sounds. No murmur heard.  Pulmonary:      Effort: Pulmonary effort is normal. No respiratory distress.      Breath sounds: Normal breath sounds. No stridor. No wheezing, rhonchi or rales.   Chest:      Chest wall: No tenderness.   Abdominal:      General: Bowel sounds are normal. There is no distension.      Palpations: Abdomen is soft. There is no mass.      Tenderness: There is no abdominal tenderness. There is no right CVA tenderness, left CVA tenderness, guarding or rebound.      Hernia: No hernia is present.   Musculoskeletal:         General: No swelling. Normal range of motion.      Cervical back: Normal range of motion and neck supple.      Right lower leg: No edema.      Left lower leg: No edema.   Skin:     General: Skin is warm and dry.   Neurological:      General: No focal deficit present.      Mental Status: She is alert and oriented to person, place, and time. Mental status is at baseline.   Psychiatric:         Mood and Affect: Mood normal.         Behavior: Behavior normal.         Thought Content: Thought content normal.         Judgment: Judgment normal.          Result " Review    Patient will get labs completed.  No Images in the past 120 days found..     The above data has been reviewed by CHEN Mcintosh 09/26/2022 12:13 EDT.          Patient Care Team:  Sofy Branch APRN as PCP - General (Internal Medicine)     ASSESSMENT & PLAN    Diagnoses and all orders for this visit:    1. Need for COVID-19 vaccine (Primary)  -     COVID-19 Bivalent Booster (Pfizer) 12+yrs    2. Diverticulitis  Overview:  8/2005. On CT scan     Assessment & Plan:  Patient has a history of colonic diverticulosis and diverticulitis.  She is doing well currently.  Plan: Continue to monitor      3. Mixed hyperlipidemia  Assessment & Plan:  Patient did try cutting back on simvastatin to see if it helped with muscle cramps but it did not. She is now taking simvastatin 40 mg qhs.  Plan continue current dose. Check lipid panel.    Orders:  -     CBC & Differential; Future  -     Comprehensive Metabolic Panel; Future  -     Lipid Panel; Future  -     TSH; Future  -     Urinalysis With Microscopic - Urine, Clean Catch; Future    4. Seasonal allergic rhinitis, unspecified trigger  Assessment & Plan:  Well controlled with singulair and flonase.  Continue singulair and flonase      5. B12 deficiency  -     Vitamin B12 & Folate; Future    6. Vitamin D deficiency  -     Vitamin D 25 Hydroxy; Future       Tobacco Use: Low Risk    • Smoking Tobacco Use: Never Smoker   • Smokeless Tobacco Use: Never Used       Follow Up     Return in about 4 months (around 1/26/2023) for Recheck, Medicare Wellness.      Patient was given instructions and counseling regarding her condition or for health maintenance advice. Please see specific information pulled into the AVS if appropriate.   I have reviewed information obtained and documented by others and I have confirmed the accuracy of this documented note.    CHEN Mcintosh

## 2022-09-26 NOTE — ASSESSMENT & PLAN NOTE
Patient has a history of colonic diverticulosis and diverticulitis.  She is doing well currently.  Plan: Continue to monitor

## 2022-09-26 NOTE — ASSESSMENT & PLAN NOTE
Patient did try cutting back on simvastatin to see if it helped with muscle cramps but it did not. She is now taking simvastatin 40 mg qhs.  Plan continue current dose. Check lipid panel.

## 2022-11-03 ENCOUNTER — LAB (OUTPATIENT)
Dept: INTERNAL MEDICINE | Facility: CLINIC | Age: 77
End: 2022-11-03

## 2022-11-03 DIAGNOSIS — D50.9 IRON DEFICIENCY ANEMIA, UNSPECIFIED IRON DEFICIENCY ANEMIA TYPE: ICD-10-CM

## 2022-11-03 DIAGNOSIS — R79.89 ABNORMAL COMPLETE BLOOD COUNT: ICD-10-CM

## 2022-11-03 DIAGNOSIS — R53.83 OTHER FATIGUE: ICD-10-CM

## 2022-11-03 DIAGNOSIS — K57.92 DIVERTICULITIS: ICD-10-CM

## 2022-11-03 DIAGNOSIS — E53.8 B12 DEFICIENCY: ICD-10-CM

## 2022-11-03 DIAGNOSIS — J30.2 SEASONAL ALLERGIC RHINITIS, UNSPECIFIED TRIGGER: ICD-10-CM

## 2022-11-03 DIAGNOSIS — E55.9 VITAMIN D DEFICIENCY: ICD-10-CM

## 2022-11-03 DIAGNOSIS — E78.2 MIXED HYPERLIPIDEMIA: ICD-10-CM

## 2022-11-03 LAB
25(OH)D3 SERPL-MCNC: 32.8 NG/ML (ref 30–100)
ALBUMIN SERPL-MCNC: 4.8 G/DL (ref 3.5–5.2)
ALBUMIN/GLOB SERPL: 2.3 G/DL
ALP SERPL-CCNC: 143 U/L (ref 39–117)
ALT SERPL W P-5'-P-CCNC: 23 U/L (ref 1–33)
ANION GAP SERPL CALCULATED.3IONS-SCNC: 12 MMOL/L (ref 5–15)
AST SERPL-CCNC: 23 U/L (ref 1–32)
BILIRUB SERPL-MCNC: 0.6 MG/DL (ref 0–1.2)
BUN SERPL-MCNC: 16 MG/DL (ref 8–23)
BUN/CREAT SERPL: 20.5 (ref 7–25)
CALCIUM SPEC-SCNC: 10.3 MG/DL (ref 8.6–10.5)
CHLORIDE SERPL-SCNC: 103 MMOL/L (ref 98–107)
CHOLEST SERPL-MCNC: 167 MG/DL (ref 0–200)
CO2 SERPL-SCNC: 26 MMOL/L (ref 22–29)
CREAT SERPL-MCNC: 0.78 MG/DL (ref 0.57–1)
EGFRCR SERPLBLD CKD-EPI 2021: 78.8 ML/MIN/1.73
FERRITIN SERPL-MCNC: 372 NG/ML (ref 13–150)
FOLATE SERPL-MCNC: >20 NG/ML (ref 4.78–24.2)
GLOBULIN UR ELPH-MCNC: 2.1 GM/DL
GLUCOSE SERPL-MCNC: 86 MG/DL (ref 65–99)
HDLC SERPL-MCNC: 46 MG/DL (ref 40–60)
IRON 24H UR-MRATE: 107 MCG/DL (ref 37–145)
IRON SATN MFR SERPL: 27 % (ref 20–50)
LDLC SERPL CALC-MCNC: 91 MG/DL (ref 0–100)
LDLC/HDLC SERPL: 1.88 {RATIO}
POTASSIUM SERPL-SCNC: 4.2 MMOL/L (ref 3.5–5.2)
PROT SERPL-MCNC: 6.9 G/DL (ref 6–8.5)
SODIUM SERPL-SCNC: 141 MMOL/L (ref 136–145)
TIBC SERPL-MCNC: 390 MCG/DL (ref 298–536)
TRANSFERRIN SERPL-MCNC: 262 MG/DL (ref 200–360)
TRIGL SERPL-MCNC: 172 MG/DL (ref 0–150)
TSH SERPL DL<=0.05 MIU/L-ACNC: 1.27 UIU/ML (ref 0.27–4.2)
VIT B12 BLD-MCNC: 624 PG/ML (ref 211–946)
VLDLC SERPL-MCNC: 30 MG/DL (ref 5–40)

## 2022-11-03 PROCEDURE — 82306 VITAMIN D 25 HYDROXY: CPT

## 2022-11-03 PROCEDURE — 82728 ASSAY OF FERRITIN: CPT

## 2022-11-03 PROCEDURE — 36415 COLL VENOUS BLD VENIPUNCTURE: CPT

## 2022-11-03 PROCEDURE — 80053 COMPREHEN METABOLIC PANEL: CPT

## 2022-11-03 PROCEDURE — 84466 ASSAY OF TRANSFERRIN: CPT

## 2022-11-03 PROCEDURE — 82607 VITAMIN B-12: CPT

## 2022-11-03 PROCEDURE — 80061 LIPID PANEL: CPT

## 2022-11-03 PROCEDURE — 83540 ASSAY OF IRON: CPT

## 2022-11-03 PROCEDURE — 82746 ASSAY OF FOLIC ACID SERUM: CPT

## 2022-11-03 PROCEDURE — 84443 ASSAY THYROID STIM HORMONE: CPT

## 2022-12-22 ENCOUNTER — HOSPITAL ENCOUNTER (OUTPATIENT)
Dept: GENERAL RADIOLOGY | Facility: HOSPITAL | Age: 77
Discharge: HOME | End: 2022-12-22

## 2022-12-22 ENCOUNTER — OFFICE VISIT (OUTPATIENT)
Dept: INTERNAL MEDICINE | Facility: CLINIC | Age: 77
End: 2022-12-22

## 2022-12-22 VITALS
WEIGHT: 174.4 LBS | HEART RATE: 65 BPM | DIASTOLIC BLOOD PRESSURE: 68 MMHG | TEMPERATURE: 95.9 F | BODY MASS INDEX: 28.03 KG/M2 | HEIGHT: 66 IN | RESPIRATION RATE: 18 BRPM | OXYGEN SATURATION: 98 % | SYSTOLIC BLOOD PRESSURE: 116 MMHG

## 2022-12-22 DIAGNOSIS — J06.9 ACUTE URI: Primary | ICD-10-CM

## 2022-12-22 DIAGNOSIS — R05.3 PERSISTENT COUGH: ICD-10-CM

## 2022-12-22 DIAGNOSIS — R05.1 ACUTE COUGH: ICD-10-CM

## 2022-12-22 DIAGNOSIS — J06.9 ACUTE URI: ICD-10-CM

## 2022-12-22 DIAGNOSIS — B37.9 CANDIDIASIS: ICD-10-CM

## 2022-12-22 LAB
EXPIRATION DATE: NORMAL
FLUAV AG UPPER RESP QL IA.RAPID: NOT DETECTED
FLUBV AG UPPER RESP QL IA.RAPID: NOT DETECTED
INTERNAL CONTROL: NORMAL
Lab: NORMAL
SARS-COV-2 AG UPPER RESP QL IA.RAPID: NOT DETECTED

## 2022-12-22 PROCEDURE — 87428 SARSCOV & INF VIR A&B AG IA: CPT

## 2022-12-22 PROCEDURE — 99213 OFFICE O/P EST LOW 20 MIN: CPT

## 2022-12-22 PROCEDURE — 71046 X-RAY EXAM CHEST 2 VIEWS: CPT

## 2022-12-22 RX ORDER — AMOXICILLIN AND CLAVULANATE POTASSIUM 875; 125 MG/1; MG/1
1 TABLET, FILM COATED ORAL 2 TIMES DAILY
Qty: 20 TABLET | Refills: 0 | Status: SHIPPED | OUTPATIENT
Start: 2022-12-22 | End: 2023-01-01

## 2022-12-22 NOTE — PROGRESS NOTES
Chief Complaint  Sore Throat (Pt states that she has a sore throat and cough she states that she also has sores in her mouth that just started. She states she does have some drainage that has been going on for a month. )    History of Present Illness  SUBJECTIVE  Maryam Sandoval presents to [facility] with complaints of sore throat, cough, and sores in her mouth. She states it has been going on for about a month.   She went to urgent care and was given abx and steroids.   Denies sinus pressure, shortness of breath, congestion, nausea, vomiting, or diarrhea.  No relief with Tessalon in the past.    Past Medical History:   Diagnosis Date   • Acne rosacea 11/2/2021   • Arthritis    • Asthma 11/2/2021    Asthma episode with extreme mold exposure    • Bronchospasm 11/2/2021   • Diverticulitis 11/2/2021 8/2005. On CT scan    • Flu    • Histoplasmosis 11/2/2021 1977   • Hyperlipidemia 11/2/2021   • Ovarian cyst 11/2/2021 1993      Family History   Problem Relation Age of Onset   • Malig Hyperthermia Neg Hx       Past Surgical History:   Procedure Laterality Date   • APPENDECTOMY     • BLEPHAROPLASTY Bilateral 12/19/2017    Procedure: BILATERAL  BLEPHAROPLASTY LOWER LID ;  Surgeon: Zachery Fermin MD;  Location: [facility];  Service:    • COLON RESECTION     • ENTROPIAN REPAIR Bilateral 12/19/2017    Procedure: RIGHT LOWER LID ENTROPION REPAIR, LEFT LOWER LID ECTROPION REPAIR;  Surgeon: Zachery Fermin MD;  [place] [facility] [place] [facility] [place]  [place]:    • HERNIA REPAIR     • JOINT REPLACEMENT     • KNEE ARTHROPLASTY Right    • LAPAROSCOPIC CHOLECYSTECTOMY          Current Outpatient Medications:   •  montelukast (SINGULAIR) 10 MG tablet, TAKE 1 TABLET DAILY, Disp: 90 tablet, Rfl: 3  •  simvastatin (ZOCOR) 40 MG tablet, TAKE 1 TABLET AT BEDTIME, Disp: 90 tablet, Rfl: 3  •  vitamin D (ERGOCALCIFEROL) 1.25 MG (18207 UT) capsule capsule, TAKE 1 CAPSULE ONCE A WEEK  "WITH FOOD, Disp: 12 capsule, Rfl: 3  •  amoxicillin-clavulanate (Augmentin) 875-125 MG per tablet, Take 1 tablet by mouth 2 (Two) Times a Day for 10 days. Take with food., Disp: 20 tablet, Rfl: 0  •  HYDROcod Polst-CPM Polst ER (Tussionex Pennkinetic ER) 10-8 MG/5ML ER suspension, Take 5 mL by mouth Every 12 (Twelve) Hours As Needed for Cough., Disp: 60 mL, Rfl: 0  •  nystatin (MYCOSTATIN) 100,000 unit/mL suspension, Swish and swallow 5 mL 4 (Four) Times a Day., Disp: 240 mL, Rfl: 0    OBJECTIVE  Vital Signs:   /68 (BP Location: Right arm)   Pulse 65   Temp 95.9 °F (35.5 °C) (Temporal)   Resp 18   Ht 167.6 cm (65.98\")   Wt 79.1 kg (174 lb 6.4 oz)   SpO2 98%   BMI 28.16 kg/m²    Estimated body mass index is 28.16 kg/m² as calculated from the following:    Height as of this encounter: 167.6 cm (65.98\").    Weight as of this encounter: 79.1 kg (174 lb 6.4 oz).     Wt Readings from Last 3 Encounters:   12/22/22 79.1 kg (174 lb 6.4 oz)   09/26/22 75.5 kg (166 lb 6.4 oz)   06/22/22 75.3 kg (166 lb)     BP Readings from Last 3 Encounters:   12/22/22 116/68   09/26/22 107/66   06/22/22 124/77       Physical Exam  Constitutional:       Appearance: Normal appearance.   HENT:      Head: Normocephalic and atraumatic.      Right Ear: A middle ear effusion is present.      Left Ear: A middle ear effusion is present.   Eyes:      Extraocular Movements: Extraocular movements intact.      Conjunctiva/sclera: Conjunctivae normal.   Cardiovascular:      Rate and Rhythm: Normal rate.   Pulmonary:      Effort: Pulmonary effort is normal.      Breath sounds: Normal breath sounds. No wheezing.   Abdominal:      General: Bowel sounds are normal. There is no distension.      Palpations: Abdomen is soft. There is no mass.      Tenderness: There is no abdominal tenderness. There is no right CVA tenderness, left CVA tenderness or guarding.      Hernia: No hernia is present.   Musculoskeletal:         General: Normal range of " motion.      Cervical back: Normal range of motion and neck supple.   Neurological:      Mental Status: She is alert and oriented to person, place, and time.   Psychiatric:         Mood and Affect: Mood normal.         Behavior: Behavior normal.         Thought Content: Thought content normal.         Judgment: Judgment normal.          Result Review      No Images in the past 120 days found..     The above data has been reviewed by CHEN Mcintosh 12/22/2022 13:21 EST.          Patient Care Team:  Sofy Branch APRN as PCP - General (Internal Medicine)       ASSESSMENT & PLAN    Diagnoses and all orders for this visit:    1. Acute URI (Primary)  Assessment & Plan:  Patient complains of persistent cough, sore throat.  She states she was given some antibiotics and steroids at urgent care but is not getting any relief.    She denies any sinus pressure, congestion, nausea, fever, shortness of breath, vomiting or diarrhea.  Plan: We will add in Augmentin and prednisone.  We will also get a chest x-ray due to persistent cough.  We will also go ahead and give her some Tussionex cough syrup.  Levon reviewed.    Orders:  -     amoxicillin-clavulanate (Augmentin) 875-125 MG per tablet; Take 1 tablet by mouth 2 (Two) Times a Day for 10 days. Take with food.  Dispense: 20 tablet; Refill: 0  -     HYDROcod Polst-CPM Polst ER (Tussionex Pennkinetic ER) 10-8 MG/5ML ER suspension; Take 5 mL by mouth Every 12 (Twelve) Hours As Needed for Cough.  Dispense: 60 mL; Refill: 0  -     XR Chest PA & Lateral; Future    2. Candidiasis  -     nystatin (MYCOSTATIN) 100,000 unit/mL suspension; Swish and swallow 5 mL 4 (Four) Times a Day.  Dispense: 240 mL; Refill: 0    3. Acute cough  -     POCT SARS-CoV-2 Antigen RAJAT + Flu    4. Persistent cough  -     HYDROcod Polst-CPM Polst ER (Tussionex Pennkinetic ER) 10-8 MG/5ML ER suspension; Take 5 mL by mouth Every 12 (Twelve) Hours As Needed for Cough.  Dispense: 60 mL; Refill: 0  -     XR Chest  PA & Lateral; Future  -     POCT SARS-CoV-2 Antigen RAJAT + Flu       Tobacco Use: Low Risk    • Smoking Tobacco Use: Never   • Smokeless Tobacco Use: Never   • Passive Exposure: Not on file       Follow Up     Return if symptoms worsen or fail to improve.    Please note that portions of this note were completed with a voice recognition program.    Patient was given instructions and counseling regarding her condition or for health maintenance advice. Please see specific information pulled into the AVS if appropriate.   I have reviewed information obtained and documented by others and I have confirmed the accuracy of this documented note.    Sofy Branch APRN

## 2022-12-22 NOTE — ASSESSMENT & PLAN NOTE
Patient complains of persistent cough, sore throat.  She states she was given some antibiotics and steroids at urgent care but is not getting any relief.    She denies any sinus pressure, congestion, nausea, fever, shortness of breath, vomiting or diarrhea.  Plan: We will add in Augmentin and prednisone.  We will also get a chest x-ray due to persistent cough.  We will also go ahead and give her some Tussionex cough syrup.  Levon snyder.

## 2023-01-05 ENCOUNTER — OFFICE VISIT (OUTPATIENT)
Dept: INTERNAL MEDICINE | Facility: CLINIC | Age: 78
End: 2023-01-05
Payer: MEDICARE

## 2023-01-05 VITALS
WEIGHT: 170 LBS | SYSTOLIC BLOOD PRESSURE: 123 MMHG | HEIGHT: 66 IN | RESPIRATION RATE: 18 BRPM | DIASTOLIC BLOOD PRESSURE: 72 MMHG | TEMPERATURE: 97 F | HEART RATE: 69 BPM | OXYGEN SATURATION: 98 % | BODY MASS INDEX: 27.32 KG/M2

## 2023-01-05 DIAGNOSIS — Z00.00 MEDICARE ANNUAL WELLNESS VISIT, INITIAL: Primary | ICD-10-CM

## 2023-01-05 DIAGNOSIS — Z87.09 HISTORY OF FREQUENT UPPER RESPIRATORY INFECTION: ICD-10-CM

## 2023-01-05 PROCEDURE — 1170F FXNL STATUS ASSESSED: CPT

## 2023-01-05 PROCEDURE — 1159F MED LIST DOCD IN RCRD: CPT

## 2023-01-05 PROCEDURE — G0439 PPPS, SUBSEQ VISIT: HCPCS

## 2023-01-05 NOTE — PROGRESS NOTES
The ABCs of the Annual Wellness Visit  Initial Medicare Wellness Visit    Subjective   {Wrapup  Review (Popup)  Advance Care Planning  Labs  CC  Problem List  Visit Diagnosis  Medications  Result Review  Imaging  [facility]  [facility]  [facility]  SnapShot  Encounters  Notes  Media  Procedures :23}  Maryam Sandoval is a 77 y.o. female who presents for an Initial Medicare Wellness Visit.    The following portions of the patient's history were reviewed and   updated as appropriate: {history reviewed:20406::\"allergies\",\"current medications\",\"past family history\",\"past medical history\",\"past social history\",\"past surgical history\",\"problem list\"}.     Compared to one year ago, the patient feels her physical   health is {better worse same:30152}.    Compared to one year ago, the patient feels her mental   health is {better worse same:10457}.    Recent Hospitalizations:  {Hospital Admission Status in the last 365 days:71733}      Current Medical Providers:  Patient Care Team:  Sofy Branch APRN as PCP - General (Internal Medicine)    Outpatient Medications Prior to Visit   Medication Sig Dispense Refill   • HYDROcod Polst-CPM Polst ER (Tussionex Pennkinetic ER) 10-8 MG/5ML ER suspension Take 5 mL by mouth Every 12 (Twelve) Hours As Needed for Cough. 60 mL 0   • montelukast (SINGULAIR) 10 MG tablet TAKE 1 TABLET DAILY 90 tablet 3   • nystatin (MYCOSTATIN) 100,000 unit/mL suspension Swish and swallow 5 mL 4 (Four) Times a Day. 240 mL 0   • simvastatin (ZOCOR) 40 MG tablet TAKE 1 TABLET AT BEDTIME 90 tablet 3   • vitamin D (ERGOCALCIFEROL) 1.25 MG (18950 UT) capsule capsule TAKE 1 CAPSULE ONCE A WEEK WITH FOOD 12 capsule 3     No facility-administered medications prior to visit.       No opioid medication identified on active medication list. I have reviewed chart for other potential  high risk medication/s and harmful drug interactions in the elderly.          Aspirin is not on  active medication list.  {ASPIRIN NOT ON MEDICATION LIST INDICATED/NOT INDICATED:84690}.    Patient Active Problem List   Diagnosis   • Acute pancreatitis   • Histoplasmosis   • Ovarian cyst   • Asthma   • Hyperlipidemia   • Acne rosacea   • Diverticulitis   • Bronchospasm   • Colon abnormality   • Seasonal allergic rhinitis   • Osteopenia of hip   • Chronic pain of both shoulders   • Myalgia due to statin   • Acute URI     Advance Care Planning  Advance Directive is on file.  {ACP Discussion, Advance Directive in EMR:35159}       Objective    Vitals:    01/05/23 1035   BP: 123/72   BP Location: Right arm   Pulse: 69   Resp: 18   Temp: 97 °F (36.1 °C)   TempSrc: Temporal   SpO2: 98%   Weight: 77.1 kg (170 lb)   Height: 167.6 cm (65.98\")     Estimated body mass index is 27.45 kg/m² as calculated from the following:    Height as of this encounter: 167.6 cm (65.98\").    Weight as of this encounter: 77.1 kg (170 lb).    {BMI is >= 25 and <30. (Overweight) The following options were offered after discussion;:[id number]}      Does the patient have evidence of cognitive impairment?   {Yes/No:77166}    Lab Results   Component Value Date    TRIG 172 (H) 11/03/2022    HDL 46 11/03/2022    LDL 91 11/03/2022    VLDL 30 11/03/2022        HEALTH RISK ASSESSMENT    Smoking Status:  Social History     Tobacco Use   Smoking Status Never   Smokeless Tobacco Never     Alcohol Consumption:  Social History     Substance and Sexual Activity   Alcohol Use Yes    Comment: RARELY     Fall Risk Screen:    STEADI Fall Risk Assessment was completed, and patient is at LOW risk for falls.Assessment completed on:1/5/2023    Depression Screen:   PHQ-2/PHQ-9 Depression Screening 1/5/2023   Little Interest or Pleasure in Doing Things 0-->not at all   Feeling Down, Depressed or Hopeless 0-->not at all   PHQ-9: Brief Depression Severity Measure Score 0       Health Habits and Functional and Cognitive Screening:  Functional & Cognitive Status 1/5/2023    Do you have difficulty preparing food and eating? No   Do you have difficulty bathing yourself, getting dressed or grooming yourself? No   Do you have difficulty using the toilet? No   Do you have difficulty moving around from place to place? No   Do you have trouble with steps or getting out of a bed or a chair? No   Current Diet Limited Junk Food   Dental Exam Up to date   Eye Exam Up to date   Exercise (times per week) 3 times per week   Current Exercises Include Walking   Do you need help using the phone?  No   Are you deaf or do you have serious difficulty hearing?  No   Do you need help with transportation? No   Do you need help shopping? No   Do you need help preparing meals?  No   Do you need help with housework?  No   Do you need help with laundry? No   Do you need help taking your medications? No   Do you need help managing money? No   Do you ever drive or ride in a car without wearing a seat belt? No   Have you felt unusual stress, anger or loneliness in the last month? No   Who do you live with? Spouse   If you need help, do you have trouble finding someone available to you? No   Have you been bothered in the last four weeks by sexual problems? No       Age-appropriate Screening Schedule:  Refer to the list below for future screening recommendations based on patient's age, sex and/or medical conditions. Orders for these recommended tests are listed in the plan section. The patient has been provided with a written plan.    Health Maintenance   Topic Date Due   • DXA SCAN  01/13/2022   • LIPID PANEL  11/03/2023   • MAMMOGRAM  02/11/2024   • TDAP/TD VACCINES (3 - Tdap) 09/25/2029   • INFLUENZA VACCINE  Completed   • ZOSTER VACCINE  Completed          CMS Preventative Services Quick Reference  Risk Factors Identified During Encounter    {Medicare Wellness Risk Factors:23502}    The above risks/problems have been discussed with the patient.  Pertinent information has been shared with the patient in the  After Visit Summary.  An After Visit Summary and PPPS were made available to the patient.  There are no diagnoses linked to this encounter.  Follow Up:  Next Medicare Wellness visit to be scheduled in 1 year.

## 2023-01-05 NOTE — PROGRESS NOTES
The ABCs of the Annual Wellness Visit  Initial Medicare Wellness Visit    Radha Sandoval is a 77 y.o. female who presents for an Initial Medicare Wellness Visit.    The following portions of the patient's history were reviewed and   updated as appropriate: allergies, current medications, past family history, past medical history, past social history, past surgical history and problem list.     Compared to one year ago, the patient feels her physical   health is worse. Pt does feel like she gets sick a little more that normal.     Compared to one year ago, the patient feels her mental   health is the same.    Recent Hospitalizations:  She was not admitted to the hospital during the last year.       Current Medical Providers:  Patient Care Team:  Sofy Branch APRN as PCP - General (Internal Medicine)    Outpatient Medications Prior to Visit   Medication Sig Dispense Refill   • HYDROcod Polst-CPM Polst ER (Tussionex Pennkinetic ER) 10-8 MG/5ML ER suspension Take 5 mL by mouth Every 12 (Twelve) Hours As Needed for Cough. 60 mL 0   • montelukast (SINGULAIR) 10 MG tablet TAKE 1 TABLET DAILY 90 tablet 3   • nystatin (MYCOSTATIN) 100,000 unit/mL suspension Swish and swallow 5 mL 4 (Four) Times a Day. 240 mL 0   • simvastatin (ZOCOR) 40 MG tablet TAKE 1 TABLET AT BEDTIME 90 tablet 3   • vitamin D (ERGOCALCIFEROL) 1.25 MG (46772 UT) capsule capsule TAKE 1 CAPSULE ONCE A WEEK WITH FOOD 12 capsule 3     No facility-administered medications prior to visit.       No opioid medication identified on active medication list. I have reviewed chart for other potential  high risk medication/s and harmful drug interactions in the elderly.          Aspirin is not on active medication list.  Aspirin use is not indicated based on review of current medical condition/s. Risk of harm outweighs potential benefits.  .    Patient Active Problem List   Diagnosis   • Acute pancreatitis   • Histoplasmosis   • Ovarian cyst    • Asthma   • Hyperlipidemia   • Acne rosacea   • Diverticulitis   • Bronchospasm   • Colon abnormality   • Seasonal allergic rhinitis   • Osteopenia of hip   • Chronic pain of both shoulders   • Myalgia due to statin   • Acute URI     Advance Care Planning  Advance Directive is on file.  ACP discussion was held with the patient during this visit. Patient has an advance directive in EMR which is still valid.        Objective    Vitals:    01/05/23 1035   BP: 123/72   BP Location: Right arm   Pulse: 69   Resp: 18   Temp: 97 °F (36.1 °C)   TempSrc: Temporal   SpO2: 98%   Weight: 77.1 kg (170 lb)   Height: 167.6 cm (65.98\")     Estimated body mass index is 27.45 kg/m² as calculated from the following:    Height as of this encounter: 167.6 cm (65.98\").    Weight as of this encounter: 77.1 kg (170 lb).    BMI is >= 25 and <30. (Overweight) The following options were offered after discussion;: exercise counseling/recommendations and nutrition counseling/recommendations      Does the patient have evidence of cognitive impairment?   No    Lab Results   Component Value Date    TRIG 172 (H) 11/03/2022    HDL 46 11/03/2022    LDL 91 11/03/2022    VLDL 30 11/03/2022        HEALTH RISK ASSESSMENT    Smoking Status:  Social History     Tobacco Use   Smoking Status Never   Smokeless Tobacco Never     Alcohol Consumption:  Social History     Substance and Sexual Activity   Alcohol Use Yes    Comment: RARELY     Fall Risk Screen:    ANGEL Fall Risk Assessment was completed, and patient is at LOW risk for falls.Assessment completed on:1/5/2023    Depression Screen:   PHQ-2/PHQ-9 Depression Screening 1/5/2023   Little Interest or Pleasure in Doing Things 0-->not at all   Feeling Down, Depressed or Hopeless 0-->not at all   PHQ-9: Brief Depression Severity Measure Score 0       Health Habits and Functional and Cognitive Screening:  Functional & Cognitive Status 1/5/2023   Do you have difficulty preparing food and eating? No   Do you  have difficulty bathing yourself, getting dressed or grooming yourself? No   Do you have difficulty using the toilet? No   Do you have difficulty moving around from place to place? No   Do you have trouble with steps or getting out of a bed or a chair? No   Current Diet Limited Junk Food   Dental Exam Up to date   Eye Exam Up to date   Exercise (times per week) 3 times per week   Current Exercises Include Walking   Do you need help using the phone?  No   Are you deaf or do you have serious difficulty hearing?  No   Do you need help with transportation? No   Do you need help shopping? No   Do you need help preparing meals?  No   Do you need help with housework?  No   Do you need help with laundry? No   Do you need help taking your medications? No   Do you need help managing money? No   Do you ever drive or ride in a car without wearing a seat belt? No   Have you felt unusual stress, anger or loneliness in the last month? No   Who do you live with? Spouse   If you need help, do you have trouble finding someone available to you? No   Have you been bothered in the last four weeks by sexual problems? No       Age-appropriate Screening Schedule:  Refer to the list below for future screening recommendations based on patient's age, sex and/or medical conditions. Orders for these recommended tests are listed in the plan section. The patient has been provided with a written plan.    Health Maintenance   Topic Date Due   • DXA SCAN  01/13/2022   • LIPID PANEL  11/03/2023   • MAMMOGRAM  02/11/2024   • TDAP/TD VACCINES (3 - Tdap) 09/25/2029   • INFLUENZA VACCINE  Completed   • ZOSTER VACCINE  Completed          CMS Preventative Services Quick Reference  Risk Factors Identified During Encounter    None Identified     Patient does get regular dental and eye exams.  She is up to date on vaccinations and screenings at this time.    The above risks/problems have been discussed with the patient.  Pertinent information has been shared  with the patient in the After Visit Summary.  An After Visit Summary and PPPS were made available to the patient.  Diagnoses and all orders for this visit:    1. Medicare annual wellness visit, initial (Primary)    2. History of frequent upper respiratory infection  -     CT Chest Without Contrast; Future      Follow Up:  Next Medicare Wellness visit to be scheduled in 1 year.

## 2023-01-27 ENCOUNTER — HOSPITAL ENCOUNTER (OUTPATIENT)
Dept: CT IMAGING | Facility: HOSPITAL | Age: 78
Discharge: HOME | End: 2023-01-27
Payer: MEDICARE

## 2023-01-27 DIAGNOSIS — Z87.09 HISTORY OF FREQUENT UPPER RESPIRATORY INFECTION: ICD-10-CM

## 2023-01-27 PROCEDURE — 71250 CT THORAX DX C-: CPT

## 2023-02-06 ENCOUNTER — TRANSCRIBE ORDERS (OUTPATIENT)
Dept: ADMINISTRATIVE | Facility: HOSPITAL | Age: 78
End: 2023-02-06
Payer: MEDICARE

## 2023-02-06 DIAGNOSIS — Z12.31 SCREENING MAMMOGRAM FOR BREAST CANCER: Primary | ICD-10-CM

## 2023-02-21 ENCOUNTER — TELEPHONE (OUTPATIENT)
Dept: INTERNAL MEDICINE | Facility: CLINIC | Age: 78
End: 2023-02-21
Payer: MEDICARE

## 2023-02-22 RX ORDER — ERGOCALCIFEROL 1.25 MG/1
50000 CAPSULE ORAL
Qty: 12 CAPSULE | Refills: 3 | Status: SHIPPED | OUTPATIENT
Start: 2023-02-22

## 2023-02-23 ENCOUNTER — TELEPHONE (OUTPATIENT)
Dept: INTERNAL MEDICINE | Facility: CLINIC | Age: 78
End: 2023-02-23

## 2023-02-23 NOTE — TELEPHONE ENCOUNTER
Pharmacy Name: [facility] - [place], [place] - [address] [phone] [facility] [phone]      Pharmacy representative name: HONEY    Pharmacy representative phone number:  [phone], FAX [phone]    What medication are you calling in regards to:   - ADVAIR DISKUS 60     What question does the pharmacy have: PATIENT IS NEEDING REFILL OF THIS MEDICATION SENT AS 90 SUPPLY WITH 3 ADDITIONAL REFILLS.     Additional notes:   PHARMACY IS CHECKING STATUS ON STATUS OF THIS REFILL.

## 2023-02-24 RX ORDER — FLUTICASONE PROPIONATE AND SALMETEROL 100; 50 UG/1; UG/1
1 POWDER RESPIRATORY (INHALATION) AS NEEDED
COMMUNITY
End: 2023-02-24 | Stop reason: SDUPTHER

## 2023-02-24 RX ORDER — FLUTICASONE PROPIONATE AND SALMETEROL 100; 50 UG/1; UG/1
1 POWDER RESPIRATORY (INHALATION)
Qty: 180 EACH | Refills: 0 | Status: SHIPPED | OUTPATIENT
Start: 2023-02-24

## 2023-05-02 RX ORDER — MONTELUKAST SODIUM 10 MG/1
10 TABLET ORAL DAILY
Qty: 90 TABLET | Refills: 3 | Status: SHIPPED | OUTPATIENT
Start: 2023-05-02

## 2023-05-02 NOTE — TELEPHONE ENCOUNTER
Rx Refill Note  Requested Prescriptions     Pending Prescriptions Disp Refills   • montelukast (SINGULAIR) 10 MG tablet 90 tablet 3     Sig: Take 1 tablet by mouth Daily.      Last office visit with prescribing clinician: 1/5/2023   Last telemedicine visit with prescribing clinician: 5/26/2023   Next office visit with prescribing clinician: 5/26/2023                         Would you like a call back once the refill request has been completed: [] Yes [] No    If the office needs to give you a call back, can they leave a voicemail: [] Yes [] No    Adela Frank MA  05/02/23, 14:03 EDT

## 2023-05-10 ENCOUNTER — HOSPITAL ENCOUNTER (OUTPATIENT)
Dept: MAMMOGRAPHY | Facility: HOSPITAL | Age: 78
Discharge: HOME | End: 2023-05-10
Payer: MEDICARE

## 2023-05-10 DIAGNOSIS — Z12.31 SCREENING MAMMOGRAM FOR BREAST CANCER: ICD-10-CM

## 2023-05-10 PROCEDURE — 77067 SCR MAMMO BI INCL CAD: CPT

## 2023-05-10 PROCEDURE — 77063 BREAST TOMOSYNTHESIS BI: CPT

## 2023-05-15 RX ORDER — SIMVASTATIN 40 MG
40 TABLET ORAL
Qty: 90 TABLET | Refills: 3 | Status: SHIPPED | OUTPATIENT
Start: 2023-05-15

## 2023-05-15 NOTE — TELEPHONE ENCOUNTER
Rx Refill Note  Requested Prescriptions     Pending Prescriptions Disp Refills   • simvastatin (ZOCOR) 40 MG tablet 90 tablet 3     Sig: Take 1 tablet by mouth every night at bedtime.      Last office visit with prescribing clinician: 1/5/2023   Last telemedicine visit with prescribing clinician: 5/2/2023   Next office visit with prescribing clinician: 5/26/2023                         Would you like a call back once the refill request has been completed: [] Yes [] No    If the office needs to give you a call back, can they leave a voicemail: [] Yes [] No    Adela Frank MA  05/15/23, 10:22 EDT

## 2023-05-16 ENCOUNTER — LAB (OUTPATIENT)
Dept: INTERNAL MEDICINE | Facility: CLINIC | Age: 78
End: 2023-05-16
Payer: MEDICARE

## 2023-05-16 DIAGNOSIS — K57.92 DIVERTICULITIS: ICD-10-CM

## 2023-05-16 DIAGNOSIS — R53.83 OTHER FATIGUE: ICD-10-CM

## 2023-05-16 DIAGNOSIS — E78.2 MIXED HYPERLIPIDEMIA: ICD-10-CM

## 2023-05-16 DIAGNOSIS — I10 PRIMARY HYPERTENSION: ICD-10-CM

## 2023-05-16 DIAGNOSIS — E55.9 VITAMIN D DEFICIENCY: Primary | ICD-10-CM

## 2023-05-16 DIAGNOSIS — J30.2 SEASONAL ALLERGIC RHINITIS, UNSPECIFIED TRIGGER: ICD-10-CM

## 2023-05-16 DIAGNOSIS — D50.9 IRON DEFICIENCY ANEMIA, UNSPECIFIED IRON DEFICIENCY ANEMIA TYPE: ICD-10-CM

## 2023-05-16 DIAGNOSIS — E53.8 B12 DEFICIENCY: ICD-10-CM

## 2023-05-16 DIAGNOSIS — R79.89 ABNORMAL COMPLETE BLOOD COUNT: ICD-10-CM

## 2023-05-16 LAB
25(OH)D3 SERPL-MCNC: 37.4 NG/ML (ref 30–100)
ALBUMIN SERPL-MCNC: 4.5 G/DL (ref 3.5–5.2)
ALBUMIN/GLOB SERPL: 1.8 G/DL
ALP SERPL-CCNC: 161 U/L (ref 39–117)
ALT SERPL W P-5'-P-CCNC: 23 U/L (ref 1–33)
ANION GAP SERPL CALCULATED.3IONS-SCNC: 9 MMOL/L (ref 5–15)
AST SERPL-CCNC: 25 U/L (ref 1–32)
BASOPHILS # BLD AUTO: 0.07 10*3/MM3 (ref 0–0.2)
BASOPHILS NFR BLD AUTO: 0.9 % (ref 0–1.5)
BILIRUB SERPL-MCNC: 0.5 MG/DL (ref 0–1.2)
BUN SERPL-MCNC: 14 MG/DL (ref 8–23)
BUN/CREAT SERPL: 15.9 (ref 7–25)
CALCIUM SPEC-SCNC: 9.9 MG/DL (ref 8.6–10.5)
CHLORIDE SERPL-SCNC: 106 MMOL/L (ref 98–107)
CHOLEST SERPL-MCNC: 152 MG/DL (ref 0–200)
CO2 SERPL-SCNC: 26 MMOL/L (ref 22–29)
CREAT SERPL-MCNC: 0.88 MG/DL (ref 0.57–1)
DEPRECATED RDW RBC AUTO: 39.6 FL (ref 37–54)
EGFRCR SERPLBLD CKD-EPI 2021: 68.2 ML/MIN/1.73
EOSINOPHIL # BLD AUTO: 0.2 10*3/MM3 (ref 0–0.4)
EOSINOPHIL NFR BLD AUTO: 2.6 % (ref 0.3–6.2)
ERYTHROCYTE [DISTWIDTH] IN BLOOD BY AUTOMATED COUNT: 12 % (ref 12.3–15.4)
FOLATE SERPL-MCNC: >20 NG/ML (ref 4.78–24.2)
GLOBULIN UR ELPH-MCNC: 2.5 GM/DL
GLUCOSE SERPL-MCNC: 98 MG/DL (ref 65–99)
HCT VFR BLD AUTO: 42.6 % (ref 34–46.6)
HDLC SERPL-MCNC: 34 MG/DL (ref 40–60)
HGB BLD-MCNC: 15.2 G/DL (ref 12–15.9)
IMM GRANULOCYTES # BLD AUTO: 0.03 10*3/MM3 (ref 0–0.05)
IMM GRANULOCYTES NFR BLD AUTO: 0.4 % (ref 0–0.5)
IRON 24H UR-MRATE: 97 MCG/DL (ref 37–145)
IRON SATN MFR SERPL: 27 % (ref 20–50)
LDLC SERPL CALC-MCNC: 82 MG/DL (ref 0–100)
LDLC/HDLC SERPL: 2.2 {RATIO}
LYMPHOCYTES # BLD AUTO: 2.39 10*3/MM3 (ref 0.7–3.1)
LYMPHOCYTES NFR BLD AUTO: 31.5 % (ref 19.6–45.3)
MCH RBC QN AUTO: 32.5 PG (ref 26.6–33)
MCHC RBC AUTO-ENTMCNC: 35.7 G/DL (ref 31.5–35.7)
MCV RBC AUTO: 91.2 FL (ref 79–97)
MONOCYTES # BLD AUTO: 0.79 10*3/MM3 (ref 0.1–0.9)
MONOCYTES NFR BLD AUTO: 10.4 % (ref 5–12)
NEUTROPHILS NFR BLD AUTO: 4.1 10*3/MM3 (ref 1.7–7)
NEUTROPHILS NFR BLD AUTO: 54.2 % (ref 42.7–76)
NRBC BLD AUTO-RTO: 0 /100 WBC (ref 0–0.2)
PLATELET # BLD AUTO: 213 10*3/MM3 (ref 140–450)
PMV BLD AUTO: 8.9 FL (ref 6–12)
POTASSIUM SERPL-SCNC: 4.4 MMOL/L (ref 3.5–5.2)
PROT SERPL-MCNC: 7 G/DL (ref 6–8.5)
RBC # BLD AUTO: 4.67 10*6/MM3 (ref 3.77–5.28)
SODIUM SERPL-SCNC: 141 MMOL/L (ref 136–145)
TIBC SERPL-MCNC: 356 MCG/DL (ref 298–536)
TRANSFERRIN SERPL-MCNC: 239 MG/DL (ref 200–360)
TRIGL SERPL-MCNC: 216 MG/DL (ref 0–150)
VIT B12 BLD-MCNC: 710 PG/ML (ref 211–946)
VLDLC SERPL-MCNC: 36 MG/DL (ref 5–40)
WBC NRBC COR # BLD: 7.58 10*3/MM3 (ref 3.4–10.8)

## 2023-05-16 PROCEDURE — 83540 ASSAY OF IRON: CPT

## 2023-05-16 PROCEDURE — 85025 COMPLETE CBC W/AUTO DIFF WBC: CPT

## 2023-05-16 PROCEDURE — 80053 COMPREHEN METABOLIC PANEL: CPT

## 2023-05-16 PROCEDURE — 80061 LIPID PANEL: CPT

## 2023-05-16 PROCEDURE — 82746 ASSAY OF FOLIC ACID SERUM: CPT

## 2023-05-16 PROCEDURE — 82607 VITAMIN B-12: CPT

## 2023-05-16 PROCEDURE — 36415 COLL VENOUS BLD VENIPUNCTURE: CPT

## 2023-05-16 PROCEDURE — 82306 VITAMIN D 25 HYDROXY: CPT

## 2023-05-16 PROCEDURE — 84466 ASSAY OF TRANSFERRIN: CPT

## 2023-05-23 ENCOUNTER — TELEPHONE (OUTPATIENT)
Dept: INTERNAL MEDICINE | Facility: CLINIC | Age: 78
End: 2023-05-23

## 2023-05-23 NOTE — TELEPHONE ENCOUNTER
Caller: Maryam Sandoval    Relationship: Self    Best call back number: [phone]    What test was performed: MAMMOGRAM     When was the test performed:5-10-23    [facility] the [facility]     Additional notes:     PATIENT WOULD LIKE A CALL BACK REGARDING RESULTS     PLEASE ADVISE

## 2023-05-26 ENCOUNTER — OFFICE VISIT (OUTPATIENT)
Dept: INTERNAL MEDICINE | Facility: CLINIC | Age: 78
End: 2023-05-26
Payer: MEDICARE

## 2023-05-26 VITALS
HEIGHT: 66 IN | SYSTOLIC BLOOD PRESSURE: 115 MMHG | OXYGEN SATURATION: 97 % | TEMPERATURE: 98.7 F | RESPIRATION RATE: 18 BRPM | BODY MASS INDEX: 27.26 KG/M2 | DIASTOLIC BLOOD PRESSURE: 76 MMHG | WEIGHT: 169.6 LBS

## 2023-05-26 DIAGNOSIS — E83.42 HYPOMAGNESEMIA: ICD-10-CM

## 2023-05-26 DIAGNOSIS — I25.83 CORONARY ATHEROSCLEROSIS DUE TO LIPID RICH PLAQUE: ICD-10-CM

## 2023-05-26 DIAGNOSIS — M85.859 OSTEOPENIA OF HIP, UNSPECIFIED LATERALITY: ICD-10-CM

## 2023-05-26 DIAGNOSIS — E61.1 IRON DEFICIENCY: ICD-10-CM

## 2023-05-26 DIAGNOSIS — R06.02 SHORTNESS OF BREATH: ICD-10-CM

## 2023-05-26 DIAGNOSIS — Z78.0 POSTMENOPAUSAL: ICD-10-CM

## 2023-05-26 DIAGNOSIS — I34.0 MITRAL VALVE INSUFFICIENCY, UNSPECIFIED ETIOLOGY: ICD-10-CM

## 2023-05-26 DIAGNOSIS — R53.83 OTHER FATIGUE: ICD-10-CM

## 2023-05-26 DIAGNOSIS — E78.2 MIXED HYPERLIPIDEMIA: Primary | ICD-10-CM

## 2023-05-26 DIAGNOSIS — I25.10 CORONARY ATHEROSCLEROSIS DUE TO LIPID RICH PLAQUE: ICD-10-CM

## 2023-05-26 DIAGNOSIS — E55.9 VITAMIN D DEFICIENCY: ICD-10-CM

## 2023-05-26 PROCEDURE — 1159F MED LIST DOCD IN RCRD: CPT

## 2023-05-26 PROCEDURE — 1160F RVW MEDS BY RX/DR IN RCRD: CPT

## 2023-05-26 PROCEDURE — 99214 OFFICE O/P EST MOD 30 MIN: CPT

## 2023-05-26 RX ORDER — EZETIMIBE 10 MG/1
10 TABLET ORAL DAILY
Qty: 30 TABLET | Refills: 2 | Status: SHIPPED | OUTPATIENT
Start: 2023-05-26 | End: 2023-05-26

## 2023-05-26 RX ORDER — EZETIMIBE 10 MG/1
10 TABLET ORAL DAILY
Qty: 90 TABLET | Refills: 2 | Status: SHIPPED | OUTPATIENT
Start: 2023-05-26

## 2023-05-26 NOTE — PROGRESS NOTES
Chief Complaint  Follow-up (Pt states that she is being seen for a follow up and to go over lab work. Pt states that she is wanting to come see Sofy more frequently she states about every 3 months. ) and Fatigue (Pt states that she is still feeling fatigue after having covid.)    History of Present Illness  SUBJECTIVE  Maryam Sandoval presents to [facility] follow up on chronic disease management.    Patient states she did just get over COVID and she is very fatigued. She states she used to be able to be way more active but she does not have any stamina. Patient states that she was diagnosed with COVID on 4/22/2023    She does have a chronic cough, mild shortness of breath.     Echo 2019-mitral annular calcification with mild mitral regurgitation, tricuspid regurgitation with mild pulm hypertension  CT Chest 2023-mitral annulus calcification, mild coronary artery calcifications    Past Medical History:   Diagnosis Date   • Acne rosacea 11/2/2021   • Arthritis    • Asthma 11/2/2021    Asthma episode with extreme mold exposure    • Bronchospasm 11/2/2021   • Diverticulitis 11/2/2021 8/2005. On CT scan    • Flu    • Histoplasmosis 11/2/2021 1977   • Hyperlipidemia 11/2/2021   • Ovarian cyst 11/2/2021 1993      Family History   Problem Relation Age of Onset   • Malig Hyperthermia Neg Hx       Past Surgical History:   Procedure Laterality Date   • APPENDECTOMY     • BLEPHAROPLASTY Bilateral 12/19/2017    Procedure: BILATERAL  BLEPHAROPLASTY LOWER LID ;  Surgeon: Zachery Fermin MD;  Location: [facility] [place];  Service:    • COLON RESECTION     • ENTROPIAN REPAIR Bilateral 12/19/2017    Procedure: RIGHT LOWER LID ENTROPION REPAIR, LEFT LOWER LID ECTROPION REPAIR;  Surgeon: Zachery Fermin MD;  Location: [facility] [place];  Service:    • HERNIA REPAIR     • JOINT REPLACEMENT     • KNEE ARTHROPLASTY Right    • LAPAROSCOPIC CHOLECYSTECTOMY          Current  "Outpatient Medications:   •  ezetimibe (Zetia) 10 MG tablet, Take 1 tablet by mouth Daily., Disp: 90 tablet, Rfl: 2  •  montelukast (SINGULAIR) 10 MG tablet, Take 1 tablet by mouth Daily., Disp: 90 tablet, Rfl: 3  •  simvastatin (ZOCOR) 40 MG tablet, Take 1 tablet by mouth every night at bedtime., Disp: 90 tablet, Rfl: 3  •  vitamin D (ERGOCALCIFEROL) 1.25 MG (14098 UT) capsule capsule, Take 1 capsule by mouth Every 7 (Seven) Days., Disp: 12 capsule, Rfl: 3  •  Fluticasone-Salmeterol (ADVAIR/WIXELA) 100-50 MCG/ACT DISKUS, Inhale 1 puff 2 (Two) Times a Day. (Patient not taking: Reported on 5/26/2023), Disp: 180 each, Rfl: 0    OBJECTIVE  Vital Signs:   /76 (BP Location: Right arm)   Temp 98.7 °F (37.1 °C) (Temporal)   Resp 18   Ht 167.6 cm (65.98\")   Wt 76.9 kg (169 lb 9.6 oz)   SpO2 97%   BMI 27.39 kg/m²    Estimated body mass index is 27.39 kg/m² as calculated from the following:    Height as of this encounter: 167.6 cm (65.98\").    Weight as of this encounter: 76.9 kg (169 lb 9.6 oz).     Wt Readings from Last 3 Encounters:   05/26/23 76.9 kg (169 lb 9.6 oz)   01/05/23 77.1 kg (170 lb)   12/22/22 79.1 kg (174 lb 6.4 oz)     BP Readings from Last 3 Encounters:   05/26/23 115/76   01/05/23 123/72   12/22/22 116/68       Physical Exam  Vitals and nursing note reviewed.   Constitutional:       Appearance: Normal appearance.   HENT:      Head: Normocephalic.   Eyes:      Extraocular Movements: Extraocular movements intact.      Conjunctiva/sclera: Conjunctivae normal.   Cardiovascular:      Rate and Rhythm: Normal rate and regular rhythm.      Heart sounds: Murmur (faint) heard.   Pulmonary:      Effort: Pulmonary effort is normal.      Breath sounds: Normal breath sounds. No wheezing or rales.   Abdominal:      General: Bowel sounds are normal.      Palpations: Abdomen is soft.      Tenderness: There is no abdominal tenderness. There is no guarding.   Musculoskeletal:         General: No swelling. Normal " range of motion.      Right lower leg: No edema.      Left lower leg: No edema.   Skin:     General: Skin is warm and dry.   Neurological:      General: No focal deficit present.      Mental Status: She is alert and oriented to person, place, and time. Mental status is at baseline.   Psychiatric:         Mood and Affect: Mood normal.         Behavior: Behavior normal.         Thought Content: Thought content normal.         Judgment: Judgment normal.          Result Review    CMP        11/3/2022    09:43 5/16/2023    10:07   CMP   Glucose 86   98     BUN 16   14     Creatinine 0.78   0.88     EGFR 78.8   68.2     Sodium 141   141     Potassium 4.2   4.4     Chloride 103   106     Calcium 10.3   9.9     Total Protein 6.9   7.0     Albumin 4.80   4.5     Globulin 2.1   2.5     Total Bilirubin 0.6   0.5     Alkaline Phosphatase 143   161     AST (SGOT) 23   25     ALT (SGPT) 23   23     Albumin/Globulin Ratio 2.3   1.8     BUN/Creatinine Ratio 20.5   15.9     Anion Gap 12.0   9.0       CBC w/diff        5/16/2023    10:07   CBC w/Diff   WBC 7.58     RBC 4.67     Hemoglobin 15.2     Hematocrit 42.6     MCV 91.2     MCH 32.5     MCHC 35.7     RDW 12.0     Platelets 213     Neutrophil Rel % 54.2     Immature Granulocyte Rel % 0.4     Lymphocyte Rel % 31.5     Monocyte Rel % 10.4     Eosinophil Rel % 2.6     Basophil Rel % 0.9       Lipid Panel        11/3/2022    09:43 5/16/2023    10:07   Lipid Panel   Total Cholesterol 167   152     Triglycerides 172   216     HDL Cholesterol 46   34     VLDL Cholesterol 30   36     LDL Cholesterol  91   82     LDL/HDL Ratio 1.88   2.20       TSH        11/3/2022    09:43   TSH   TSH 1.270           Lab Results   Component Value Date    BNXT07VC 37.4 05/16/2023     Lab Results   Component Value Date    FREET4 1.08 03/03/2022     Lab Results   Component Value Date    CVOUDFAN66 710 05/16/2023     Lab Results   Component Value Date    RBCUA 0-2 10/13/2014    BACTERIA 1+ 10/13/2014     HYALCASTU Occ 10/13/2014    COLORU YELLOW 07/10/2022    CLARITYU SLCLOUDY 07/10/2022    PHUR 5.5 07/10/2022    SPECGRAVUR >=1.030 07/10/2022    GLUCOSEU NEGATIVE 07/10/2022    KETONESU NEGATIVE 07/10/2022    BILIRUBINUR NEGATIVE 07/10/2022    BLOODU TRACE (A) 07/10/2022    PROTEINUA NEGATIVE 07/10/2022    LEUKOCYTESUR NEGATIVE 07/10/2022    NITRITEU NEGATIVE 07/10/2022    UROBILINOGEN 0.2 07/10/2022       CT Chest Without Contrast Diagnostic    Result Date: 1/28/2023    1. No acute cardiopulmonary disease     Samy Baird M.D.       Electronically Signed and Approved By: Samy Baird M.D. on 1/28/2023 at 8:48                The above data has been reviewed by CHEN Mcintosh 05/26/2023 11:42 EDT.          Patient Care Team:  Sofy Branch APRN as PCP - General (Internal Medicine)           ASSESSMENT & PLAN    Diagnoses and all orders for this visit:    1. Mixed hyperlipidemia (Primary)  Assessment & Plan:  Patient is currently on simvastatin.  Triglycerides are a little high  Pt does have mild coronary artery calcifications.  Will add in zetia.     Orders:  -     Comprehensive Metabolic Panel; Future  -     CBC & Differential; Future  -     Iron Profile; Future  -     Lipid Panel; Future  -     TSH+Free T4; Future    2. Osteopenia of hip, unspecified laterality  Assessment & Plan:  DEXA ordered      3. Coronary atherosclerosis due to lipid rich plaque  Assessment & Plan:  CT chest 2023-mitral annulus calcification, mild coronary artery calcifications.  Patient does have a chronic cough and mild SOA. Denies any chest pain.  Will get echo, stress test, ref to cardiology    Orders:  -     Ambulatory Referral to Cardiology  -     Stress test with myocardial perfusion; Future  -     Adult Transthoracic Echo Complete W/ Cont if Necessary Per Protocol; Future    4. Vitamin D deficiency  -     Vitamin D,25-Hydroxy; Future    5. Hypomagnesemia    6. Mitral valve insufficiency, unspecified etiology  -     Ambulatory  Referral to Cardiology  -     Stress test with myocardial perfusion; Future  -     Adult Transthoracic Echo Complete W/ Cont if Necessary Per Protocol; Future    7. Iron deficiency  -     Iron Profile; Future  -     Ferritin; Future    8. Shortness of breath  -     Ambulatory Referral to Cardiology  -     Stress test with myocardial perfusion; Future  -     proBNP; Future    9. Other fatigue  -     Vitamin B12 & Folate; Future  -     Comprehensive Metabolic Panel; Future  -     CBC & Differential; Future  -     Iron Profile; Future  -     TSH+Free T4; Future  -     Magnesium; Future    10. Postmenopausal  -     DEXA Bone Density Axial    Other orders  -     Discontinue: ezetimibe (Zetia) 10 MG tablet; Take 1 tablet by mouth Daily.  Dispense: 30 tablet; Refill: 2  -     ezetimibe (Zetia) 10 MG tablet; Take 1 tablet by mouth Daily.  Dispense: 90 tablet; Refill: 2       Tobacco Use: Low Risk    • Smoking Tobacco Use: Never   • Smokeless Tobacco Use: Never   • Passive Exposure: Not on file       Follow Up     Return in about 3 months (around 8/26/2023).    Please note that portions of this note were completed with a voice recognition program.    Patient was given instructions and counseling regarding her condition or for health maintenance advice. Please see specific information pulled into the AVS if appropriate.   I have reviewed information obtained and documented by others and I have confirmed the accuracy of this documented note.    CHEN Mcintosh

## 2023-05-26 NOTE — ASSESSMENT & PLAN NOTE
Patient is currently on simvastatin.  Triglycerides are a little high  Pt does have mild coronary artery calcifications.  Will add in zetia.

## 2023-05-26 NOTE — ASSESSMENT & PLAN NOTE
CT chest 2023-mitral annulus calcification, mild coronary artery calcifications.  Patient does have a chronic cough and mild SOA. Denies any chest pain.  Will get echo, stress test, ref to cardiology

## 2023-07-24 ENCOUNTER — HOSPITAL ENCOUNTER (OUTPATIENT)
Dept: NUCLEAR MEDICINE | Facility: HOSPITAL | Age: 78
Discharge: HOME | End: 2023-07-24
Payer: MEDICARE

## 2023-07-24 DIAGNOSIS — R06.02 SHORTNESS OF BREATH: ICD-10-CM

## 2023-07-24 DIAGNOSIS — I34.0 MITRAL VALVE INSUFFICIENCY, UNSPECIFIED ETIOLOGY: ICD-10-CM

## 2023-07-24 DIAGNOSIS — I25.10 CORONARY ATHEROSCLEROSIS DUE TO LIPID RICH PLAQUE: ICD-10-CM

## 2023-07-24 DIAGNOSIS — I25.83 CORONARY ATHEROSCLEROSIS DUE TO LIPID RICH PLAQUE: ICD-10-CM

## 2023-07-24 LAB
BH CV IMMEDIATE POST TECH DATA BLOOD PRESSURE: NORMAL MMHG
BH CV IMMEDIATE POST TECH DATA HEART RATE: 99 BPM
BH CV IMMEDIATE POST TECH DATA OXYGEN SATS: 95 %
BH CV REST NUCLEAR ISOTOPE DOSE: 8.4 MCI
BH CV SIX MINUTE RECOVERY TECH DATA BLOOD PRESSURE: NORMAL
BH CV SIX MINUTE RECOVERY TECH DATA HEART RATE: 79 BPM
BH CV SIX MINUTE RECOVERY TECH DATA OXYGEN SATURATION: 95 %
BH CV STRESS BP STAGE 1: NORMAL
BH CV STRESS BP STAGE 2: NORMAL
BH CV STRESS COMMENTS STAGE 1: NORMAL
BH CV STRESS COMMENTS STAGE 2: NORMAL
BH CV STRESS DOSE REGADENOSON STAGE 1: 0.4
BH CV STRESS DURATION MIN STAGE 1: 0
BH CV STRESS DURATION MIN STAGE 2: 4
BH CV STRESS DURATION SEC STAGE 1: 10
BH CV STRESS DURATION SEC STAGE 2: 0
BH CV STRESS GRADE STAGE 1: 10
BH CV STRESS HR STAGE 1: 105
BH CV STRESS HR STAGE 2: 115
BH CV STRESS METS STAGE 1: 5
BH CV STRESS NUCLEAR ISOTOPE DOSE: 30.4 MCI
BH CV STRESS O2 STAGE 1: 95
BH CV STRESS O2 STAGE 2: 95
BH CV STRESS PROTOCOL 1: NORMAL
BH CV STRESS PROTOCOL 2: NORMAL
BH CV STRESS SPEED STAGE 1: 1.7
BH CV STRESS STAGE 1: 1
BH CV STRESS STAGE 2: 2
BH CV THREE MINUTE POST TECH DATA BLOOD PRESSURE: NORMAL MMHG
BH CV THREE MINUTE POST TECH DATA HEART RATE: 82 BPM
BH CV THREE MINUTE POST TECH DATA OXYGEN SATURATION: 95 %
LV EF NUC BP: 74 %
MAXIMAL PREDICTED HEART RATE: 144 BPM
STRESS BASELINE BP: NORMAL MMHG
STRESS BASELINE HR: 54 BPM
STRESS O2 SAT REST: 95 %
STRESS POST ESTIMATED WORKLOAD: 4 METS
STRESS POST EXERCISE DUR MIN: 7 MIN
STRESS POST EXERCISE DUR SEC: 53 SEC
STRESS TARGET HR: 122 BPM

## 2023-07-24 PROCEDURE — 78452 HT MUSCLE IMAGE SPECT MULT: CPT | Performed by: INTERNAL MEDICINE

## 2023-07-24 PROCEDURE — A9502 TC99M TETROFOSMIN: HCPCS

## 2023-07-24 PROCEDURE — 0 TECHNETIUM TETROFOSMIN KIT

## 2023-07-24 PROCEDURE — 93016 CV STRESS TEST SUPVJ ONLY: CPT | Performed by: NURSE PRACTITIONER

## 2023-07-24 PROCEDURE — 25010000002 REGADENOSON 0.4 MG/5ML SOLUTION

## 2023-07-24 PROCEDURE — 93018 CV STRESS TEST I&R ONLY: CPT | Performed by: INTERNAL MEDICINE

## 2023-07-24 PROCEDURE — 78452 HT MUSCLE IMAGE SPECT MULT: CPT

## 2023-07-24 PROCEDURE — 93017 CV STRESS TEST TRACING ONLY: CPT

## 2023-07-24 RX ORDER — REGADENOSON 0.08 MG/ML
0.4 INJECTION, SOLUTION INTRAVENOUS
Status: COMPLETED | OUTPATIENT
Start: 2023-07-24 | End: 2023-07-24

## 2023-07-24 RX ADMIN — TETROFOSMIN 1 DOSE: 1.38 INJECTION, POWDER, LYOPHILIZED, FOR SOLUTION INTRAVENOUS at 09:50

## 2023-07-24 RX ADMIN — TETROFOSMIN 1 DOSE: 1.38 INJECTION, POWDER, LYOPHILIZED, FOR SOLUTION INTRAVENOUS at 12:05

## 2023-07-24 RX ADMIN — REGADENOSON 0.4 MG: 0.08 INJECTION, SOLUTION INTRAVENOUS at 12:05

## 2023-07-26 ENCOUNTER — TELEPHONE (OUTPATIENT)
Dept: INTERNAL MEDICINE | Facility: CLINIC | Age: 78
End: 2023-07-26

## 2023-07-26 NOTE — TELEPHONE ENCOUNTER
Caller: Maryam Sandoval    Relationship: Self    Best call back number: [phone]    What is the best time to reach you: ANY    Who are you requesting to speak with (clinical staff, provider,  specific staff member): CLINICAL       What was the call regarding: PATIENT STATED SHE GOT A CALL YESTERDAY AND THEY TOLD HER STRESS CAME BACK FINE. PATIENT WOULD LIKE TO KNOW IF SHE IS NEEDLING TO STILL SEE THE CARDIOLOGIST AND IF SO WHAT IS THE PROVIDER NAME.     PLEASE ADVISE

## 2023-08-14 ENCOUNTER — OFFICE VISIT (OUTPATIENT)
Dept: CARDIOLOGY | Facility: CLINIC | Age: 78
End: 2023-08-14
Payer: MEDICARE

## 2023-08-14 ENCOUNTER — LAB (OUTPATIENT)
Dept: INTERNAL MEDICINE | Facility: CLINIC | Age: 78
End: 2023-08-14
Payer: MEDICARE

## 2023-08-14 VITALS
SYSTOLIC BLOOD PRESSURE: 119 MMHG | BODY MASS INDEX: 27.48 KG/M2 | DIASTOLIC BLOOD PRESSURE: 57 MMHG | HEIGHT: 66 IN | HEART RATE: 77 BPM | WEIGHT: 171 LBS

## 2023-08-14 DIAGNOSIS — E78.2 HYPERLIPEMIA, MIXED: ICD-10-CM

## 2023-08-14 DIAGNOSIS — E61.1 IRON DEFICIENCY: ICD-10-CM

## 2023-08-14 DIAGNOSIS — R53.83 OTHER FATIGUE: ICD-10-CM

## 2023-08-14 DIAGNOSIS — E55.9 VITAMIN D DEFICIENCY: ICD-10-CM

## 2023-08-14 DIAGNOSIS — E78.2 MIXED HYPERLIPIDEMIA: ICD-10-CM

## 2023-08-14 DIAGNOSIS — R06.02 SHORTNESS OF BREATH: ICD-10-CM

## 2023-08-14 DIAGNOSIS — I25.10 CORONARY ATHEROSCLEROSIS DUE TO LIPID RICH PLAQUE: Primary | ICD-10-CM

## 2023-08-14 DIAGNOSIS — I34.0 MITRAL VALVE INSUFFICIENCY, UNSPECIFIED ETIOLOGY: ICD-10-CM

## 2023-08-14 DIAGNOSIS — I25.83 CORONARY ATHEROSCLEROSIS DUE TO LIPID RICH PLAQUE: Primary | ICD-10-CM

## 2023-08-14 LAB
25(OH)D3 SERPL-MCNC: 39.2 NG/ML (ref 30–100)
ALBUMIN SERPL-MCNC: 4.8 G/DL (ref 3.5–5.2)
ALBUMIN/GLOB SERPL: 2.4 G/DL
ALP SERPL-CCNC: 174 U/L (ref 39–117)
ALT SERPL W P-5'-P-CCNC: 26 U/L (ref 1–33)
ANION GAP SERPL CALCULATED.3IONS-SCNC: 8 MMOL/L (ref 5–15)
AST SERPL-CCNC: 24 U/L (ref 1–32)
BASOPHILS # BLD AUTO: 0.05 10*3/MM3 (ref 0–0.2)
BASOPHILS NFR BLD AUTO: 0.6 % (ref 0–1.5)
BILIRUB SERPL-MCNC: 0.4 MG/DL (ref 0–1.2)
BUN SERPL-MCNC: 16 MG/DL (ref 8–23)
BUN/CREAT SERPL: 22.9 (ref 7–25)
CALCIUM SPEC-SCNC: 10.3 MG/DL (ref 8.6–10.5)
CHLORIDE SERPL-SCNC: 106 MMOL/L (ref 98–107)
CHOLEST SERPL-MCNC: 128 MG/DL (ref 0–200)
CO2 SERPL-SCNC: 28 MMOL/L (ref 22–29)
CREAT SERPL-MCNC: 0.7 MG/DL (ref 0.57–1)
DEPRECATED RDW RBC AUTO: 40.9 FL (ref 37–54)
EGFRCR SERPLBLD CKD-EPI 2021: 89.8 ML/MIN/1.73
EOSINOPHIL # BLD AUTO: 0.14 10*3/MM3 (ref 0–0.4)
EOSINOPHIL NFR BLD AUTO: 1.7 % (ref 0.3–6.2)
ERYTHROCYTE [DISTWIDTH] IN BLOOD BY AUTOMATED COUNT: 12 % (ref 12.3–15.4)
FERRITIN SERPL-MCNC: 419 NG/ML (ref 13–150)
FOLATE SERPL-MCNC: >20 NG/ML (ref 4.78–24.2)
GLOBULIN UR ELPH-MCNC: 2 GM/DL
GLUCOSE SERPL-MCNC: 89 MG/DL (ref 65–99)
HCT VFR BLD AUTO: 43.4 % (ref 34–46.6)
HDLC SERPL-MCNC: 38 MG/DL (ref 40–60)
HGB BLD-MCNC: 14.7 G/DL (ref 12–15.9)
IMM GRANULOCYTES # BLD AUTO: 0.03 10*3/MM3 (ref 0–0.05)
IMM GRANULOCYTES NFR BLD AUTO: 0.4 % (ref 0–0.5)
IRON 24H UR-MRATE: 91 MCG/DL (ref 37–145)
IRON SATN MFR SERPL: 25 % (ref 20–50)
LDLC SERPL CALC-MCNC: 58 MG/DL (ref 0–100)
LDLC/HDLC SERPL: 1.34 {RATIO}
LYMPHOCYTES # BLD AUTO: 2.52 10*3/MM3 (ref 0.7–3.1)
LYMPHOCYTES NFR BLD AUTO: 31.2 % (ref 19.6–45.3)
MAGNESIUM SERPL-MCNC: 2.5 MG/DL (ref 1.6–2.4)
MCH RBC QN AUTO: 31.4 PG (ref 26.6–33)
MCHC RBC AUTO-ENTMCNC: 33.9 G/DL (ref 31.5–35.7)
MCV RBC AUTO: 92.7 FL (ref 79–97)
MONOCYTES # BLD AUTO: 0.83 10*3/MM3 (ref 0.1–0.9)
MONOCYTES NFR BLD AUTO: 10.3 % (ref 5–12)
NEUTROPHILS NFR BLD AUTO: 4.5 10*3/MM3 (ref 1.7–7)
NEUTROPHILS NFR BLD AUTO: 55.8 % (ref 42.7–76)
NRBC BLD AUTO-RTO: 0 /100 WBC (ref 0–0.2)
NT-PROBNP SERPL-MCNC: 136 PG/ML (ref 0–1800)
PLATELET # BLD AUTO: 206 10*3/MM3 (ref 140–450)
PMV BLD AUTO: 8.8 FL (ref 6–12)
POTASSIUM SERPL-SCNC: 4.4 MMOL/L (ref 3.5–5.2)
PROT SERPL-MCNC: 6.8 G/DL (ref 6–8.5)
RBC # BLD AUTO: 4.68 10*6/MM3 (ref 3.77–5.28)
SODIUM SERPL-SCNC: 142 MMOL/L (ref 136–145)
T4 FREE SERPL-MCNC: 0.99 NG/DL (ref 0.93–1.7)
TIBC SERPL-MCNC: 368 MCG/DL (ref 298–536)
TRANSFERRIN SERPL-MCNC: 247 MG/DL (ref 200–360)
TRIGL SERPL-MCNC: 196 MG/DL (ref 0–150)
TSH SERPL DL<=0.05 MIU/L-ACNC: 1.62 UIU/ML (ref 0.27–4.2)
VIT B12 BLD-MCNC: 860 PG/ML (ref 211–946)
VLDLC SERPL-MCNC: 32 MG/DL (ref 5–40)
WBC NRBC COR # BLD: 8.07 10*3/MM3 (ref 3.4–10.8)

## 2023-08-14 PROCEDURE — 84439 ASSAY OF FREE THYROXINE: CPT

## 2023-08-14 PROCEDURE — 80053 COMPREHEN METABOLIC PANEL: CPT

## 2023-08-14 PROCEDURE — 82746 ASSAY OF FOLIC ACID SERUM: CPT

## 2023-08-14 PROCEDURE — 83540 ASSAY OF IRON: CPT

## 2023-08-14 PROCEDURE — 83735 ASSAY OF MAGNESIUM: CPT

## 2023-08-14 PROCEDURE — 36415 COLL VENOUS BLD VENIPUNCTURE: CPT

## 2023-08-14 PROCEDURE — 83880 ASSAY OF NATRIURETIC PEPTIDE: CPT

## 2023-08-14 PROCEDURE — 84466 ASSAY OF TRANSFERRIN: CPT

## 2023-08-14 PROCEDURE — 82607 VITAMIN B-12: CPT

## 2023-08-14 PROCEDURE — 84443 ASSAY THYROID STIM HORMONE: CPT

## 2023-08-14 PROCEDURE — 1160F RVW MEDS BY RX/DR IN RCRD: CPT | Performed by: INTERNAL MEDICINE

## 2023-08-14 PROCEDURE — 85025 COMPLETE CBC W/AUTO DIFF WBC: CPT

## 2023-08-14 PROCEDURE — 99204 OFFICE O/P NEW MOD 45 MIN: CPT | Performed by: INTERNAL MEDICINE

## 2023-08-14 PROCEDURE — 80061 LIPID PANEL: CPT

## 2023-08-14 PROCEDURE — 1159F MED LIST DOCD IN RCRD: CPT | Performed by: INTERNAL MEDICINE

## 2023-08-14 PROCEDURE — 82728 ASSAY OF FERRITIN: CPT

## 2023-08-14 PROCEDURE — 82306 VITAMIN D 25 HYDROXY: CPT

## 2023-08-14 RX ORDER — ALBUTEROL SULFATE 90 UG/1
AEROSOL, METERED RESPIRATORY (INHALATION)
COMMUNITY

## 2023-08-14 RX ORDER — ASPIRIN 81 MG/1
81 TABLET ORAL DAILY
Qty: 99 TABLET | Refills: 99 | Status: SHIPPED | OUTPATIENT
Start: 2023-08-14

## 2023-08-14 NOTE — PROGRESS NOTES
Chief Complaint  Coronary atherosclerosis; Shortness of Breath; and  Mitral valve insufficiency, unspecified etiology    Subjective            Maryam Sandoval presents to [facility] CARDIOLOGY  Shortness of Breath  Pertinent negatives include no chest pain.     77-year-old white female.  She has no significant cardiac problems in the past.  She has had some shortness of breath.  Through primary care she had a CT chest earlier this year that showed coronary calcifications and mitral annular calcification.  She underwent stress imaging last month that showed normal perfusion.  Her echo showed thickening of the mitral valve with mild mitral and tricuspid regurgitation.  She was referred to cardiology.  She has dyslipidemia.  She is a non-smoker.  She denies chest pain.  Her shortness of breath is mild and chronic.    PMH  Past Medical History:   Diagnosis Date    Acne rosacea 11/2/2021    Arthritis     Asthma 11/2/2021    Asthma episode with extreme mold exposure     Bronchospasm 11/2/2021    Diverticulitis 11/2/2021 8/2005. 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 1977    Hyperlipidemia 11/2/2021    Ovarian cyst 11/2/2021    1993         SURGICALHX  Past Surgical History:   Procedure Laterality Date    APPENDECTOMY      BLEPHAROPLASTY Bilateral 12/19/2017    Procedure: BILATERAL  BLEPHAROPLASTY LOWER LID ;  Surgeon: Zachery Fermin MD;  [place] [facility] [place] [facility] [place]:     COLON RESECTION      ENTROPIAN REPAIR Bilateral 12/19/2017    Procedure: RIGHT LOWER LID ENTROPION REPAIR, LEFT LOWER LID ECTROPION REPAIR;  Surgeon: Zachery Fermin MD;  Location: [facility];  Service:     HERNIA REPAIR      JOINT REPLACEMENT      KNEE ARTHROPLASTY Right     LAPAROSCOPIC CHOLECYSTECTOMY          SOC  Social History     Socioeconomic History    Marital status:    Tobacco Use    Smoking status: Never    Smokeless tobacco: Never   Vaping Use    Vaping Use: Never used  "  Substance and Sexual Activity    Alcohol use: Yes     Comment: RARELY    Drug use: No    Sexual activity: Defer         FAMHX  Family History   Problem Relation Age of Onset    Malig Hyperthermia Neg Hx           ALLERGY  Allergies   Allergen Reactions    Cipro [Ciprofloxacin Hcl] GI Intolerance    Flagyl [Metronidazole] GI Intolerance        MEDSCURRENT    Current Outpatient Medications:     ezetimibe (Zetia) 10 MG tablet, Take 1 tablet by mouth Daily., Disp: 90 tablet, Rfl: 2    montelukast (SINGULAIR) 10 MG tablet, Take 1 tablet by mouth Daily., Disp: 90 tablet, Rfl: 3    simvastatin (ZOCOR) 40 MG tablet, Take 1 tablet by mouth every night at bedtime., Disp: 90 tablet, Rfl: 3    vitamin D (ERGOCALCIFEROL) 1.25 MG (95985 UT) capsule capsule, Take 1 capsule by mouth Every 7 (Seven) Days., Disp: 12 capsule, Rfl: 3    albuterol sulfate  (90 Base) MCG/ACT inhaler, ProAir HFA 90 MCG/ACTUAT Metered Dose Inhaler, 200 ACTUAT         Active, Disp: , Rfl:     aspirin 81 MG EC tablet, Take 1 tablet by mouth Daily., Disp: 99 tablet, Rfl: 99    Fluticasone-Salmeterol (ADVAIR/WIXELA) 100-50 MCG/ACT DISKUS, Inhale 1 puff 2 (Two) Times a Day. (Patient not taking: Reported on 5/26/2023), Disp: 180 each, Rfl: 0      Review of Systems   Constitutional: Negative.   HENT: Negative.     Eyes: Negative.    Cardiovascular:  Positive for dyspnea on exertion. Negative for chest pain.   Respiratory:  Positive for shortness of breath.    Endocrine: Negative.    Hematologic/Lymphatic: Negative.    Skin: Negative.    Musculoskeletal: Negative.    Gastrointestinal: Negative.    Genitourinary: Negative.    Neurological: Negative.    Psychiatric/Behavioral: Negative.        Objective     /57   Pulse 77   Ht 167.6 cm (65.98\")   Wt 77.6 kg (171 lb)   BMI 27.62 kg/mý       General Appearance:   well developed  well nourished  HENT:   oropharynx moist  lips not cyanotic  Neck:  thyroid not enlarged  supple  Respiratory:  no " respiratory distress  normal breath sounds  no rales  Cardiovascular:  no jugular venous distention  regular rhythm  apical impulse normal  S1 normal, S2 normal  no S3, no S4   Soft basal systolic murmur  no rub, no thrill  carotid pulses normal; no bruit  pedal pulses normal  lower extremity edema: none    Musculoskeletal:  no clubbing of fingers.   normocephalic, head atraumatic  Skin:   warm, dry  Psychiatric:  judgement and insight appropriate  normal mood and affect      Result Review :     The following data was reviewed by: Macho Terry MD on 08/14/2023:    CMP          11/3/2022    09:43 5/16/2023    10:07   CMP   Glucose 86  98    BUN 16  14    Creatinine 0.78  0.88    EGFR 78.8  68.2    Sodium 141  141    Potassium 4.2  4.4    Chloride 103  106    Calcium 10.3  9.9    Total Protein 6.9  7.0    Albumin 4.80  4.5    Globulin 2.1  2.5    Total Bilirubin 0.6  0.5    Alkaline Phosphatase 143  161    AST (SGOT) 23  25    ALT (SGPT) 23  23    Albumin/Globulin Ratio 2.3  1.8    BUN/Creatinine Ratio 20.5  15.9    Anion Gap 12.0  9.0      CBC          5/16/2023    10:07   CBC   WBC 7.58    RBC 4.67    Hemoglobin 15.2    Hematocrit 42.6    MCV 91.2    MCH 32.5    MCHC 35.7    RDW 12.0    Platelets 213      Lipid Panel          11/3/2022    09:43 5/16/2023    10:07   Lipid Panel   Total Cholesterol 167  152    Triglycerides 172  216    HDL Cholesterol 46  34    VLDL Cholesterol 30  36    LDL Cholesterol  91  82    LDL/HDL Ratio 1.88  2.20      TSH          11/3/2022    09:43   TSH   TSH 1.270        Data reviewed : CT chest reviewed, stress test and echo reviewed      Procedures             Assessment and Plan        ASSESSMENT:  Encounter Diagnoses   Name Primary?    Coronary atherosclerosis due to lipid rich plaque Yes    Mitral valve insufficiency, unspecified etiology     Hyperlipemia, mixed          PLAN:    1.  Coronary atherosclerosis-noted on CT chest.  Nuclear stress test was negative for  ischemia.  Continue lipid-lowering therapy and I am adding low-dose aspirin daily.  2.  Mitral valve insufficiency-secondary to age-related degenerative changes and mitral annular calcification.  It is mild on the most recent echo and requires no additional treatment at this time.  I would repeat another echo in probably about 2 years for follow-up  3.  Mixed hyperlipidemia-stable on combination therapy, followed by primary care    Follow-up annually unless problems arise          Patient was given instructions and counseling regarding her condition or for health maintenance advice. Please see specific information pulled into the AVS if appropriate.             HUAN Terry MD  8/14/2023    11:09 EDT

## 2023-08-29 ENCOUNTER — OFFICE VISIT (OUTPATIENT)
Dept: INTERNAL MEDICINE | Facility: CLINIC | Age: 78
End: 2023-08-29
Payer: MEDICARE

## 2023-08-29 VITALS
HEIGHT: 66 IN | OXYGEN SATURATION: 96 % | WEIGHT: 166.4 LBS | TEMPERATURE: 97.8 F | HEART RATE: 71 BPM | SYSTOLIC BLOOD PRESSURE: 130 MMHG | DIASTOLIC BLOOD PRESSURE: 60 MMHG | BODY MASS INDEX: 26.74 KG/M2

## 2023-08-29 DIAGNOSIS — J45.20 MILD INTERMITTENT ASTHMA WITHOUT COMPLICATION: ICD-10-CM

## 2023-08-29 DIAGNOSIS — E78.2 MIXED HYPERLIPIDEMIA: ICD-10-CM

## 2023-08-29 DIAGNOSIS — I25.83 CORONARY ATHEROSCLEROSIS DUE TO LIPID RICH PLAQUE: ICD-10-CM

## 2023-08-29 DIAGNOSIS — Z86.16 HISTORY OF COVID-19: ICD-10-CM

## 2023-08-29 DIAGNOSIS — R79.89 ELEVATED FERRITIN: ICD-10-CM

## 2023-08-29 DIAGNOSIS — E83.41 HYPERMAGNESEMIA: ICD-10-CM

## 2023-08-29 DIAGNOSIS — R79.89 ELEVATED LFTS: Primary | ICD-10-CM

## 2023-08-29 DIAGNOSIS — M19.032 PRIMARY OSTEOARTHRITIS OF LEFT WRIST: ICD-10-CM

## 2023-08-29 DIAGNOSIS — E55.9 VITAMIN D DEFICIENCY: ICD-10-CM

## 2023-08-29 DIAGNOSIS — I25.10 CORONARY ATHEROSCLEROSIS DUE TO LIPID RICH PLAQUE: ICD-10-CM

## 2023-08-29 PROCEDURE — 1160F RVW MEDS BY RX/DR IN RCRD: CPT

## 2023-08-29 PROCEDURE — 1159F MED LIST DOCD IN RCRD: CPT

## 2023-08-29 PROCEDURE — 99214 OFFICE O/P EST MOD 30 MIN: CPT

## 2023-08-29 NOTE — ASSESSMENT & PLAN NOTE
Stable on simvastatin  Triglycerides are improving on zetia  She was recently seen and evaluated by cardiology.  Continue current regimen.

## 2023-08-29 NOTE — PROGRESS NOTES
Chief Complaint  Follow-up (4 month follow up wants to go over her tests she had done ), Labs Only, and Hyperlipidemia    Back Pain    SUBJECTIVE  Maryam Sandoval presents to [facility]INE follow up on chronic disease management.    Patient is doing well overall.    She does try to stay active.     She does have occasional soa. She denies any chest pain, palpitations, nausea, vomiting, diarrhea, changes to bowel/bladder dysfunction.     Past Medical History:   Diagnosis Date    Acne rosacea 11/2/2021    Arthritis     Asthma 11/2/2021    Asthma episode with extreme mold exposure     Bronchospasm 11/2/2021    Diverticulitis 11/2/2021 8/2005. 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 1977    Hyperlipidemia 11/2/2021    Ovarian cyst 11/2/2021 1993      Family History   Problem Relation Age of Onset    Malig Hyperthermia Neg Hx       Past Surgical History:   Procedure Laterality Date    APPENDECTOMY      BLEPHAROPLASTY Bilateral 12/19/2017    Procedure: BILATERAL  BLEPHAROPLASTY LOWER LID ;  Surgeon: Zachery Fermin MD;  Location: [facility];  Service:     COLON RESECTION      ENTROPIAN REPAIR Bilateral 12/19/2017    Procedure: RIGHT LOWER LID ENTROPION REPAIR, LEFT LOWER LID ECTROPION REPAIR;  Surgeon: Zachery Fermin MD;  Location: [facility];  Service:     HERNIA REPAIR      JOINT REPLACEMENT      KNEE ARTHROPLASTY Right     LAPAROSCOPIC CHOLECYSTECTOMY          Current Outpatient Medications:     aspirin 81 MG EC tablet, Take 1 tablet by mouth Daily., Disp: 99 tablet, Rfl: 99    ezetimibe (Zetia) 10 MG tablet, Take 1 tablet by mouth Daily., Disp: 90 tablet, Rfl: 2    montelukast (SINGULAIR) 10 MG tablet, Take 1 tablet by mouth Daily., Disp: 90 tablet, Rfl: 3    simvastatin (ZOCOR) 40 MG tablet, Take 1 tablet by mouth every night at bedtime., Disp: 90 tablet, Rfl: 3    vitamin D (ERGOCALCIFEROL) 1.25 MG (49616 UT) capsule capsule, Take 1  "capsule by mouth Every 7 (Seven) Days., Disp: 12 capsule, Rfl: 3    albuterol sulfate  (90 Base) MCG/ACT inhaler, ProAir HFA 90 MCG/ACTUAT Metered Dose Inhaler, 200 ACTUAT         Active, Disp: , Rfl:     Fluticasone-Salmeterol (ADVAIR/WIXELA) 100-50 MCG/ACT DISKUS, Inhale 1 puff 2 (Two) Times a Day. (Patient not taking: Reported on 5/26/2023), Disp: 180 each, Rfl: 0    OBJECTIVE  Vital Signs:   /60 (BP Location: Left arm, Patient Position: Sitting, Cuff Size: Adult)   Pulse 71   Temp 97.8 øF (36.6 øC) (Temporal)   Ht 167.6 cm (65.98\")   Wt 75.5 kg (166 lb 6.4 oz)   SpO2 96%   BMI 26.87 kg/mý    Estimated body mass index is 26.87 kg/mý as calculated from the following:    Height as of this encounter: 167.6 cm (65.98\").    Weight as of this encounter: 75.5 kg (166 lb 6.4 oz).     Wt Readings from Last 3 Encounters:   08/29/23 75.5 kg (166 lb 6.4 oz)   08/14/23 77.6 kg (171 lb)   05/26/23 76.9 kg (169 lb 9.6 oz)     BP Readings from Last 3 Encounters:   08/29/23 130/60   08/14/23 119/57   05/26/23 115/76       Physical Exam  Vitals and nursing note reviewed.   Constitutional:       Appearance: Normal appearance.   HENT:      Head: Normocephalic.   Eyes:      Extraocular Movements: Extraocular movements intact.      Conjunctiva/sclera: Conjunctivae normal.   Cardiovascular:      Rate and Rhythm: Normal rate and regular rhythm.      Heart sounds: Normal heart sounds. No murmur heard.  Pulmonary:      Effort: Pulmonary effort is normal.      Breath sounds: Normal breath sounds. No wheezing or rales.   Musculoskeletal:         General: No swelling. Normal range of motion.      Cervical back: Normal range of motion and neck supple.   Skin:     General: Skin is warm and dry.   Neurological:      General: No focal deficit present.      Mental Status: She is alert and oriented to person, place, and time. Mental status is at baseline.   Psychiatric:         Mood and Affect: Mood normal.         Behavior: " Behavior normal.         Thought Content: Thought content normal.         Judgment: Judgment normal.        Result Review    CMP          11/3/2022    09:43 5/16/2023    10:07 8/14/2023    09:47   CMP   Glucose 86  98  89    BUN 16  14  16    Creatinine 0.78  0.88  0.70    EGFR 78.8  68.2  89.8    Sodium 141  141  142    Potassium 4.2  4.4  4.4    Chloride 103  106  106    Calcium 10.3  9.9  10.3    Total Protein 6.9  7.0  6.8    Albumin 4.80  4.5  4.8    Globulin 2.1  2.5  2.0    Total Bilirubin 0.6  0.5  0.4    Alkaline Phosphatase 143  161  174    AST (SGOT) 23  25  24    ALT (SGPT) 23  23  26    Albumin/Globulin Ratio 2.3  1.8  2.4    BUN/Creatinine Ratio 20.5  15.9  22.9    Anion Gap 12.0  9.0  8.0      CBC w/diff          5/16/2023    10:07 8/14/2023    09:47   CBC w/Diff   WBC 7.58  8.07    RBC 4.67  4.68    Hemoglobin 15.2  14.7    Hematocrit 42.6  43.4    MCV 91.2  92.7    MCH 32.5  31.4    MCHC 35.7  33.9    RDW 12.0  12.0    Platelets 213  206    Neutrophil Rel % 54.2  55.8    Immature Granulocyte Rel % 0.4  0.4    Lymphocyte Rel % 31.5  31.2    Monocyte Rel % 10.4  10.3    Eosinophil Rel % 2.6  1.7    Basophil Rel % 0.9  0.6      Lipid Panel          11/3/2022    09:43 5/16/2023    10:07 8/14/2023    09:47   Lipid Panel   Total Cholesterol 167  152  128    Triglycerides 172  216  196    HDL Cholesterol 46  34  38    VLDL Cholesterol 30  36  32    LDL Cholesterol  91  82  58    LDL/HDL Ratio 1.88  2.20  1.34      TSH          11/3/2022    09:43 8/14/2023    09:47   TSH   TSH 1.270  1.620        Lab Results   Component Value Date    EKXZ86DU 39.2 08/14/2023     Lab Results   Component Value Date    NQDSPXJX72 860 08/14/2023     Lab Results   Component Value Date    RBCUA 0-2 10/13/2014    BACTERIA 1+ 10/13/2014    HYALCASTU Occ 10/13/2014    COLORU YELLOW 07/10/2022    CLARITYU SLCLOUDY 07/10/2022    PHUR 5.5 07/10/2022    SPECGRAVUR >=1.030 07/10/2022    GLUCOSEU NEGATIVE 07/10/2022    KETONESU NEGATIVE  07/10/2022    BILIRUBINUR NEGATIVE 07/10/2022    BLOODU TRACE (A) 07/10/2022    PROTEINUA NEGATIVE 07/10/2022    LEUKOCYTESUR NEGATIVE 07/10/2022    NITRITEU NEGATIVE 07/10/2022    UROBILINOGEN 0.2 07/10/2022       No Images in the past 120 days found..     The above data has been reviewed by CHEN Mcintosh 08/29/2023 15:09 EDT.          Patient Care Team:  Sofy Branch APRN as PCP - General (Internal Medicine)            ASSESSMENT & PLAN    Diagnoses and all orders for this visit:    1. Elevated LFTs (Primary)  -     Alkaline Phosphatase, Isoenzymes; Future  -     Comprehensive metabolic panel; Future  -     CBC & Differential; Future  -     Comprehensive Metabolic Panel; Future  -     Lipid Panel; Future  -     Urinalysis With Microscopic - Urine, Clean Catch; Future  -     TSH Rfx On Abnormal To Free T4; Future  -     Vitamin B12 & Folate; Future    2. Coronary atherosclerosis due to lipid rich plaque  Assessment & Plan:  History of coronary artery calcifications. Echocardiogram was reviewed with patient.  Stress test negative  She has been seen by cardiologist.  Continue current regimen, including low dose ASA      3. Mixed hyperlipidemia  Assessment & Plan:  Stable on simvastatin  Triglycerides are improving on zetia  She was recently seen and evaluated by cardiology.  Continue current regimen.    Orders:  -     CBC & Differential; Future  -     Comprehensive Metabolic Panel; Future  -     Lipid Panel; Future  -     Urinalysis With Microscopic - Urine, Clean Catch; Future  -     TSH Rfx On Abnormal To Free T4; Future  -     Vitamin B12 & Folate; Future    4. Hypermagnesemia  Assessment & Plan:  Mildly elevated.  She will take her mag every other day.  Recheck in 4 weeks.    Orders:  -     Magnesium; Future  -     Magnesium; Future    5. Mild intermittent asthma without complication  Overview:  Asthma episode with extreme mold exposure     Assessment & Plan:  Asthma and allergies are well controlled.  She  is currently on singulair.  She is not needing her advair or albuterol        6. Vitamin D deficiency  Assessment & Plan:  Adequate with supplementation    Orders:  -     Vitamin D,25-Hydroxy; Future    7. Primary osteoarthritis of left wrist  Assessment & Plan:  She does have occasional pain  She will try voltaren gel PRN.  May take occasional NSAID PRN.      8. History of COVID-19  -     SARS-CoV-2 Antibodies, Nucleocapsid (Natural Immunity); Future    9. Elevated ferritin  -     Hemochromatosis Mutation; Future  -     Iron Profile; Future  -     Ferritin; Future         Tobacco Use: Low Risk     Smoking Tobacco Use: Never    Smokeless Tobacco Use: Never    Passive Exposure: Not on file       Follow Up     Return in about 4 months (around 12/29/2023).    Please note that portions of this note were completed with a voice recognition program.    Patient was given instructions and counseling regarding her condition or for health maintenance advice. Please see specific information pulled into the AVS if appropriate.   I have reviewed information obtained and documented by others and I have confirmed the accuracy of this documented note.    CHEN Mcintosh

## 2023-08-29 NOTE — ASSESSMENT & PLAN NOTE
History of coronary artery calcifications. Echocardiogram was reviewed with patient.  Stress test negative  She has been seen by cardiologist.  Continue current regimen, including low dose ASA

## 2023-08-29 NOTE — ASSESSMENT & PLAN NOTE
Asthma and allergies are well controlled.  She is currently on singulair.  She is not needing her advair or albuterol

## 2023-11-17 RX ORDER — EZETIMIBE 10 MG/1
10 TABLET ORAL DAILY
Qty: 90 TABLET | Refills: 2 | Status: SHIPPED | OUTPATIENT
Start: 2023-11-17

## 2023-11-17 NOTE — TELEPHONE ENCOUNTER
Rx Refill Note  Requested Prescriptions      No prescriptions requested or ordered in this encounter      Last office visit with prescribing clinician: 8/29/2023   Last telemedicine visit with prescribing clinician: Visit date not found   Next office visit with prescribing clinician: 11/30/2023                         Would you like a call back once the refill request has been completed: [] Yes [] No    If the office needs to give you a call back, can they leave a voicemail: [] Yes [] No    Susy Alves MA  11/17/23, 15:18 EST

## 2023-11-20 ENCOUNTER — LAB (OUTPATIENT)
Dept: LAB | Facility: HOSPITAL | Age: 78
End: 2023-11-20
Payer: MEDICARE

## 2023-11-20 DIAGNOSIS — E78.2 MIXED HYPERLIPIDEMIA: ICD-10-CM

## 2023-11-20 DIAGNOSIS — R79.89 ELEVATED FERRITIN: ICD-10-CM

## 2023-11-20 DIAGNOSIS — Z86.16 HISTORY OF COVID-19: ICD-10-CM

## 2023-11-20 DIAGNOSIS — E83.41 HYPERMAGNESEMIA: ICD-10-CM

## 2023-11-20 DIAGNOSIS — E55.9 VITAMIN D DEFICIENCY: ICD-10-CM

## 2023-11-20 DIAGNOSIS — R79.89 ELEVATED LFTS: ICD-10-CM

## 2023-11-20 LAB
25(OH)D3 SERPL-MCNC: 39.4 NG/ML (ref 30–100)
ALBUMIN SERPL-MCNC: 4.9 G/DL (ref 3.5–5.2)
ALBUMIN/GLOB SERPL: 2.5 G/DL
ALP SERPL-CCNC: 165 U/L (ref 39–117)
ALT SERPL W P-5'-P-CCNC: 27 U/L (ref 1–33)
ANION GAP SERPL CALCULATED.3IONS-SCNC: 9 MMOL/L (ref 5–15)
AST SERPL-CCNC: 23 U/L (ref 1–32)
BACTERIA UR QL AUTO: NORMAL /HPF
BASOPHILS # BLD AUTO: 0.06 10*3/MM3 (ref 0–0.2)
BASOPHILS NFR BLD AUTO: 0.7 % (ref 0–1.5)
BILIRUB SERPL-MCNC: 0.6 MG/DL (ref 0–1.2)
BILIRUB UR QL STRIP: NEGATIVE
BUN SERPL-MCNC: 17 MG/DL (ref 8–23)
BUN/CREAT SERPL: 20 (ref 7–25)
CALCIUM SPEC-SCNC: 10.4 MG/DL (ref 8.6–10.5)
CHLORIDE SERPL-SCNC: 104 MMOL/L (ref 98–107)
CHOLEST SERPL-MCNC: 128 MG/DL (ref 0–200)
CLARITY UR: CLEAR
CO2 SERPL-SCNC: 28 MMOL/L (ref 22–29)
COLOR UR: YELLOW
CREAT SERPL-MCNC: 0.85 MG/DL (ref 0.57–1)
DEPRECATED RDW RBC AUTO: 39.9 FL (ref 37–54)
EGFRCR SERPLBLD CKD-EPI 2021: 70.7 ML/MIN/1.73
EOSINOPHIL # BLD AUTO: 0.16 10*3/MM3 (ref 0–0.4)
EOSINOPHIL NFR BLD AUTO: 2 % (ref 0.3–6.2)
ERYTHROCYTE [DISTWIDTH] IN BLOOD BY AUTOMATED COUNT: 12.1 % (ref 12.3–15.4)
FERRITIN SERPL-MCNC: 373 NG/ML (ref 13–150)
FOLATE SERPL-MCNC: >20 NG/ML (ref 4.78–24.2)
GLOBULIN UR ELPH-MCNC: 2 GM/DL
GLUCOSE SERPL-MCNC: 95 MG/DL (ref 65–99)
GLUCOSE UR STRIP-MCNC: NEGATIVE MG/DL
HCT VFR BLD AUTO: 43.2 % (ref 34–46.6)
HDLC SERPL-MCNC: 39 MG/DL (ref 40–60)
HGB BLD-MCNC: 15.1 G/DL (ref 12–15.9)
HGB UR QL STRIP.AUTO: NEGATIVE
HYALINE CASTS UR QL AUTO: NORMAL /LPF
IMM GRANULOCYTES # BLD AUTO: 0.02 10*3/MM3 (ref 0–0.05)
IMM GRANULOCYTES NFR BLD AUTO: 0.2 % (ref 0–0.5)
IRON 24H UR-MRATE: 147 MCG/DL (ref 37–145)
IRON SATN MFR SERPL: 40 % (ref 20–50)
KETONES UR QL STRIP: NEGATIVE
LDLC SERPL CALC-MCNC: 59 MG/DL (ref 0–100)
LDLC/HDLC SERPL: 1.36 {RATIO}
LEUKOCYTE ESTERASE UR QL STRIP.AUTO: NEGATIVE
LYMPHOCYTES # BLD AUTO: 2.35 10*3/MM3 (ref 0.7–3.1)
LYMPHOCYTES NFR BLD AUTO: 29 % (ref 19.6–45.3)
MAGNESIUM SERPL-MCNC: 2.4 MG/DL (ref 1.6–2.4)
MCH RBC QN AUTO: 32.2 PG (ref 26.6–33)
MCHC RBC AUTO-ENTMCNC: 35 G/DL (ref 31.5–35.7)
MCV RBC AUTO: 92.1 FL (ref 79–97)
MONOCYTES # BLD AUTO: 0.77 10*3/MM3 (ref 0.1–0.9)
MONOCYTES NFR BLD AUTO: 9.5 % (ref 5–12)
NEUTROPHILS NFR BLD AUTO: 4.74 10*3/MM3 (ref 1.7–7)
NEUTROPHILS NFR BLD AUTO: 58.6 % (ref 42.7–76)
NITRITE UR QL STRIP: NEGATIVE
NRBC BLD AUTO-RTO: 0 /100 WBC (ref 0–0.2)
PH UR STRIP.AUTO: 7 [PH] (ref 5–8)
PLATELET # BLD AUTO: 213 10*3/MM3 (ref 140–450)
PMV BLD AUTO: 8.7 FL (ref 6–12)
POTASSIUM SERPL-SCNC: 4.4 MMOL/L (ref 3.5–5.2)
PROT SERPL-MCNC: 6.9 G/DL (ref 6–8.5)
PROT UR QL STRIP: NEGATIVE
RBC # BLD AUTO: 4.69 10*6/MM3 (ref 3.77–5.28)
RBC # UR STRIP: NORMAL /HPF
REF LAB TEST METHOD: NORMAL
SODIUM SERPL-SCNC: 141 MMOL/L (ref 136–145)
SP GR UR STRIP: 1.02 (ref 1–1.03)
SQUAMOUS #/AREA URNS HPF: NORMAL /HPF
TIBC SERPL-MCNC: 367 MCG/DL (ref 298–536)
TRANSFERRIN SERPL-MCNC: 246 MG/DL (ref 200–360)
TRIGL SERPL-MCNC: 180 MG/DL (ref 0–150)
TSH SERPL DL<=0.05 MIU/L-ACNC: 1.35 UIU/ML (ref 0.27–4.2)
UROBILINOGEN UR QL STRIP: NORMAL
VIT B12 BLD-MCNC: 971 PG/ML (ref 211–946)
VLDLC SERPL-MCNC: 30 MG/DL (ref 5–40)
WBC # UR STRIP: NORMAL /HPF
WBC NRBC COR # BLD AUTO: 8.1 10*3/MM3 (ref 3.4–10.8)

## 2023-11-20 PROCEDURE — 82728 ASSAY OF FERRITIN: CPT

## 2023-11-20 PROCEDURE — 36415 COLL VENOUS BLD VENIPUNCTURE: CPT

## 2023-11-20 PROCEDURE — 82306 VITAMIN D 25 HYDROXY: CPT

## 2023-11-20 PROCEDURE — 80061 LIPID PANEL: CPT

## 2023-11-20 PROCEDURE — 84466 ASSAY OF TRANSFERRIN: CPT

## 2023-11-20 PROCEDURE — 81001 URINALYSIS AUTO W/SCOPE: CPT

## 2023-11-20 PROCEDURE — 82746 ASSAY OF FOLIC ACID SERUM: CPT

## 2023-11-20 PROCEDURE — 85025 COMPLETE CBC W/AUTO DIFF WBC: CPT

## 2023-11-20 PROCEDURE — 83735 ASSAY OF MAGNESIUM: CPT

## 2023-11-20 PROCEDURE — 84075 ASSAY ALKALINE PHOSPHATASE: CPT

## 2023-11-20 PROCEDURE — 86769 SARS-COV-2 COVID-19 ANTIBODY: CPT

## 2023-11-20 PROCEDURE — 84443 ASSAY THYROID STIM HORMONE: CPT

## 2023-11-20 PROCEDURE — 82607 VITAMIN B-12: CPT

## 2023-11-20 PROCEDURE — 84080 ASSAY ALKALINE PHOSPHATASES: CPT

## 2023-11-20 PROCEDURE — 83540 ASSAY OF IRON: CPT

## 2023-11-20 PROCEDURE — 80053 COMPREHEN METABOLIC PANEL: CPT

## 2023-11-21 LAB — SARS-COV-2 AB SERPL QL IA: POSITIVE

## 2023-11-22 LAB
ALP BONE CFR SERPL: 11 % (ref 14–68)
ALP INTEST CFR SERPL: 85 % (ref 0–18)
ALP LIVER CFR SERPL: 4 % (ref 18–85)
ALP SERPL-CCNC: 166 IU/L (ref 44–121)

## 2023-11-27 LAB
HFE GENE MUT ANL BLD/T: NORMAL
IMP & REVIEW OF LAB RESULTS: NORMAL

## 2023-11-30 ENCOUNTER — OFFICE VISIT (OUTPATIENT)
Dept: INTERNAL MEDICINE | Facility: CLINIC | Age: 78
End: 2023-11-30
Payer: MEDICARE

## 2023-11-30 VITALS
TEMPERATURE: 97.8 F | HEART RATE: 68 BPM | RESPIRATION RATE: 18 BRPM | DIASTOLIC BLOOD PRESSURE: 62 MMHG | WEIGHT: 165.6 LBS | HEIGHT: 66 IN | SYSTOLIC BLOOD PRESSURE: 118 MMHG | OXYGEN SATURATION: 97 % | BODY MASS INDEX: 26.61 KG/M2

## 2023-11-30 DIAGNOSIS — R79.89 ELEVATED FERRITIN: ICD-10-CM

## 2023-11-30 DIAGNOSIS — E55.9 VITAMIN D DEFICIENCY: ICD-10-CM

## 2023-11-30 DIAGNOSIS — R74.8 ABNORMAL LEVELS OF OTHER SERUM ENZYMES: ICD-10-CM

## 2023-11-30 DIAGNOSIS — R74.8 ELEVATED ALKALINE PHOSPHATASE LEVEL: Primary | ICD-10-CM

## 2023-11-30 DIAGNOSIS — I25.83 CORONARY ATHEROSCLEROSIS DUE TO LIPID RICH PLAQUE: ICD-10-CM

## 2023-11-30 DIAGNOSIS — I25.10 CORONARY ATHEROSCLEROSIS DUE TO LIPID RICH PLAQUE: ICD-10-CM

## 2023-11-30 DIAGNOSIS — J45.20 MILD INTERMITTENT ASTHMA WITHOUT COMPLICATION: ICD-10-CM

## 2023-11-30 DIAGNOSIS — R53.1 WEAKNESS: ICD-10-CM

## 2023-11-30 DIAGNOSIS — E78.2 MIXED HYPERLIPIDEMIA: ICD-10-CM

## 2023-11-30 PROCEDURE — 99214 OFFICE O/P EST MOD 30 MIN: CPT

## 2023-11-30 PROCEDURE — 1159F MED LIST DOCD IN RCRD: CPT

## 2023-11-30 PROCEDURE — 1160F RVW MEDS BY RX/DR IN RCRD: CPT

## 2023-11-30 NOTE — ASSESSMENT & PLAN NOTE
11/30/2023-history of coronary artery calcifications. Continue with simvastatin, zetia and daily ASA.    8/29/2023  History of coronary artery calcifications. Echocardiogram was reviewed with patient.  Stress test negative  She has been seen by cardiologist.  Continue current regimen, including low dose ASA

## 2023-11-30 NOTE — PROGRESS NOTES
Chief Complaint  Follow-up (78 year old female here today for a 4 month follow up and lab review. States she is doing well. She denies any issues or concerns at this time )    History of Present Illness  SUBJECTIVE  Maryam Sandoval presents to [facility] follow up on chronic disease management.      Past Medical History:   Diagnosis Date    Acne rosacea 11/2/2021    Arthritis     Asthma 11/2/2021    Asthma episode with extreme mold exposure     Bronchospasm 11/2/2021    Diverticulitis 11/2/2021 8/2005. 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    Hyperlipidemia 11/2/2021    Ovarian cyst 11/2/2021 1993      Family History   Problem Relation Age of Onset    Malig Hyperthermia Neg Hx       Past Surgical History:   Procedure Laterality Date    APPENDECTOMY      BLEPHAROPLASTY Bilateral 12/19/2017    Procedure: BILATERAL  BLEPHAROPLASTY LOWER LID ;  Surgeon: Zachery Fermin MD;  Location: [facility];  Service:     COLON RESECTION      ENTROPIAN REPAIR Bilateral 12/19/2017    Procedure: RIGHT LOWER LID ENTROPION REPAIR, LEFT LOWER LID ECTROPION REPAIR;  Surgeon: Zachery Fermin MD;  Location: [facility];  Service:     HERNIA REPAIR      JOINT REPLACEMENT      KNEE ARTHROPLASTY Right     LAPAROSCOPIC CHOLECYSTECTOMY          Current Outpatient Medications:     aspirin 81 MG EC tablet, Take 1 tablet by mouth Daily., Disp: 99 tablet, Rfl: 99    ezetimibe (Zetia) 10 MG tablet, Take 1 tablet by mouth Daily., Disp: 90 tablet, Rfl: 2    montelukast (SINGULAIR) 10 MG tablet, Take 1 tablet by mouth Daily., Disp: 90 tablet, Rfl: 3    simvastatin (ZOCOR) 40 MG tablet, Take 1 tablet by mouth every night at bedtime., Disp: 90 tablet, Rfl: 3    vitamin D (ERGOCALCIFEROL) 1.25 MG (42955 UT) capsule capsule, Take 1 capsule by mouth Every 7 (Seven) Days., Disp: 12 capsule, Rfl: 3    Fluticasone-Salmeterol (ADVAIR/WIXELA) 100-50 MCG/ACT DISKUS, Inhale 1  "puff 2 (Two) Times a Day. (Patient not taking: Reported on 5/26/2023), Disp: 180 each, Rfl: 0    OBJECTIVE  Vital Signs:   /62 (BP Location: Left arm, Patient Position: Sitting)   Pulse 68   Temp 97.8 °F (36.6 °C) (Temporal)   Resp 18   Ht 167.6 cm (65.98\")   Wt 75.1 kg (165 lb 9.6 oz)   SpO2 97%   BMI 26.74 kg/m²    Estimated body mass index is 26.74 kg/m² as calculated from the following:    Height as of this encounter: 167.6 cm (65.98\").    Weight as of this encounter: 75.1 kg (165 lb 9.6 oz).     Wt Readings from Last 3 Encounters:   11/30/23 75.1 kg (165 lb 9.6 oz)   08/29/23 75.5 kg (166 lb 6.4 oz)   08/14/23 77.6 kg (171 lb)     BP Readings from Last 3 Encounters:   11/30/23 118/62   08/29/23 130/60   08/14/23 119/57       Physical Exam  Vitals and nursing note reviewed.   Constitutional:       Appearance: Normal appearance.   HENT:      Head: Normocephalic.   Eyes:      Extraocular Movements: Extraocular movements intact.      Conjunctiva/sclera: Conjunctivae normal.   Cardiovascular:      Rate and Rhythm: Normal rate and regular rhythm.      Heart sounds: Normal heart sounds. No murmur heard.  Pulmonary:      Effort: Pulmonary effort is normal.      Breath sounds: Normal breath sounds. No wheezing or rales.   Abdominal:      General: Bowel sounds are normal.      Palpations: Abdomen is soft.      Tenderness: There is no abdominal tenderness. There is no guarding.   Musculoskeletal:         General: No swelling. Normal range of motion.   Skin:     General: Skin is warm and dry.   Neurological:      General: No focal deficit present.      Mental Status: She is alert and oriented to person, place, and time. Mental status is at baseline.   Psychiatric:         Mood and Affect: Mood normal.         Behavior: Behavior normal.         Thought Content: Thought content normal.         Judgment: Judgment normal.          Result Review    [facility]          5/16/2023    10:07 8/14/2023    09:47 11/20/2023    " 09:36   CMP   Glucose 98  89  95    BUN 14  16  17    Creatinine 0.88  0.70  0.85    EGFR 68.2  89.8  70.7    Sodium 141  142  141    Potassium 4.4  4.4  4.4    Chloride 106  106  104    Calcium 9.9  10.3  10.4    Total Protein 7.0  6.8  6.9    Albumin 4.5  4.8  4.9    Globulin 2.5  2.0  2.0    Total Bilirubin 0.5  0.4  0.6    Alkaline Phosphatase 161  174  166     165    AST (SGOT) 25  24  23    ALT (SGPT) 23  26  27    Albumin/Globulin Ratio 1.8  2.4  2.5    BUN/Creatinine Ratio 15.9  22.9  20.0    Anion Gap 9.0  8.0  9.0      CBC w/diff          5/16/2023    10:07 8/14/2023    09:47 11/20/2023    09:36   CBC w/Diff   WBC 7.58  8.07  8.10    RBC 4.67  4.68  4.69    Hemoglobin 15.2  14.7  15.1    Hematocrit 42.6  43.4  43.2    MCV 91.2  92.7  92.1    MCH 32.5  31.4  32.2    MCHC 35.7  33.9  35.0    RDW 12.0  12.0  12.1    Platelets 213  206  213    Neutrophil Rel % 54.2  55.8  58.6    Immature Granulocyte Rel % 0.4  0.4  0.2    Lymphocyte Rel % 31.5  31.2  29.0    Monocyte Rel % 10.4  10.3  9.5    Eosinophil Rel % 2.6  1.7  2.0    Basophil Rel % 0.9  0.6  0.7      Lipid Panel          5/16/2023    10:07 8/14/2023    09:47 11/20/2023    09:36   Lipid Panel   Total Cholesterol 152  128  128    Triglycerides 216  196  180    HDL Cholesterol 34  38  39    VLDL Cholesterol 36  32  30    LDL Cholesterol  82  58  59    LDL/HDL Ratio 2.20  1.34  1.36      TSH          8/14/2023    09:47 11/20/2023    09:36   TSH   TSH 1.620  1.350           Lab Results   Component Value Date    FREET4 0.99 08/14/2023     Lab Results   Component Value Date    SEQWSVTA65 971 (H) 11/20/2023       No Images in the past 120 days found..     The above data has been reviewed by CHEN Mcintosh 11/30/2023 15:16 EST.          Patient Care Team:  Sofy Branch APRN as PCP - General (Internal Medicine)            ASSESSMENT & PLAN    Diagnoses and all orders for this visit:    1. Elevated alkaline phosphatase level (Primary)  -     Ambulatory  Referral to Gastroenterology  -     CBC & Differential; Future  -     Comprehensive Metabolic Panel; Future  -     Lipid Panel; Future  -     Mitochondrial Antibodies, M2; Future  -     Gamma GT; Future  -     Hepatitis panel, acute; Future    2. Mixed hyperlipidemia  Assessment & Plan:  She is doing well with zetia and simvastatin.  Continue current regimen.      Orders:  -     CBC & Differential; Future  -     Comprehensive Metabolic Panel; Future  -     Lipid Panel; Future  -     TSH Rfx On Abnormal To Free T4; Future  -     Vitamin B12 & Folate; Future    3. Vitamin D deficiency  Assessment & Plan:  Vit D is adequate.  Continue current regimen.     Orders:  -     TSH Rfx On Abnormal To Free T4; Future  -     Vitamin D,25-Hydroxy; Future    4. Coronary atherosclerosis due to lipid rich plaque  Assessment & Plan:  11/30/2023-history of coronary artery calcifications. Continue with simvastatin, zetia and daily ASA.    8/29/2023  History of coronary artery calcifications. Echocardiogram was reviewed with patient.  Stress test negative  She has been seen by cardiologist.  Continue current regimen, including low dose ASA         5. Elevated ferritin  -     Ambulatory Referral to Gastroenterology  -     CBC & Differential; Future  -     Comprehensive Metabolic Panel; Future  -     Lipid Panel; Future    6. Weakness  -     Mitochondrial Antibodies, M2; Future  -     Gamma GT; Future  -     Hepatitis panel, acute; Future    7. Abnormal levels of other serum enzymes  -     Gamma GT; Future    8. Mild intermittent asthma without complication  Overview:  Asthma episode with extreme mold exposure     Assessment & Plan:  Asthma and allergies are well controlled.  She is currently on singulair.  She is not needing her advair or albuterol  Continue current regimen.        Other orders  -     RSVPreF3 Vac Recomb Adjuvanted (AREXVY) 120 MCG/0.5ML reconstituted suspension injection; Inject 0.5 mL into the appropriate muscle as  directed by prescriber 1 (One) Time for 1 dose.  Dispense: 0.5 mL; Refill: 0         Tobacco Use: Low Risk  (12/7/2023)    Patient History     Smoking Tobacco Use: Never     Smokeless Tobacco Use: Never     Passive Exposure: Not on file       Follow Up     Return in about 3 months (around 2/29/2024).    Please note that portions of this note were completed with a voice recognition program.    Patient was given instructions and counseling regarding her condition or for health maintenance advice. Please see specific information pulled into the AVS if appropriate.   I have reviewed information obtained and documented by others and I have confirmed the accuracy of this documented note.    CHEN Mcintosh

## 2023-12-07 NOTE — ASSESSMENT & PLAN NOTE
Asthma and allergies are well controlled.  She is currently on singulair.  She is not needing her advair or albuterol  Continue current regimen.

## 2024-01-04 ENCOUNTER — OFFICE VISIT (OUTPATIENT)
Dept: INTERNAL MEDICINE | Facility: CLINIC | Age: 79
End: 2024-01-04
Payer: MEDICARE

## 2024-01-04 VITALS
HEART RATE: 68 BPM | SYSTOLIC BLOOD PRESSURE: 122 MMHG | OXYGEN SATURATION: 96 % | DIASTOLIC BLOOD PRESSURE: 62 MMHG | HEIGHT: 66 IN | TEMPERATURE: 97 F | BODY MASS INDEX: 27.51 KG/M2 | WEIGHT: 171.2 LBS

## 2024-01-04 DIAGNOSIS — L24.3 IRRITANT CONTACT DERMATITIS DUE TO COSMETICS: Primary | ICD-10-CM

## 2024-01-04 PROCEDURE — 99213 OFFICE O/P EST LOW 20 MIN: CPT

## 2024-01-04 PROCEDURE — 1160F RVW MEDS BY RX/DR IN RCRD: CPT

## 2024-01-04 PROCEDURE — 1159F MED LIST DOCD IN RCRD: CPT

## 2024-01-04 RX ORDER — PREDNISONE 20 MG/1
40 TABLET ORAL DAILY
Qty: 10 TABLET | Refills: 0 | Status: SHIPPED | OUTPATIENT
Start: 2024-01-04 | End: 2024-01-09

## 2024-01-04 RX ORDER — CEPHALEXIN 500 MG/1
500 CAPSULE ORAL 3 TIMES DAILY
Qty: 30 CAPSULE | Refills: 0 | Status: SHIPPED | OUTPATIENT
Start: 2024-01-04 | End: 2024-01-14

## 2024-01-04 NOTE — ASSESSMENT & PLAN NOTE
Patient states she used a serum for her eye lashes and she has had a break out around her eye very soon after starting the serum. She states that it is erythematous. She does have some mild itching. She states she noticed a peeling as well.   She denies pain, fever, chills, myalgias.  She states it is getting better but it has been going on for about 3 weeks. She denies any pain or changes to her vision.  Treatment per orders  Will also have her add in a daily antihistamine. She can also apply aquafor to the area.  If she develops any vision changes, worsening pain, fever, chills, swelling or worsening symptoms, she is to go to the ED right away  She acknowledges understanding.

## 2024-01-04 NOTE — PROGRESS NOTES
Chief Complaint  patient used a new product on her face and caused her face  and Arthritis (In her wrists )    History of Present Illness  SUBJECTIVE  Maryam Sandoval presents to [facility] to discuss a possible reaction to a topical medication.      Past Medical History:   Diagnosis Date    Acne rosacea 11/2/2021    Arthritis     Asthma 11/2/2021    Asthma episode with extreme mold exposure     Bronchospasm 11/2/2021    Diverticulitis 11/2/2021 8/2005. 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 1977    Hyperlipidemia 11/2/2021    Ovarian cyst 11/2/2021 1993      Family History   Problem Relation Age of Onset    Malig Hyperthermia Neg Hx       Past Surgical History:   Procedure Laterality Date    APPENDECTOMY      BLEPHAROPLASTY Bilateral 12/19/2017    Procedure: BILATERAL  BLEPHAROPLASTY LOWER LID ;  Surgeon: Zachery Fermin MD;  Location: [facility];  Service:     COLON RESECTION      ENTROPIAN REPAIR Bilateral 12/19/2017    Procedure: RIGHT LOWER LID ENTROPION REPAIR, LEFT LOWER LID ECTROPION REPAIR;  Surgeon: Zachery Fermin MD;  Location: [facility];  Service:     HERNIA REPAIR      JOINT REPLACEMENT      KNEE ARTHROPLASTY Right     LAPAROSCOPIC CHOLECYSTECTOMY          Current Outpatient Medications:     aspirin 81 MG EC tablet, Take 1 tablet by mouth Daily., Disp: 99 tablet, Rfl: 99    ezetimibe (Zetia) 10 MG tablet, Take 1 tablet by mouth Daily., Disp: 90 tablet, Rfl: 2    montelukast (SINGULAIR) 10 MG tablet, Take 1 tablet by mouth Daily., Disp: 90 tablet, Rfl: 3    simvastatin (ZOCOR) 40 MG tablet, Take 1 tablet by mouth every night at bedtime., Disp: 90 tablet, Rfl: 3    vitamin D (ERGOCALCIFEROL) 1.25 MG (31150 UT) capsule capsule, Take 1 capsule by mouth Every 7 (Seven) Days., Disp: 12 capsule, Rfl: 3    cephalexin (Keflex) 500 MG capsule, Take 1 capsule by mouth 3 (Three) Times a Day for 10 days., Disp: 30 capsule, Rfl: 0     "Fluticasone-Salmeterol (ADVAIR/WIXELA) 100-50 MCG/ACT DISKUS, Inhale 1 puff 2 (Two) Times a Day. (Patient not taking: Reported on 5/26/2023), Disp: 180 each, Rfl: 0    predniSONE (DELTASONE) 20 MG tablet, Take 2 tablets by mouth Daily for 5 days., Disp: 10 tablet, Rfl: 0    OBJECTIVE  Vital Signs:   /62 (BP Location: Left arm, Patient Position: Sitting, Cuff Size: Small Adult)   Pulse 68   Temp 97 °F (36.1 °C) (Temporal)   Ht 167.6 cm (65.98\")   Wt 77.7 kg (171 lb 3.2 oz)   SpO2 96%   BMI 27.65 kg/m²    Estimated body mass index is 27.65 kg/m² as calculated from the following:    Height as of this encounter: 167.6 cm (65.98\").    Weight as of this encounter: 77.7 kg (171 lb 3.2 oz).     Wt Readings from Last 3 Encounters:   01/04/24 77.7 kg (171 lb 3.2 oz)   11/30/23 75.1 kg (165 lb 9.6 oz)   08/29/23 75.5 kg (166 lb 6.4 oz)     BP Readings from Last 3 Encounters:   01/04/24 122/62   11/30/23 118/62   08/29/23 130/60       Physical Exam  Vitals and nursing note reviewed.   Constitutional:       Appearance: Normal appearance.   HENT:      Head: Normocephalic.   Eyes:      Extraocular Movements: Extraocular movements intact.      Conjunctiva/sclera: Conjunctivae normal.      Right eye: Right conjunctiva is not injected. No chemosis, exudate or hemorrhage.     Left eye: Left conjunctiva is not injected. No chemosis, exudate or hemorrhage.  Cardiovascular:      Rate and Rhythm: Normal rate and regular rhythm.      Heart sounds: Normal heart sounds. No murmur heard.  Pulmonary:      Effort: Pulmonary effort is normal.      Breath sounds: Normal breath sounds. No wheezing or rales.   Musculoskeletal:         General: No swelling. Normal range of motion.   Skin:     General: Skin is warm and dry.      Comments: Left orbit area is mildly erythematous, there is no warmth to palpation, significant swelling or pain.   Neurological:      General: No focal deficit present.      Mental Status: She is alert. Mental " status is at baseline.   Psychiatric:         Mood and Affect: Mood normal.         Thought Content: Thought content normal.          Result Review        No Images in the past 120 days found..     The above data has been reviewed by CHEN Mcintosh 01/04/2024 15:24 EST.          Patient Care Team:  Sofy Branch APRN as PCP - General (Internal Medicine)            ASSESSMENT & PLAN    Diagnoses and all orders for this visit:    1. Irritant contact dermatitis due to cosmetics (Primary)  Assessment & Plan:  Patient states she used a serum for her eye lashes and she has had a break out around her eye very soon after starting the serum. She states that it is erythematous. She does have some mild itching. She states she noticed a peeling as well.   She denies pain, fever, chills, myalgias.  She states it is getting better but it has been going on for about 3 weeks. She denies any pain or changes to her vision.  Treatment per orders  Will also have her add in a daily antihistamine. She can also apply aquafor to the area.  If she develops any vision changes, worsening pain, fever, chills, swelling or worsening symptoms, she is to go to the ED right away  She acknowledges understanding.      Other orders  -     cephalexin (Keflex) 500 MG capsule; Take 1 capsule by mouth 3 (Three) Times a Day for 10 days.  Dispense: 30 capsule; Refill: 0  -     predniSONE (DELTASONE) 20 MG tablet; Take 2 tablets by mouth Daily for 5 days.  Dispense: 10 tablet; Refill: 0         Tobacco Use: Low Risk  (1/4/2024)    Patient History     Smoking Tobacco Use: Never     Smokeless Tobacco Use: Never     Passive Exposure: Not on file       Follow Up     Return if symptoms worsen or fail to improve.    Please note that portions of this note were completed with a voice recognition program.    Patient was given instructions and counseling regarding her condition or for health maintenance advice. Please see specific information pulled into the AVS if  appropriate.   I have reviewed information obtained and documented by others and I have confirmed the accuracy of this documented note.    CHEN Mcintosh

## 2024-01-24 ENCOUNTER — TELEPHONE (OUTPATIENT)
Dept: INTERNAL MEDICINE | Facility: CLINIC | Age: 79
End: 2024-01-24
Payer: MEDICARE

## 2024-01-24 ENCOUNTER — OFFICE VISIT (OUTPATIENT)
Dept: GASTROENTEROLOGY | Facility: CLINIC | Age: 79
End: 2024-01-24
Payer: MEDICARE

## 2024-01-24 VITALS
HEIGHT: 65 IN | HEART RATE: 67 BPM | BODY MASS INDEX: 28.92 KG/M2 | WEIGHT: 173.6 LBS | DIASTOLIC BLOOD PRESSURE: 63 MMHG | SYSTOLIC BLOOD PRESSURE: 116 MMHG

## 2024-01-24 DIAGNOSIS — R94.5 ABNORMAL RESULTS OF LIVER FUNCTION STUDIES: ICD-10-CM

## 2024-01-24 DIAGNOSIS — R74.8 ABNORMAL LEVELS OF OTHER SERUM ENZYMES: ICD-10-CM

## 2024-01-24 DIAGNOSIS — R74.01 ELEVATION OF LEVELS OF LIVER TRANSAMINASE LEVELS: ICD-10-CM

## 2024-01-24 DIAGNOSIS — Z87.19 HISTORY OF PANCREATITIS: ICD-10-CM

## 2024-01-24 DIAGNOSIS — R79.89 ELEVATED LIVER FUNCTION TESTS: Primary | ICD-10-CM

## 2024-01-24 PROCEDURE — 1160F RVW MEDS BY RX/DR IN RCRD: CPT | Performed by: NURSE PRACTITIONER

## 2024-01-24 PROCEDURE — 99204 OFFICE O/P NEW MOD 45 MIN: CPT | Performed by: NURSE PRACTITIONER

## 2024-01-24 PROCEDURE — 1159F MED LIST DOCD IN RCRD: CPT | Performed by: NURSE PRACTITIONER

## 2024-01-24 NOTE — PROGRESS NOTES
Chief Complaint        Elevated Hepatic Enzymes, Diarrhea, and Constipation    History of Present Illness      Maryam Sandoval is a 78 y.o. female who presents to [facility] as a new patient for elevated liver enzymes.      Most recent labs show an elevated alkaline phosphatase at 165 this past November.  She had an MRCP done in July 2022 that showed near resolution of the focal pancreatitis involving the pancreatic tail.  No intrahepatic or extrahepatic biliary dilatation patient status postcholecystectomy.  Liver appeared normal at that time.  She did have a right hepatic cyst noted.  She had a CT scan of the abdomen and pelvis done in July 2022 that showed a small hiatal hernia.  Positive for pancreatitis.    Last EGD/colonoscopy----Admits she has had several colonoscopies in the past. She does have hx colon polyps.     She admits her bowels can be loose depending on what she eats. She has been diagnosed IBS in the past. Denies any rectal bleeding or melena.     She denies any reflux or dysphagia. Good appetite.       GI family history----denies any     Results       Result Review :   The following data was reviewed by: CHEN Grace on 01/24/2024     CMP          5/16/2023    10:07 8/14/2023    09:47 11/20/2023    09:36   CMP   Glucose 98  89  95    BUN 14  16  17    Creatinine 0.88  0.70  0.85    EGFR 68.2  89.8  70.7    Sodium 141  142  141    Potassium 4.4  4.4  4.4    Chloride 106  106  104    Calcium 9.9  10.3  10.4    Total Protein 7.0  6.8  6.9    Albumin 4.5  4.8  4.9    Globulin 2.5  2.0  2.0    Total Bilirubin 0.5  0.4  0.6    Alkaline Phosphatase 161  174  166     165    AST (SGOT) 25  24  23    ALT (SGPT) 23  26  27    Albumin/Globulin Ratio 1.8  2.4  2.5    BUN/Creatinine Ratio 15.9  22.9  20.0    Anion Gap 9.0  8.0  9.0      CBC          5/16/2023    10:07 8/14/2023    09:47 11/20/2023    09:36   CBC   WBC 7.58  8.07  8.10    RBC 4.67  4.68   "4.69    Hemoglobin 15.2  14.7  15.1    Hematocrit 42.6  43.4  43.2    MCV 91.2  92.7  92.1    MCH 32.5  31.4  32.2    MCHC 35.7  33.9  35.0    RDW 12.0  12.0  12.1    Platelets 213  206  213      Lipid Panel          5/16/2023    10:07 8/14/2023    09:47 11/20/2023    09:36   Lipid Panel   Total Cholesterol 152  128  128    Triglycerides 216  196  180    HDL Cholesterol 34  38  39    VLDL Cholesterol 36  32  30    LDL Cholesterol  82  58  59    LDL/HDL Ratio 2.20  1.34  1.36      TSH          8/14/2023    09:47 11/20/2023    09:36   TSH   TSH 1.620  1.350        Lipase   Lipase   Date Value Ref Range Status   07/15/2022 36 10 - 50 Units/Liter Final     Amylase No results found for: \"AMYLASE\"  Iron Profile   Iron   Date Value Ref Range Status   11/20/2023 147 (H) 37 - 145 mcg/dL Final     TIBC   Date Value Ref Range Status   11/20/2023 367 298 - 536 mcg/dL Final     Iron Saturation (TSAT)   Date Value Ref Range Status   11/20/2023 40 20 - 50 % Final     Transferrin   Date Value Ref Range Status   11/20/2023 246 200 - 360 mg/dL Final     Ferritin   Ferritin   Date Value Ref Range Status   11/20/2023 373.00 (H) 13.00 - 150.00 ng/mL Final            Past Medical History       Past Medical History:   Diagnosis Date    Acne rosacea 11/02/2021    Arthritis     Asthma 11/02/2021    Asthma episode with extreme mold exposure     Bronchospasm 11/02/2021    Cholelithiasis     Colon polyp     Diverticulitis 11/02/2021 8/2005. 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    Hyperlipidemia 11/02/2021    Irritable bowel syndrome     Ovarian cyst 11/02/2021    1993    Pancreatitis two episodes    Ulcerative colitis        Past Surgical History:   Procedure Laterality Date    APPENDECTOMY      BLEPHAROPLASTY Bilateral 12/19/2017    Procedure: BILATERAL  BLEPHAROPLASTY LOWER LID ;  Surgeon: Zachery Fermin MD;  Location: [facility];  Service:     COLON RESECTION      " "COLONOSCOPY      ENTROPIAN REPAIR Bilateral 12/19/2017    Procedure: RIGHT LOWER LID ENTROPION REPAIR, LEFT LOWER LID ECTROPION REPAIR;  Surgeon: Zachery Fermin MD;  Location: [facility] OR [facility] [place];  Service:     HERNIA REPAIR      JOINT REPLACEMENT      KNEE ARTHROPLASTY Right     LAPAROSCOPIC CHOLECYSTECTOMY           Current Outpatient Medications:     aspirin 81 MG EC tablet, Take 1 tablet by mouth Daily., Disp: 99 tablet, Rfl: 99    ezetimibe (Zetia) 10 MG tablet, Take 1 tablet by mouth Daily., Disp: 90 tablet, Rfl: 2    Fluticasone-Salmeterol (ADVAIR/WIXELA) 100-50 MCG/ACT DISKUS, Inhale 1 puff 2 (Two) Times a Day., Disp: 180 each, Rfl: 0    montelukast (SINGULAIR) 10 MG tablet, Take 1 tablet by mouth Daily., Disp: 90 tablet, Rfl: 3    simvastatin (ZOCOR) 40 MG tablet, Take 1 tablet by mouth every night at bedtime., Disp: 90 tablet, Rfl: 3    vitamin D (ERGOCALCIFEROL) 1.25 MG (72709 UT) capsule capsule, Take 1 capsule by mouth Every 7 (Seven) Days., Disp: 12 capsule, Rfl: 3     Allergies   Allergen Reactions    Cipro [Ciprofloxacin Hcl] GI Intolerance    Flagyl [Metronidazole] GI Intolerance       Family History   Problem Relation Age of Onset    Irritable bowel syndrome Mother     Cirrhosis Father     Alcohol abuse Father     Malig Hyperthermia Neg Hx         Social History     Social History Narrative    Not on file       Objective       Objective     Vital Signs:   /63 (BP Location: Right arm, Patient Position: Sitting, Cuff Size: Adult)   Pulse 67   Ht 165.1 cm (65\")   Wt 78.7 kg (173 lb 9.6 oz)   BMI 28.89 kg/m²     Body mass index is 28.89 kg/m².    Review of Systems   Constitutional:  Negative for appetite change, chills, diaphoresis, fatigue, fever and unexpected weight change.   HENT:  Negative for nosebleeds, postnasal drip, sore throat, trouble swallowing and voice change.    Respiratory:  Negative for cough, choking, chest tightness, shortness of breath, wheezing and stridor.  "   Cardiovascular:  Negative for chest pain, palpitations and leg swelling.   Gastrointestinal:  Positive for diarrhea. Negative for abdominal distention, abdominal pain, anal bleeding, blood in stool, constipation, nausea, rectal pain and vomiting.   Endocrine: Negative for polydipsia, polyphagia and polyuria.   Musculoskeletal:  Negative for gait problem.   Skin:  Negative for rash and wound.   Allergic/Immunologic: Negative for food allergies.   Neurological:  Negative for dizziness, speech difficulty and light-headedness.   Psychiatric/Behavioral:  Negative for confusion, self-injury, sleep disturbance and suicidal ideas.         Physical Exam  Constitutional:       General: She is not in acute distress.     Appearance: She is well-developed. She is not ill-appearing.   HENT:      Head: Normocephalic.   Eyes:      Pupils: Pupils are equal, round, and reactive to light.   Cardiovascular:      Rate and Rhythm: Normal rate and regular rhythm.      Heart sounds: Normal heart sounds.   Pulmonary:      Effort: Pulmonary effort is normal.      Breath sounds: Normal breath sounds.   Abdominal:      General: Bowel sounds are normal. There is no distension.      Palpations: Abdomen is soft. There is no mass.      Tenderness: There is no abdominal tenderness. There is no guarding or rebound.      Hernia: No hernia is present.   Musculoskeletal:         General: Normal range of motion.   Skin:     General: Skin is warm and dry.   Neurological:      Mental Status: She is alert and oriented to person, place, and time.   Psychiatric:         Speech: Speech normal.         Behavior: Behavior normal.         Judgment: Judgment normal.              Assessment & Plan          Assessment and Plan    Diagnoses and all orders for this visit:    1. Elevated liver function tests (Primary)  -     Protime-INR; Future  -     Hepatitis Panel, Acute  -     [facility]  -     Alpha - 1 - Antitrypsin  -     Anti-Smooth Muscle Antibody Titer  -      Ceruloplasmin  -     Immunofixation, Serum; Future  -     Iron Profile; Future  -     Mitochondrial Antibodies, M2  -     US Abdomen Limited  -     Gamma GT  -     Comprehensive Metabolic Panel    2. Abnormal results of liver function studies  -     Protime-INR; Future  -     Hepatitis Panel, Acute  -     [facility]  -     Alpha - 1 - Antitrypsin  -     Anti-Smooth Muscle Antibody Titer  -     Ceruloplasmin  -     Immunofixation, Serum; Future  -     Iron Profile; Future  -     Mitochondrial Antibodies, M2  -     US Abdomen Limited  -     Gamma GT  -     Comprehensive Metabolic Panel    3. History of pancreatitis    4. Abnormal levels of other serum enzymes  -     Protime-INR; Future  -     Hepatitis Panel, Acute  -     [facility]  -     Alpha - 1 - Antitrypsin  -     Anti-Smooth Muscle Antibody Titer  -     Ceruloplasmin  -     Immunofixation, Serum; Future  -     Iron Profile; Future  -     Mitochondrial Antibodies, M2  -     US Abdomen Limited  -     Gamma GT  -     Comprehensive Metabolic Panel    5. Elevation of levels of liver transaminase levels  -     Protime-INR; Future  -     Hepatitis Panel, Acute  -     [facility]  -     Alpha - 1 - Antitrypsin  -     Anti-Smooth Muscle Antibody Titer  -     Ceruloplasmin  -     Immunofixation, Serum; Future  -     Iron Profile; Future  -     Mitochondrial Antibodies, M2  -     US Abdomen Limited  -     Gamma GT  -     Comprehensive Metabolic Panel    Reviewed medical history with her today.  Alkaline phosphatase has been steadily elevated since at least 2021.  Will check liver serology and abdominal ultrasound for further evaluation.  Bowels moving well currently.  The patient feels like her bowels are manageable at this time.  No reflux.  Good appetite.  Is up-to-date with her screenings.  Patient to call the office with any issues.  Patient to follow-up with me in 2 months.  Patient is agreeable to the plan.            Follow Up       Follow Up   Return in about 2 months (around  3/24/2024) for ELEVATED LIVER ENZYMES.  Patient was given instructions and counseling regarding her condition or for health maintenance advice. Please see specific information pulled into the AVS if appropriate.

## 2024-01-24 NOTE — TELEPHONE ENCOUNTER
Caller: Maryam Sandoval       Best call back number: [phone]    What is your medical concern? PATIENT STILL HAS SWELLING AND PEELING AROUND EYES EVEN AFTER TAKING MEDICATIONS. SHE IS WANTING TO KNOW WHAT TO DO NEXT.     Is your provider already aware of this issue? YES    Have you been treated for this issue? YES

## 2024-01-25 ENCOUNTER — TELEPHONE (OUTPATIENT)
Dept: INTERNAL MEDICINE | Facility: CLINIC | Age: 79
End: 2024-01-25

## 2024-01-25 ENCOUNTER — OFFICE VISIT (OUTPATIENT)
Dept: INTERNAL MEDICINE | Facility: CLINIC | Age: 79
End: 2024-01-25
Payer: MEDICARE

## 2024-01-25 VITALS
TEMPERATURE: 98.6 F | DIASTOLIC BLOOD PRESSURE: 66 MMHG | SYSTOLIC BLOOD PRESSURE: 118 MMHG | RESPIRATION RATE: 18 BRPM | HEART RATE: 64 BPM | BODY MASS INDEX: 28.49 KG/M2 | WEIGHT: 171 LBS | HEIGHT: 65 IN | OXYGEN SATURATION: 98 %

## 2024-01-25 DIAGNOSIS — L24.3 IRRITANT CONTACT DERMATITIS DUE TO COSMETICS: Primary | ICD-10-CM

## 2024-01-25 PROCEDURE — 1159F MED LIST DOCD IN RCRD: CPT

## 2024-01-25 PROCEDURE — 1160F RVW MEDS BY RX/DR IN RCRD: CPT

## 2024-01-25 PROCEDURE — 99213 OFFICE O/P EST LOW 20 MIN: CPT

## 2024-01-25 RX ORDER — PREDNISONE 20 MG/1
40 TABLET ORAL DAILY
Qty: 10 TABLET | Refills: 0 | Status: SHIPPED | OUTPATIENT
Start: 2024-01-25 | End: 2024-01-30

## 2024-01-25 RX ORDER — DIAPER,BRIEF,INFANT-TODD,DISP
1 EACH MISCELLANEOUS 2 TIMES DAILY
Qty: 15 G | Refills: 1 | Status: SHIPPED | OUTPATIENT
Start: 2024-01-25

## 2024-01-25 NOTE — ASSESSMENT & PLAN NOTE
1/25/2024-pt states that her rash improved after the steroids but has come back and is more erythematous. She states that it does itch.  She denies any eye pain, fever, chills.  We will start her on topical steroid-low potency-she has derm appt in march, the soonest available.may even consider topical abx such as clindamycin if no improvement  F/u if no improvement.     1/4/2024-Patient states she used a serum for her eye lashes and she has had a break out around her eye very soon after starting the serum. She states that it is erythematous. She does have some mild itching. She states she noticed a peeling as well.   She denies pain, fever, chills, myalgias.  She states it is getting better but it has been going on for about 3 weeks. She denies any pain or changes to her vision.  Treatment per orders  Will also have her add in a daily antihistamine. She can also apply aquafor to the area.  If she develops any vision changes, worsening pain, fever, chills, swelling or worsening symptoms, she is to go to the ED right away  She acknowledges understanding.

## 2024-01-25 NOTE — PROGRESS NOTES
Chief Complaint  Rash (78 year old female here today complaining of a rash around her eyes X a few weeks. States it is itchy and flakes. )    History of Present Illness  SUBJECTIVE  Maryam Sandoval presents to [facility] with a rash on her face. She states that it does itch and does flake occasionally. She was treated with prednisone and keflex last week.   She denies any vision changes or blurred vision.  Denies any eye pain.   She has appt with dermatology in March      Past Medical History:   Diagnosis Date    Acne rosacea 11/02/2021    Arthritis     Asthma 11/02/2021    Asthma episode with extreme mold exposure     Bronchospasm 11/02/2021    Cholelithiasis     Colon polyp     Diverticulitis 11/02/2021 8/2005. 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    Hyperlipidemia 11/02/2021    Irritable bowel syndrome     Ovarian cyst 11/02/2021    1993    Pancreatitis two episodes    Ulcerative colitis       Family History   Problem Relation Age of Onset    Irritable bowel syndrome Mother     Cirrhosis Father     Alcohol abuse Father     Malig Hyperthermia Neg Hx       Past Surgical History:   Procedure Laterality Date    APPENDECTOMY      BLEPHAROPLASTY Bilateral 12/19/2017    Procedure: BILATERAL  BLEPHAROPLASTY LOWER LID ;  Surgeon: Zachery Fermin MD;  [place] [facility] [place] [facility];  Service:     COLON RESECTION      COLONOSCOPY      ENTROPIAN REPAIR Bilateral 12/19/2017    Procedure: RIGHT LOWER LID ENTROPION REPAIR, LEFT LOWER LID ECTROPION REPAIR;  Surgeon: Zachery Fermin MD;  Location: [facility];  Service:     HERNIA REPAIR      JOINT REPLACEMENT      KNEE ARTHROPLASTY Right     LAPAROSCOPIC CHOLECYSTECTOMY          Current Outpatient Medications:     aspirin 81 MG EC tablet, Take 1 tablet by mouth Daily., Disp: 99 tablet, Rfl: 99    ezetimibe (Zetia) 10 MG tablet, Take 1 tablet by mouth Daily., Disp:  "90 tablet, Rfl: 2    montelukast (SINGULAIR) 10 MG tablet, Take 1 tablet by mouth Daily., Disp: 90 tablet, Rfl: 3    simvastatin (ZOCOR) 40 MG tablet, Take 1 tablet by mouth every night at bedtime., Disp: 90 tablet, Rfl: 3    vitamin D (ERGOCALCIFEROL) 1.25 MG (75525 UT) capsule capsule, Take 1 capsule by mouth Every 7 (Seven) Days., Disp: 12 capsule, Rfl: 3    hydrocortisone 0.5 % cream, Apply 1 application  topically to the appropriate area as directed 2 (Two) Times a Day., Disp: 15 g, Rfl: 1    predniSONE (DELTASONE) 20 MG tablet, Take 2 tablets by mouth Daily for 5 days., Disp: 10 tablet, Rfl: 0    OBJECTIVE  Vital Signs:   /66 (BP Location: Left arm, Patient Position: Sitting)   Pulse 64   Temp 98.6 °F (37 °C) (Temporal)   Resp 18   Ht 165.1 cm (65\")   Wt 77.6 kg (171 lb)   SpO2 98%   BMI 28.46 kg/m²    Estimated body mass index is 28.46 kg/m² as calculated from the following:    Height as of this encounter: 165.1 cm (65\").    Weight as of this encounter: 77.6 kg (171 lb).     Wt Readings from Last 3 Encounters:   01/25/24 77.6 kg (171 lb)   01/24/24 78.7 kg (173 lb 9.6 oz)   01/04/24 77.7 kg (171 lb 3.2 oz)     BP Readings from Last 3 Encounters:   01/25/24 118/66   01/24/24 116/63   01/04/24 122/62       Physical Exam  Vitals and nursing note reviewed.   Constitutional:       Appearance: Normal appearance.   HENT:      Head: Normocephalic.   Eyes:      Extraocular Movements: Extraocular movements intact.      Right eye: Normal extraocular motion.      Left eye: Normal extraocular motion.      Conjunctiva/sclera: Conjunctivae normal.      Right eye: No chemosis, exudate or hemorrhage.     Left eye: No chemosis, exudate or hemorrhage.  Cardiovascular:      Rate and Rhythm: Normal rate and regular rhythm.   Pulmonary:      Effort: Pulmonary effort is normal.      Breath sounds: Normal breath sounds.   Abdominal:      General: Bowel sounds are normal.      Palpations: Abdomen is soft. "   Musculoskeletal:         General: No swelling. Normal range of motion.   Skin:     General: Skin is warm and dry.      Findings: Rash is macular (on face-left eye lid and cheek, nasolabial fold).   Neurological:      General: No focal deficit present.      Mental Status: She is alert and oriented to person, place, and time. Mental status is at baseline.          Result Review        No Images in the past 120 days found..     The above data has been reviewed by CHEN Mcintosh 01/25/2024 11:50 EST.          Patient Care Team:  Sofy Branch APRN as PCP - General (Internal Medicine)  Tata Mendez APRN as Nurse Practitioner (Nurse Practitioner)           ASSESSMENT & PLAN    Diagnoses and all orders for this visit:    1. Irritant contact dermatitis due to cosmetics (Primary)  Assessment & Plan:  1/25/2024-pt states that her rash improved after the steroids but has come back and is more erythematous. She states that it does itch.  She denies any eye pain, fever, chills.  We will start her on topical steroid-low potency-she has derm appt in march, the soonest available.may even consider topical abx such as clindamycin if no improvement  F/u if no improvement.     1/4/2024-Patient states she used a serum for her eye lashes and she has had a break out around her eye very soon after starting the serum. She states that it is erythematous. She does have some mild itching. She states she noticed a peeling as well.   She denies pain, fever, chills, myalgias.  She states it is getting better but it has been going on for about 3 weeks. She denies any pain or changes to her vision.  Treatment per orders  Will also have her add in a daily antihistamine. She can also apply aquafor to the area.  If she develops any vision changes, worsening pain, fever, chills, swelling or worsening symptoms, she is to go to the ED right away  She acknowledges understanding.    Orders:  -     hydrocortisone 0.5 % cream; Apply 1  application  topically to the appropriate area as directed 2 (Two) Times a Day.  Dispense: 15 g; Refill: 1  -     predniSONE (DELTASONE) 20 MG tablet; Take 2 tablets by mouth Daily for 5 days.  Dispense: 10 tablet; Refill: 0         Tobacco Use: Low Risk  (1/25/2024)    Patient History     Smoking Tobacco Use: Never     Smokeless Tobacco Use: Never     Passive Exposure: Not on file       Follow Up     Return in about 2 weeks (around 2/8/2024).    Please note that portions of this note were completed with a voice recognition program.    Patient was given instructions and counseling regarding her condition or for health maintenance advice. Please see specific information pulled into the AVS if appropriate.   I have reviewed information obtained and documented by others and I have confirmed the accuracy of this documented note.    CHEN Mcintosh

## 2024-01-29 NOTE — TELEPHONE ENCOUNTER
Pt states that her symptoms have cleared up. If she has a flair up in the future she will call and request referral for derm

## 2024-01-30 RX ORDER — ERGOCALCIFEROL 1.25 MG/1
50000 CAPSULE ORAL
Qty: 12 CAPSULE | Refills: 3 | Status: SHIPPED | OUTPATIENT
Start: 2024-01-30

## 2024-01-30 NOTE — TELEPHONE ENCOUNTER
Caller: Maryam Sandoval    Relationship: Self    Best call back number: [phone]     Requested Prescriptions:   Requested Prescriptions     Pending Prescriptions Disp Refills    vitamin D (ERGOCALCIFEROL) 1.25 MG (06783 UT) capsule capsule 12 capsule 3     Sig: Take 1 capsule by mouth Every 7 (Seven) Days.        Pharmacy where request should be sent: [facility] [address] [phone] [facility] [phone]      Last office visit with prescribing clinician: 1/25/2024   Last telemedicine visit with prescribing clinician: Visit date not found   Next office visit with prescribing clinician: 2/9/2024     Does the patient have less than a 3 day supply:  [] Yes  [x] No    Would you like a call back once the refill request has been completed: [] Yes [] No    If the office needs to give you a call back, can they leave a voicemail: [] Yes [] No    Mehdi Larios Rep   01/30/24 10:36 EST

## 2024-01-31 ENCOUNTER — PATIENT ROUNDING (BHMG ONLY) (OUTPATIENT)
Dept: GASTROENTEROLOGY | Facility: CLINIC | Age: 79
End: 2024-01-31
Payer: MEDICARE

## 2024-01-31 NOTE — PROGRESS NOTES
"1/31/2024      Hello, may I speak with Maryam Sandoval     My name is Mar. I am calling from [facility]. I show that you had a recent visit with CHEN Coreas.    Before we get started may I verify your date of birth? 1945    I am calling to officially welcome you to our practice and ask about your recent visit. Is this a good time to talk?  Yes     Tell me about your visit with us. What things went well? \"It was pretty short visit, she explained lab results ordered testing\"    We strive to ensure that we protect your safety and privacy. Is there anything we could have done to improve this during your visit?    No     We're always looking for ways to make our patients' experiences even better. Do you have recommendations on ways we may improve?  No     Overall were you satisfied with your first visit to our practice? Yes     I appreciate you taking the time to speak with me today. Is there anything else I can do for you?  No     I am glad to hear that you had a very good visit and I appreciate you taking the time to provide feedback on this call. We would greatly appreciate you filling out a survey if you receive one in the mail, email or text. This is a great opportunity to provide any additional feedback that you may think of after this call as well.       Thank you, and have a great day.   "

## 2024-02-14 ENCOUNTER — LAB (OUTPATIENT)
Dept: LAB | Facility: HOSPITAL | Age: 79
End: 2024-02-14
Payer: MEDICARE

## 2024-02-14 ENCOUNTER — HOSPITAL ENCOUNTER (OUTPATIENT)
Dept: ULTRASOUND IMAGING | Facility: HOSPITAL | Age: 79
Discharge: HOME | End: 2024-02-14
Payer: MEDICARE

## 2024-02-14 DIAGNOSIS — R53.1 WEAKNESS: ICD-10-CM

## 2024-02-14 DIAGNOSIS — R74.8 ABNORMAL LEVELS OF OTHER SERUM ENZYMES: ICD-10-CM

## 2024-02-14 DIAGNOSIS — R74.8 ELEVATED ALKALINE PHOSPHATASE LEVEL: ICD-10-CM

## 2024-02-14 DIAGNOSIS — E55.9 VITAMIN D DEFICIENCY: ICD-10-CM

## 2024-02-14 DIAGNOSIS — E78.2 MIXED HYPERLIPIDEMIA: ICD-10-CM

## 2024-02-14 DIAGNOSIS — R79.89 ELEVATED FERRITIN: ICD-10-CM

## 2024-02-14 LAB
25(OH)D3 SERPL-MCNC: 34.9 NG/ML (ref 30–100)
ALBUMIN SERPL-MCNC: 4.8 G/DL (ref 3.5–5.2)
ALBUMIN/GLOB SERPL: 2.5 G/DL
ALP SERPL-CCNC: 148 U/L (ref 39–117)
ALT SERPL W P-5'-P-CCNC: 20 U/L (ref 1–33)
ANION GAP SERPL CALCULATED.3IONS-SCNC: 10 MMOL/L (ref 5–15)
AST SERPL-CCNC: 17 U/L (ref 1–32)
BASOPHILS # BLD AUTO: 0.06 10*3/MM3 (ref 0–0.2)
BASOPHILS NFR BLD AUTO: 0.8 % (ref 0–1.5)
BILIRUB SERPL-MCNC: 0.5 MG/DL (ref 0–1.2)
BUN SERPL-MCNC: 19 MG/DL (ref 8–23)
BUN/CREAT SERPL: 25.3 (ref 7–25)
CALCIUM SPEC-SCNC: 10 MG/DL (ref 8.6–10.5)
CHLORIDE SERPL-SCNC: 107 MMOL/L (ref 98–107)
CHOLEST SERPL-MCNC: 125 MG/DL (ref 0–200)
CO2 SERPL-SCNC: 27 MMOL/L (ref 22–29)
CREAT SERPL-MCNC: 0.75 MG/DL (ref 0.57–1)
DEPRECATED RDW RBC AUTO: 41.7 FL (ref 37–54)
EGFRCR SERPLBLD CKD-EPI 2021: 82.1 ML/MIN/1.73
EOSINOPHIL # BLD AUTO: 0.14 10*3/MM3 (ref 0–0.4)
EOSINOPHIL NFR BLD AUTO: 1.8 % (ref 0.3–6.2)
ERYTHROCYTE [DISTWIDTH] IN BLOOD BY AUTOMATED COUNT: 12.3 % (ref 12.3–15.4)
FOLATE SERPL-MCNC: >20 NG/ML (ref 4.78–24.2)
GGT SERPL-CCNC: 16 U/L (ref 5–36)
GLOBULIN UR ELPH-MCNC: 1.9 GM/DL
GLUCOSE SERPL-MCNC: 90 MG/DL (ref 65–99)
HAV IGM SERPL QL IA: NORMAL
HBV CORE IGM SERPL QL IA: NORMAL
HBV SURFACE AG SERPL QL IA: NORMAL
HCT VFR BLD AUTO: 41.5 % (ref 34–46.6)
HCV AB SER DONR QL: NORMAL
HDLC SERPL-MCNC: 41 MG/DL (ref 40–60)
HGB BLD-MCNC: 14.2 G/DL (ref 12–15.9)
IMM GRANULOCYTES # BLD AUTO: 0.03 10*3/MM3 (ref 0–0.05)
IMM GRANULOCYTES NFR BLD AUTO: 0.4 % (ref 0–0.5)
LDLC SERPL CALC-MCNC: 55 MG/DL (ref 0–100)
LDLC/HDLC SERPL: 1.19 {RATIO}
LYMPHOCYTES # BLD AUTO: 2.33 10*3/MM3 (ref 0.7–3.1)
LYMPHOCYTES NFR BLD AUTO: 30.3 % (ref 19.6–45.3)
MCH RBC QN AUTO: 31.8 PG (ref 26.6–33)
MCHC RBC AUTO-ENTMCNC: 34.2 G/DL (ref 31.5–35.7)
MCV RBC AUTO: 92.8 FL (ref 79–97)
MONOCYTES # BLD AUTO: 0.8 10*3/MM3 (ref 0.1–0.9)
MONOCYTES NFR BLD AUTO: 10.4 % (ref 5–12)
NEUTROPHILS NFR BLD AUTO: 4.32 10*3/MM3 (ref 1.7–7)
NEUTROPHILS NFR BLD AUTO: 56.3 % (ref 42.7–76)
NRBC BLD AUTO-RTO: 0 /100 WBC (ref 0–0.2)
PLATELET # BLD AUTO: 175 10*3/MM3 (ref 140–450)
PMV BLD AUTO: 9 FL (ref 6–12)
POTASSIUM SERPL-SCNC: 4 MMOL/L (ref 3.5–5.2)
PROT SERPL-MCNC: 6.7 G/DL (ref 6–8.5)
RBC # BLD AUTO: 4.47 10*6/MM3 (ref 3.77–5.28)
SODIUM SERPL-SCNC: 144 MMOL/L (ref 136–145)
TRIGL SERPL-MCNC: 177 MG/DL (ref 0–150)
TSH SERPL DL<=0.05 MIU/L-ACNC: 1.13 UIU/ML (ref 0.27–4.2)
VIT B12 BLD-MCNC: 699 PG/ML (ref 211–946)
VLDLC SERPL-MCNC: 29 MG/DL (ref 5–40)
WBC NRBC COR # BLD AUTO: 7.68 10*3/MM3 (ref 3.4–10.8)

## 2024-02-14 PROCEDURE — 80053 COMPREHEN METABOLIC PANEL: CPT

## 2024-02-14 PROCEDURE — 82746 ASSAY OF FOLIC ACID SERUM: CPT

## 2024-02-14 PROCEDURE — 80061 LIPID PANEL: CPT

## 2024-02-14 PROCEDURE — 85025 COMPLETE CBC W/AUTO DIFF WBC: CPT

## 2024-02-14 PROCEDURE — 82607 VITAMIN B-12: CPT

## 2024-02-14 PROCEDURE — 82306 VITAMIN D 25 HYDROXY: CPT

## 2024-02-14 PROCEDURE — 82977 ASSAY OF GGT: CPT

## 2024-02-14 PROCEDURE — 80074 ACUTE HEPATITIS PANEL: CPT

## 2024-02-14 PROCEDURE — 86381 MITOCHONDRIAL ANTIBODY EACH: CPT

## 2024-02-14 PROCEDURE — 76705 ECHO EXAM OF ABDOMEN: CPT

## 2024-02-14 PROCEDURE — 84443 ASSAY THYROID STIM HORMONE: CPT

## 2024-02-14 PROCEDURE — 36415 COLL VENOUS BLD VENIPUNCTURE: CPT

## 2024-02-15 LAB — MITOCHONDRIA M2 IGG SER-ACNC: <20 UNITS (ref 0–20)

## 2024-02-16 ENCOUNTER — TELEPHONE (OUTPATIENT)
Dept: GASTROENTEROLOGY | Facility: CLINIC | Age: 79
End: 2024-02-16
Payer: MEDICARE

## 2024-02-23 ENCOUNTER — TELEPHONE (OUTPATIENT)
Dept: GASTROENTEROLOGY | Facility: CLINIC | Age: 79
End: 2024-02-23
Payer: MEDICARE

## 2024-02-23 NOTE — TELEPHONE ENCOUNTER
S/w pts spouse Yazan listed on [facility] reminded him of pts labs that are ordered, He states he will remind her

## 2024-02-29 ENCOUNTER — OFFICE VISIT (OUTPATIENT)
Dept: INTERNAL MEDICINE | Facility: CLINIC | Age: 79
End: 2024-02-29
Payer: MEDICARE

## 2024-02-29 VITALS
SYSTOLIC BLOOD PRESSURE: 117 MMHG | OXYGEN SATURATION: 95 % | BODY MASS INDEX: 28.66 KG/M2 | WEIGHT: 172 LBS | HEIGHT: 65 IN | DIASTOLIC BLOOD PRESSURE: 69 MMHG | TEMPERATURE: 98.2 F | HEART RATE: 68 BPM

## 2024-02-29 DIAGNOSIS — I25.83 CORONARY ATHEROSCLEROSIS DUE TO LIPID RICH PLAQUE: ICD-10-CM

## 2024-02-29 DIAGNOSIS — R94.5 ABNORMAL RESULTS OF LIVER FUNCTION STUDIES: ICD-10-CM

## 2024-02-29 DIAGNOSIS — J45.20 MILD INTERMITTENT ASTHMA WITHOUT COMPLICATION: ICD-10-CM

## 2024-02-29 DIAGNOSIS — E78.2 MIXED HYPERLIPIDEMIA: ICD-10-CM

## 2024-02-29 DIAGNOSIS — K76.0 HEPATIC STEATOSIS: ICD-10-CM

## 2024-02-29 DIAGNOSIS — I25.10 CORONARY ATHEROSCLEROSIS DUE TO LIPID RICH PLAQUE: ICD-10-CM

## 2024-02-29 DIAGNOSIS — Z00.00 ENCOUNTER FOR SUBSEQUENT ANNUAL WELLNESS VISIT (AWV) IN MEDICARE PATIENT: Primary | ICD-10-CM

## 2024-02-29 DIAGNOSIS — R74.01 ELEVATION OF LEVELS OF LIVER TRANSAMINASE LEVELS: ICD-10-CM

## 2024-02-29 DIAGNOSIS — R79.89 ELEVATED LIVER FUNCTION TESTS: ICD-10-CM

## 2024-02-29 DIAGNOSIS — R74.8 ABNORMAL LEVELS OF OTHER SERUM ENZYMES: ICD-10-CM

## 2024-02-29 DIAGNOSIS — E55.9 VITAMIN D DEFICIENCY: ICD-10-CM

## 2024-02-29 LAB
ALBUMIN SERPL-MCNC: 4.4 G/DL (ref 3.5–5.2)
ALBUMIN/GLOB SERPL: 1.9 G/DL
ALP SERPL-CCNC: 171 U/L (ref 39–117)
ALT SERPL W P-5'-P-CCNC: 21 U/L (ref 1–33)
ANION GAP SERPL CALCULATED.3IONS-SCNC: 13.1 MMOL/L (ref 5–15)
AST SERPL-CCNC: 20 U/L (ref 1–32)
BILIRUB SERPL-MCNC: 0.3 MG/DL (ref 0–1.2)
BUN SERPL-MCNC: 18 MG/DL (ref 8–23)
BUN/CREAT SERPL: 21.7 (ref 7–25)
CALCIUM SPEC-SCNC: 9.9 MG/DL (ref 8.6–10.5)
CHLORIDE SERPL-SCNC: 105 MMOL/L (ref 98–107)
CO2 SERPL-SCNC: 22.9 MMOL/L (ref 22–29)
CREAT SERPL-MCNC: 0.83 MG/DL (ref 0.57–1)
EGFRCR SERPLBLD CKD-EPI 2021: 72.7 ML/MIN/1.73
GLOBULIN UR ELPH-MCNC: 2.3 GM/DL
GLUCOSE SERPL-MCNC: 95 MG/DL (ref 65–99)
INR PPP: 0.97 (ref 0.86–1.15)
POTASSIUM SERPL-SCNC: 4.4 MMOL/L (ref 3.5–5.2)
PROT SERPL-MCNC: 6.7 G/DL (ref 6–8.5)
PROTHROMBIN TIME: 13 SECONDS (ref 11.8–14.9)
SODIUM SERPL-SCNC: 141 MMOL/L (ref 136–145)

## 2024-02-29 PROCEDURE — 84466 ASSAY OF TRANSFERRIN: CPT | Performed by: NURSE PRACTITIONER

## 2024-02-29 PROCEDURE — 82390 ASSAY OF CERULOPLASMIN: CPT | Performed by: NURSE PRACTITIONER

## 2024-02-29 PROCEDURE — G0439 PPPS, SUBSEQ VISIT: HCPCS

## 2024-02-29 PROCEDURE — 83540 ASSAY OF IRON: CPT | Performed by: NURSE PRACTITIONER

## 2024-02-29 PROCEDURE — 86038 ANTINUCLEAR ANTIBODIES: CPT | Performed by: NURSE PRACTITIONER

## 2024-02-29 PROCEDURE — 80053 COMPREHEN METABOLIC PANEL: CPT | Performed by: NURSE PRACTITIONER

## 2024-02-29 PROCEDURE — 85610 PROTHROMBIN TIME: CPT | Performed by: NURSE PRACTITIONER

## 2024-02-29 PROCEDURE — 1159F MED LIST DOCD IN RCRD: CPT

## 2024-02-29 PROCEDURE — 1170F FXNL STATUS ASSESSED: CPT

## 2024-02-29 PROCEDURE — 82784 ASSAY IGA/IGD/IGG/IGM EACH: CPT | Performed by: NURSE PRACTITIONER

## 2024-02-29 PROCEDURE — 82103 ALPHA-1-ANTITRYPSIN TOTAL: CPT | Performed by: NURSE PRACTITIONER

## 2024-02-29 PROCEDURE — 86334 IMMUNOFIX E-PHORESIS SERUM: CPT | Performed by: NURSE PRACTITIONER

## 2024-02-29 PROCEDURE — 1160F RVW MEDS BY RX/DR IN RCRD: CPT

## 2024-02-29 PROCEDURE — 36415 COLL VENOUS BLD VENIPUNCTURE: CPT | Performed by: NURSE PRACTITIONER

## 2024-02-29 PROCEDURE — 86225 DNA ANTIBODY NATIVE: CPT | Performed by: NURSE PRACTITIONER

## 2024-02-29 PROCEDURE — 86015 ACTIN ANTIBODY EACH: CPT | Performed by: NURSE PRACTITIONER

## 2024-02-29 RX ORDER — MAGNESIUM OXIDE 400 MG/1
400 TABLET ORAL DAILY
COMMUNITY

## 2024-02-29 NOTE — ASSESSMENT & PLAN NOTE
Elevated triglycerides but improved.  She is trying to change her dietary habits.  She remains on simvastatin and zetia  Continue current medication regimen.

## 2024-02-29 NOTE — ASSESSMENT & PLAN NOTE
Mild coronary artery atherosclerosis noted on CT  Patient denies any chest pain, palpitations, soa.  Continue with healthy diet, regular exercise, daily asa and antihyperlipidemics.

## 2024-02-29 NOTE — PROGRESS NOTES
The ABCs of the Annual Wellness Visit  Subsequent Medicare Wellness Visit    Radha Sandoval is a 78 y.o. female who presents for a Subsequent Medicare Wellness Visit.    The following portions of the patient's history were reviewed and   updated as appropriate: allergies, current medications, past family history, past medical history, past social history, past surgical history, and problem list.    Compared to one year ago, the patient feels her physical   health is worse. Patient has not been exercising as much as she had been. She plans to start doing so.     Compared to one year ago, the patient feels her mental   health is the same.    Recent Hospitalizations:  She was not admitted to the hospital during the last year.       Current Medical Providers:  Patient Care Team:  Sofy Branch APRN as PCP - General (Internal Medicine)  Tata Mendez APRN as Nurse Practitioner (Nurse Practitioner)    Outpatient Medications Prior to Visit   Medication Sig Dispense Refill    aspirin 81 MG EC tablet Take 1 tablet by mouth Daily. 99 tablet 99    ezetimibe (Zetia) 10 MG tablet Take 1 tablet by mouth Daily. 90 tablet 2    hydrocortisone 0.5 % cream Apply 1 application  topically to the appropriate area as directed 2 (Two) Times a Day. 15 g 1    magnesium oxide (MAG-OX) 400 MG tablet Take 1 tablet by mouth Daily.      montelukast (SINGULAIR) 10 MG tablet Take 1 tablet by mouth Daily. 90 tablet 3    simvastatin (ZOCOR) 40 MG tablet Take 1 tablet by mouth every night at bedtime. 90 tablet 3    vitamin D (ERGOCALCIFEROL) 1.25 MG (37144 UT) capsule capsule Take 1 capsule by mouth Every 7 (Seven) Days. 12 capsule 3     No facility-administered medications prior to visit.       No opioid medication identified on active medication list. I have reviewed chart for other potential  high risk medication/s and harmful drug interactions in the elderly.        Aspirin is on active medication list. Aspirin use  "is indicated based on review of current medical condition/s. Pros and cons of this therapy have been discussed today. Benefits of this medication outweigh potential harm.  Patient has been encouraged to continue taking this medication.  .      Patient Active Problem List   Diagnosis    Acute pancreatitis    Histoplasmosis    Ovarian cyst    Asthma    Hyperlipidemia    Acne rosacea    Diverticulitis    Bronchospasm    Colon abnormality    Seasonal allergic rhinitis    Osteopenia of hip    Chronic pain of both shoulders    Myalgia due to statin    Acute URI    Coronary atherosclerosis due to lipid rich plaque    Hypermagnesemia    Primary osteoarthritis of left wrist    Vitamin D deficiency    Elevated ferritin    Irritant contact dermatitis due to cosmetics    Hepatic steatosis     Advance Care Planning   Advance Care Planning     Advance Directive is on file.  ACP discussion was held with the patient during this visit. Patient has an advance directive in EMR which is still valid.      Objective    Vitals:    02/29/24 1315   BP: 117/69   Pulse: 68   Temp: 98.2 °F (36.8 °C)   TempSrc: Temporal   SpO2: 95%   Weight: 78 kg (172 lb)   Height: 165.1 cm (65\")     Estimated body mass index is 28.62 kg/m² as calculated from the following:    Height as of this encounter: 165.1 cm (65\").    Weight as of this encounter: 78 kg (172 lb).    BMI is >= 25 and <30. (Overweight) The following options were offered after discussion;: exercise counseling/recommendations and nutrition counseling/recommendations      Does the patient have evidence of cognitive impairment? No    Lab Results   Component Value Date    TRIG 177 (H) 02/14/2024    HDL 41 02/14/2024    LDL 55 02/14/2024    VLDL 29 02/14/2024        HEALTH RISK ASSESSMENT    Smoking Status:  Social History     Tobacco Use   Smoking Status Never   Smokeless Tobacco Never     Alcohol Consumption:  Social History     Substance and Sexual Activity   Alcohol Use Yes    Alcohol/week: " 1.0 standard drink of alcohol    Types: 1 Glasses of wine per week    Comment: seldom, rarely     Fall Risk Screen:    ANGEL Fall Risk Assessment was completed, and patient is at LOW risk for falls.Assessment completed on:2024    Depression Screenin/29/2024     1:14 PM   PHQ-2/PHQ-9 Depression Screening   Little Interest or Pleasure in Doing Things 0-->not at all   Feeling Down, Depressed or Hopeless 0-->not at all   PHQ-9: Brief Depression Severity Measure Score 0       Health Habits and Functional and Cognitive Screenin/29/2024     1:14 PM   Functional & Cognitive Status   Do you have difficulty preparing food and eating? No   Do you have difficulty bathing yourself, getting dressed or grooming yourself? No   Do you have difficulty using the toilet? No   Do you have difficulty moving around from place to place? No   Do you have trouble with steps or getting out of a bed or a chair? No   Current Diet Well Balanced Diet   Dental Exam Up to date   Eye Exam Up to date   Exercise (times per week) 3 times per week   Current Exercises Include Walking   Do you need help using the phone?  No   Are you deaf or do you have serious difficulty hearing?  No   Do you need help to go to places out of walking distance? No   Do you need help shopping? No   Do you need help preparing meals?  No   Do you need help with housework?  No   Do you need help with laundry? No   Do you need help taking your medications? No   Do you need help managing money? No   Do you ever drive or ride in a car without wearing a seat belt? No   Have you felt unusual stress, anger or loneliness in the last month? No   Who do you live with? Spouse   If you need help, do you have trouble finding someone available to you? No   Have you been bothered in the last four weeks by sexual problems? No   Do you have difficulty concentrating, remembering or making decisions? No       Age-appropriate Screening Schedule:  Refer to the list below  for future screening recommendations based on patient's age, sex and/or medical conditions. Orders for these recommended tests are listed in the plan section. The patient has been provided with a written plan.    Health Maintenance   Topic Date Due    RSV Vaccine - Adults (1 - 1-dose 60+ series) Never done    COVID-19 Vaccine (7 - 2023-24 season) 12/31/2024 (Originally 12/8/2023)    LIPID PANEL  02/14/2025    ANNUAL WELLNESS VISIT  02/28/2025    BMI FOLLOWUP  02/28/2025    DXA SCAN  07/21/2025    COLORECTAL CANCER SCREENING  06/18/2028    TDAP/TD VACCINES (4 - Td or Tdap) 09/25/2029    HEPATITIS C SCREENING  Completed    INFLUENZA VACCINE  Completed    Pneumococcal Vaccine 65+  Completed    ZOSTER VACCINE  Completed       Pt believes she has had at [facility].         CMS Preventative Services Quick Reference  Risk Factors Identified During Encounter  Immunizations Discussed/Encouraged: Tdap, Influenza, Pneumococcal 23, Prevnar 20 (Pneumococcal 20-valent conjugate), Shingrix, COVID19, and RSV (Respiratory Syncytial Virus)  Dental Screening Recommended  Vision Screening Recommended  The above risks/problems have been discussed with the patient.  Pertinent information has been shared with the patient in the After Visit Summary.  An After Visit Summary and PPPS were made available to the patient.    Follow Up:   Next Medicare Wellness visit to be scheduled in 1 year.       Additional E&M Note during same encounter follows:  Patient has multiple medical problems which are significant and separately identifiable that require additional work above and beyond the Medicare Wellness Visit.      Chief Complaint  Follow-up and Medicare Wellness-subsequent    Subjective        HPI  Maryam Sandoavl is also being seen today for follow up on chronic disease management and some concerns.    Patient states that she went to gastro and does have fatty liver.     Patient complains of right palm pain in her thumb joint. She states  "that this is chronic. She denies any known injury.     She denies any chest pain, soa, palpitations, nausea, vomiting, diarrhea, changes to bowel/bladder habits.         Objective   Vital Signs:  /69   Pulse 68   Temp 98.2 °F (36.8 °C) (Temporal)   Ht 165.1 cm (65\")   Wt 78 kg (172 lb)   SpO2 95%   BMI 28.62 kg/m²     Physical Exam  Vitals and nursing note reviewed.   Constitutional:       Appearance: Normal appearance.   HENT:      Head: Normocephalic.   Eyes:      Extraocular Movements: Extraocular movements intact.      Conjunctiva/sclera: Conjunctivae normal.   Cardiovascular:      Rate and Rhythm: Normal rate and regular rhythm.      Heart sounds: Normal heart sounds. No murmur heard.  Pulmonary:      Effort: Pulmonary effort is normal.      Breath sounds: Normal breath sounds. No wheezing or rales.   Abdominal:      General: Bowel sounds are normal.      Palpations: Abdomen is soft.   Musculoskeletal:         General: No swelling. Normal range of motion.      Cervical back: Normal range of motion and neck supple.   Skin:     General: Skin is warm and dry.   Neurological:      General: No focal deficit present.      Mental Status: She is alert and oriented to person, place, and time. Mental status is at baseline.   Psychiatric:         Mood and Affect: Mood normal.         Behavior: Behavior normal.         Thought Content: Thought content normal.         Judgment: Judgment normal.          The following data was reviewed by: CHEN Mcintosh on 02/29/2024:  CMP          8/14/2023    09:47 11/20/2023    09:36 2/14/2024    09:08   CMP   Glucose 89  95  90    BUN 16  17  19    Creatinine 0.70  0.85  0.75    EGFR 89.8  70.7  82.1    Sodium 142  141  144    Potassium 4.4  4.4  4.0    Chloride 106  104  107    Calcium 10.3  10.4  10.0    Total Protein 6.8  6.9  6.7    Albumin 4.8  4.9  4.8    Globulin 2.0  2.0  1.9    Total Bilirubin 0.4  0.6  0.5    Alkaline Phosphatase 174  166     165  148    AST " (SGOT) 24  23  17    ALT (SGPT) 26  27  20    Albumin/Globulin Ratio 2.4  2.5  2.5    BUN/Creatinine Ratio 22.9  20.0  25.3    Anion Gap 8.0  9.0  10.0      CBC          8/14/2023    09:47 11/20/2023    09:36 2/14/2024    09:08   CBC   WBC 8.07  8.10  7.68    RBC 4.68  4.69  4.47    Hemoglobin 14.7  15.1  14.2    Hematocrit 43.4  43.2  41.5    MCV 92.7  92.1  92.8    MCH 31.4  32.2  31.8    MCHC 33.9  35.0  34.2    RDW 12.0  12.1  12.3    Platelets 206  213  175      CBC w/diff          8/14/2023    09:47 11/20/2023    09:36 2/14/2024    09:08   CBC w/Diff   WBC 8.07  8.10  7.68    RBC 4.68  4.69  4.47    Hemoglobin 14.7  15.1  14.2    Hematocrit 43.4  43.2  41.5    MCV 92.7  92.1  92.8    MCH 31.4  32.2  31.8    MCHC 33.9  35.0  34.2    RDW 12.0  12.1  12.3    Platelets 206  213  175    Neutrophil Rel % 55.8  58.6  56.3    Immature Granulocyte Rel % 0.4  0.2  0.4    Lymphocyte Rel % 31.2  29.0  30.3    Monocyte Rel % 10.3  9.5  10.4    Eosinophil Rel % 1.7  2.0  1.8    Basophil Rel % 0.6  0.7  0.8      Lipid Panel          8/14/2023    09:47 11/20/2023    09:36 2/14/2024    09:08   Lipid Panel   Total Cholesterol 128  128  125    Triglycerides 196  180  177    HDL Cholesterol 38  39  41    VLDL Cholesterol 32  30  29    LDL Cholesterol  58  59  55    LDL/HDL Ratio 1.34  1.36  1.19      TSH          8/14/2023    09:47 11/20/2023    09:36 2/14/2024    09:08   TSH   TSH 1.620  1.350  1.130        UA          11/20/2023    09:49   Urinalysis   Squamous Epithelial Cells, UA 0-2    Specific Gravity, UA 1.021    Ketones, UA Negative    Blood, UA Negative    Leukocytes, UA Negative    Nitrite, UA Negative    RBC, UA 0-2    WBC, UA 0-2    Bacteria, UA None Seen                 Assessment and Plan   Diagnoses and all orders for this visit:    1. Encounter for subsequent annual wellness visit (AWV) in Medicare patient (Primary)    2. Hepatic steatosis  Assessment & Plan:  Recommend healthy diet, regular exercise.  F/u with GI  as scheduled.     Orders:  -     CBC & Differential; Future  -     Comprehensive Metabolic Panel; Future  -     Lipid Panel; Future  -     TSH Rfx On Abnormal To Free T4; Future  -     Vitamin B12 & Folate; Future    3. Mixed hyperlipidemia  Assessment & Plan:  Elevated triglycerides but improved.  She is trying to change her dietary habits.  She remains on simvastatin and zetia  Continue current medication regimen.     Orders:  -     CBC & Differential; Future  -     Comprehensive Metabolic Panel; Future  -     Lipid Panel; Future  -     TSH Rfx On Abnormal To Free T4; Future  -     Vitamin B12 & Folate; Future    4. Vitamin D deficiency  Assessment & Plan:  Stable   Continue with current medication regimen.     Orders:  -     Vitamin D,25-Hydroxy; Future    5. Coronary atherosclerosis due to lipid rich plaque  Assessment & Plan:  Mild coronary artery atherosclerosis noted on CT  Patient denies any chest pain, palpitations, soa.  Continue with healthy diet, regular exercise, daily asa and antihyperlipidemics.     Orders:  -     CBC & Differential; Future  -     Comprehensive Metabolic Panel; Future  -     Lipid Panel; Future    6. Mild intermittent asthma without complication  Assessment & Plan:  History of asthma  Allergies and asthma are stable at this time.  She is on Singulair qhs.  Continue current regimen.             7. Elevated liver function tests  -     Iron Profile  -     Immunofixation, Serum  -     Protime-INR    8. Abnormal results of liver function studies  -     Iron Profile  -     Immunofixation, Serum  -     Protime-INR    9. Abnormal levels of other serum enzymes  -     Iron Profile  -     Immunofixation, Serum  -     Protime-INR    10. Elevation of levels of liver transaminase levels  -     Iron Profile  -     Immunofixation, Serum  -     Protime-INR             Follow Up   Return in about 6 months (around 8/29/2024).  Patient was given instructions and counseling regarding her condition or for  health maintenance advice. Please see specific information pulled into the AVS if appropriate.

## 2024-02-29 NOTE — ASSESSMENT & PLAN NOTE
History of asthma  Allergies and asthma are stable at this time.  She is on Singulair qhs.  Continue current regimen.

## 2024-03-01 LAB
ALPHA1 GLOB MFR UR ELPH: 125 MG/DL (ref 90–200)
CERULOPLASMIN SERPL-MCNC: 20 MG/DL (ref 19–39)
IRON 24H UR-MRATE: 92 MCG/DL (ref 37–145)
IRON SATN MFR SERPL: 24 % (ref 20–50)
TIBC SERPL-MCNC: 383 MCG/DL (ref 298–536)
TRANSFERRIN SERPL-MCNC: 257 MG/DL (ref 200–360)

## 2024-03-02 LAB — SMA IGG SER-ACNC: 3 UNITS (ref 0–19)

## 2024-03-04 LAB
ANA SER QL: POSITIVE
DSDNA AB SER-ACNC: <1 IU/ML (ref 0–9)
IGA SERPL-MCNC: 154 MG/DL (ref 64–422)
IGG SERPL-MCNC: 632 MG/DL (ref 586–1602)
IGM SERPL-MCNC: 87 MG/DL (ref 26–217)
Lab: NORMAL
PROT PATTERN SERPL IFE-IMP: NORMAL

## 2024-03-11 ENCOUNTER — TELEPHONE (OUTPATIENT)
Dept: GASTROENTEROLOGY | Facility: CLINIC | Age: 79
End: 2024-03-11
Payer: MEDICARE

## 2024-03-11 NOTE — TELEPHONE ENCOUNTER
----- Message from CHEN Grace sent at 3/11/2024  9:39 AM EDT -----  Liver serology looks good.  Alkaline phosphatase is still elevated but about the same as previous.

## 2024-03-25 ENCOUNTER — TRANSCRIBE ORDERS (OUTPATIENT)
Dept: ADMINISTRATIVE | Facility: HOSPITAL | Age: 79
End: 2024-03-25
Payer: MEDICARE

## 2024-03-25 DIAGNOSIS — Z12.31 SCREENING MAMMOGRAM FOR BREAST CANCER: Primary | ICD-10-CM

## 2024-03-27 ENCOUNTER — OFFICE VISIT (OUTPATIENT)
Dept: GASTROENTEROLOGY | Facility: CLINIC | Age: 79
End: 2024-03-27
Payer: MEDICARE

## 2024-03-27 VITALS
WEIGHT: 184 LBS | DIASTOLIC BLOOD PRESSURE: 52 MMHG | SYSTOLIC BLOOD PRESSURE: 112 MMHG | HEART RATE: 71 BPM | HEIGHT: 65 IN | BODY MASS INDEX: 30.66 KG/M2

## 2024-03-27 DIAGNOSIS — R15.0 INCOMPLETE DEFECATION: ICD-10-CM

## 2024-03-27 DIAGNOSIS — R74.8 ELEVATED LIVER ENZYMES: Primary | ICD-10-CM

## 2024-03-27 DIAGNOSIS — K76.0 FATTY LIVER: ICD-10-CM

## 2024-03-27 PROCEDURE — 1160F RVW MEDS BY RX/DR IN RCRD: CPT | Performed by: NURSE PRACTITIONER

## 2024-03-27 PROCEDURE — 1159F MED LIST DOCD IN RCRD: CPT | Performed by: NURSE PRACTITIONER

## 2024-03-27 PROCEDURE — 99214 OFFICE O/P EST MOD 30 MIN: CPT | Performed by: NURSE PRACTITIONER

## 2024-03-27 NOTE — PROGRESS NOTES
Chief Complaint   Results    History of Present Illness       Maryam Sandoval is a 78 y.o. female who presents to [facility] for follow-up for elevated liver enzymes. She was last seen in the office by me on 1/24/24.     Most recent labs show an elevated alkaline phosphatase at 165 this past November.  She had an MRCP done in July 2022 that showed near resolution of the focal pancreatitis involving the pancreatic tail.  No intrahepatic or extrahepatic biliary dilatation patient status postcholecystectomy.  Liver appeared normal at that time.  She did have a right hepatic cyst noted.  She had a CT scan of the abdomen and pelvis done in July 2022 that showed a small hiatal hernia.  Positive for pancreatitis.     Last EGD/colonoscopy----Admits she has had several colonoscopies in the past. She does have hx colon polyps.      She admits her bowels can be loose depending on what she eats. She has been diagnosed IBS in the past. Denies any rectal bleeding or melena.      She denies any reflux or dysphagia. Good appetite.         GI family history----denies any     Most recent LFTs look good except for a slightly elevated alkaline phosphatase at 171 as of 2/29/2024.  Liver serology showed an elevated AURORA.  The rest of the liver serology was normal.  GGT was normal at 16.  Liver ultrasound did show hepatic steatosis.  Prior cholecystectomy.  No biliary dilatation. She is still having loose stools and admits she doesn't feel like she empties all the way every time she has BM. She is already on Vitamin E. She denies any ETOH. She denies any NSAIDs. She does tylenol PRN.      Results       Result Review :       CMP          11/20/2023    09:36 2/14/2024    09:08 2/29/2024    14:05   CMP   Glucose 95  90  95    BUN 17  19  18    Creatinine 0.85  0.75  0.83    EGFR 70.7  82.1  72.7    Sodium 141  144  141    Potassium 4.4  4.0  4.4    Chloride 104  107  105    Calcium 10.4  10.0  9.9   "  Total Protein 6.9  6.7  6.7    Albumin 4.9  4.8  4.4    Globulin 2.0  1.9  2.3    Total Bilirubin 0.6  0.5  0.3    Alkaline Phosphatase 166     165  148  171    AST (SGOT) 23  17  20    ALT (SGPT) 27  20  21    Albumin/Globulin Ratio 2.5  2.5  1.9    BUN/Creatinine Ratio 20.0  25.3  21.7    Anion Gap 9.0  10.0  13.1      CBC          8/14/2023    09:47 11/20/2023    09:36 2/14/2024    09:08   CBC   WBC 8.07  8.10  7.68    RBC 4.68  4.69  4.47    Hemoglobin 14.7  15.1  14.2    Hematocrit 43.4  43.2  41.5    MCV 92.7  92.1  92.8    MCH 31.4  32.2  31.8    MCHC 33.9  35.0  34.2    RDW 12.0  12.1  12.3    Platelets 206  213  175      Lipid Panel          8/14/2023    09:47 11/20/2023    09:36 2/14/2024    09:08   Lipid Panel   Total Cholesterol 128  128  125    Triglycerides 196  180  177    HDL Cholesterol 38  39  41    VLDL Cholesterol 32  30  29    LDL Cholesterol  58  59  55    LDL/HDL Ratio 1.34  1.36  1.19      TSH          8/14/2023    09:47 11/20/2023    09:36 2/14/2024    09:08   TSH   TSH 1.620  1.350  1.130        Lipase   Lipase   Date Value Ref Range Status   07/15/2022 36 10 - 50 Units/Liter Final     Amylase No results found for: \"AMYLASE\"  Iron Profile   Iron   Date Value Ref Range Status   02/29/2024 92 37 - 145 mcg/dL Final     TIBC   Date Value Ref Range Status   02/29/2024 383 298 - 536 mcg/dL Final     Iron Saturation (TSAT)   Date Value Ref Range Status   02/29/2024 24 20 - 50 % Final     Transferrin   Date Value Ref Range Status   02/29/2024 257 200 - 360 mg/dL Final     Ferritin   Ferritin   Date Value Ref Range Status   11/20/2023 373.00 (H) 13.00 - 150.00 ng/mL Final     Liver Workup   ALPHA -1 ANTITRYPSIN   Date Value Ref Range Status   02/29/2024 125 90 - 200 mg/dL Final     Smooth Muscle Ab   Date Value Ref Range Status   02/29/2024 3 0 - 19 Units Final     Comment:                      Negative                     0 - 19                   Weak positive               20 - 30                "    Moderate to strong positive     >30   Actin Antibodies are found in 52-85% of patients with   autoimmune hepatitis or chronic active hepatitis and   in 22% of patients with primary biliary cirrhosis.     Ceruloplasmin   Date Value Ref Range Status   02/29/2024 20 19 - 39 mg/dL Final     Ferritin   Date Value Ref Range Status   11/20/2023 373.00 (H) 13.00 - 150.00 ng/mL Final     Immunofixation Result, Serum   Date Value Ref Range Status   02/29/2024 Comment  Final     Comment:     No monoclonality detected.     IgG   Date Value Ref Range Status   02/29/2024 632 586 - 1602 mg/dL Final     IgA   Date Value Ref Range Status   02/29/2024 154 64 - 422 mg/dL Final     IgM   Date Value Ref Range Status   02/29/2024 87 26 - 217 mg/dL Final     Iron   Date Value Ref Range Status   02/29/2024 92 37 - 145 mcg/dL Final     TIBC   Date Value Ref Range Status   02/29/2024 383 298 - 536 mcg/dL Final     Iron Saturation (TSAT)   Date Value Ref Range Status   02/29/2024 24 20 - 50 % Final     Transferrin   Date Value Ref Range Status   02/29/2024 257 200 - 360 mg/dL Final     Mitochondrial Ab   Date Value Ref Range Status   02/14/2024 <20.0 0.0 - 20.0 Units Final     Comment:                                     Negative    0.0 - 20.0                                  Equivocal  20.1 - 24.9                                  Positive         >24.9  Mitochondrial (M2) Antibodies are found in 90-96% of  patients with primary biliary cirrhosis.     Protime   Date Value Ref Range Status   02/29/2024 13.0 11.8 - 14.9 Seconds Final     INR   Date Value Ref Range Status   02/29/2024 0.97 0.86 - 1.15 Final     GGT   GGT   Date Value Ref Range Status   02/14/2024 16 5 - 36 U/L Final                Past Medical History       Past Medical History:   Diagnosis Date    Acne rosacea 11/02/2021    Arthritis     Asthma 11/02/2021    Asthma episode with extreme mold exposure     Bronchospasm 11/02/2021    Cholelithiasis     Colon polyp      Diverticulitis 11/02/2021 8/2005. 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    Hyperlipidemia 11/02/2021    Irritable bowel syndrome     Ovarian cyst 11/02/2021 1993    Pancreatitis two episodes    Ulcerative colitis        Past Surgical History:   Procedure Laterality Date    APPENDECTOMY      BLEPHAROPLASTY Bilateral 12/19/2017    Procedure: BILATERAL  BLEPHAROPLASTY LOWER LID ;  Surgeon: Zachery Fermin MD;  Location: [facility] [place];  Service:     COLON RESECTION      COLONOSCOPY      ENTROPIAN REPAIR Bilateral 12/19/2017    Procedure: RIGHT LOWER LID ENTROPION REPAIR, LEFT LOWER LID ECTROPION REPAIR;  Surgeon: Zachery Fermin MD;  Location: [facility] [place];  Service:     HERNIA REPAIR      JOINT REPLACEMENT      KNEE ARTHROPLASTY Right     LAPAROSCOPIC CHOLECYSTECTOMY           Current Outpatient Medications:     aspirin 81 MG EC tablet, Take 1 tablet by mouth Daily., Disp: 99 tablet, Rfl: 99    ezetimibe (Zetia) 10 MG tablet, Take 1 tablet by mouth Daily., Disp: 90 tablet, Rfl: 2    hydrocortisone 0.5 % cream, Apply 1 application  topically to the appropriate area as directed 2 (Two) Times a Day., Disp: 15 g, Rfl: 1    magnesium oxide (MAG-OX) 400 MG tablet, Take 1 tablet by mouth Daily., Disp: , Rfl:     montelukast (SINGULAIR) 10 MG tablet, Take 1 tablet by mouth Daily., Disp: 90 tablet, Rfl: 3    simvastatin (ZOCOR) 40 MG tablet, Take 1 tablet by mouth every night at bedtime., Disp: 90 tablet, Rfl: 3    vitamin D (ERGOCALCIFEROL) 1.25 MG (30928 UT) capsule capsule, Take 1 capsule by mouth Every 7 (Seven) Days., Disp: 12 capsule, Rfl: 3     Allergies   Allergen Reactions    Cipro [Ciprofloxacin Hcl] GI Intolerance    Flagyl [Metronidazole] GI Intolerance       Family History   Problem Relation Age of Onset    Irritable bowel syndrome Mother     Cirrhosis Father     Alcohol abuse Father     Malig Hyperthermia Neg Hx         Social History  "    Social History Narrative    Not on file       Objective       Review of Systems   Constitutional:  Negative for appetite change, fatigue, fever, unexpected weight gain and unexpected weight loss.   HENT:  Negative for trouble swallowing.    Respiratory:  Negative for cough, choking, chest tightness, shortness of breath, wheezing and stridor.    Cardiovascular:  Negative for chest pain, palpitations and leg swelling.   Gastrointestinal:  Negative for abdominal distention, abdominal pain, anal bleeding, blood in stool, constipation, diarrhea, nausea, rectal pain, vomiting, GERD and indigestion.        Vital Signs:   /52 (BP Location: Left arm, Patient Position: Sitting, Cuff Size: Small Adult)   Pulse 71   Ht 165.1 cm (65\")   Wt 83.5 kg (184 lb)   BMI 30.62 kg/m²       Physical Exam  Constitutional:       General: She is not in acute distress.     Appearance: She is well-developed. She is not ill-appearing.   HENT:      Head: Normocephalic.   Eyes:      Pupils: Pupils are equal, round, and reactive to light.   Cardiovascular:      Rate and Rhythm: Normal rate and regular rhythm.      Heart sounds: Normal heart sounds.   Pulmonary:      Effort: Pulmonary effort is normal.      Breath sounds: Normal breath sounds.   Abdominal:      General: Bowel sounds are normal. There is no distension.      Palpations: Abdomen is soft. There is no mass.      Tenderness: There is no abdominal tenderness. There is no guarding or rebound.      Hernia: No hernia is present.   Musculoskeletal:         General: Normal range of motion.   Skin:     General: Skin is warm and dry.   Neurological:      Mental Status: She is alert and oriented to person, place, and time.   Psychiatric:         Speech: Speech normal.         Behavior: Behavior normal.         Judgment: Judgment normal.           Assessment & Plan          Assessment and Plan    Diagnoses and all orders for this visit:    1. Elevated liver enzymes (Primary)    2. " Fatty liver    3. Incomplete defecation      Reviewed all labs and imaging with her today. Liver serology showed elevated AURORA but otherwise normal including normal GGT. Liver US + fatty liver. We did discuss fatty liver disease and she can continue VITAMIN E. Continue to avoid ETOH and NSAIDs. Recommend daily fiber supplement to help with incomplete defecation. Recommend high fiber diet. Patient to call the office with any issues. Patient to follow up with me in 6 months. Patient is agreeable to the plan.           Follow Up       Follow Up   Return in about 6 months (around 9/27/2024) for FATTY LIVER.  Patient was given instructions and counseling regarding her condition or for health maintenance advice. Please see specific information pulled into the AVS if appropriate.

## 2024-05-23 ENCOUNTER — HOSPITAL ENCOUNTER (OUTPATIENT)
Dept: MAMMOGRAPHY | Facility: HOSPITAL | Age: 79
Discharge: HOME | End: 2024-05-23
Payer: MEDICARE

## 2024-05-23 DIAGNOSIS — Z12.31 SCREENING MAMMOGRAM FOR BREAST CANCER: ICD-10-CM

## 2024-05-23 PROCEDURE — 77067 SCR MAMMO BI INCL CAD: CPT

## 2024-05-23 PROCEDURE — 77063 BREAST TOMOSYNTHESIS BI: CPT

## 2024-06-26 RX ORDER — EZETIMIBE 10 MG/1
10 TABLET ORAL DAILY
Qty: 90 TABLET | Refills: 2 | Status: SHIPPED | OUTPATIENT
Start: 2024-06-26

## 2024-06-26 NOTE — TELEPHONE ENCOUNTER
Caller: Maryam Sandoval    Relationship: Self    Best call back number: [phone]    Requested Prescriptions:   Requested Prescriptions     Pending Prescriptions Disp Refills    ezetimibe (Zetia) 10 MG tablet 90 tablet 2     Sig: Take 1 tablet by mouth Daily.        Pharmacy where request should be sent: [id number] - [place], [place] - [address] AT [facility] [address]  - [phone] [facility] [phone] FX     Last office visit with prescribing clinician: 2/29/2024   Last telemedicine visit with prescribing clinician: Visit date not found   Next office visit with prescribing clinician: 9/6/2024     Additional details provided by patient: PATIENT WOULD LIKE REFILL AS SHE HAS ABOUT A WEEK LEFT AND SHE WOULD LIKE TO HAVE THIS MEDICATION TRANSFERRED TO [facility] FROM NOW ON.     Does the patient have less than a 3 day supply:  [] Yes  [x] No    Would you like a call back once the refill request has been completed: [] Yes [x] No    If the office needs to give you a call back, can they leave a voicemail: [] Yes [x] No    Mehdi Harrison Rep   06/26/24 09:30 EDT

## 2024-08-29 ENCOUNTER — LAB (OUTPATIENT)
Dept: INTERNAL MEDICINE | Age: 79
End: 2024-08-29
Payer: MEDICARE

## 2024-08-29 DIAGNOSIS — E78.2 MIXED HYPERLIPIDEMIA: ICD-10-CM

## 2024-08-29 DIAGNOSIS — K76.0 HEPATIC STEATOSIS: ICD-10-CM

## 2024-08-29 DIAGNOSIS — E55.9 VITAMIN D DEFICIENCY: ICD-10-CM

## 2024-08-29 DIAGNOSIS — I25.83 CORONARY ATHEROSCLEROSIS DUE TO LIPID RICH PLAQUE: ICD-10-CM

## 2024-08-29 DIAGNOSIS — I25.10 CORONARY ATHEROSCLEROSIS DUE TO LIPID RICH PLAQUE: ICD-10-CM

## 2024-08-29 LAB
25(OH)D3 SERPL-MCNC: 32.2 NG/ML (ref 30–100)
ALBUMIN SERPL-MCNC: 4.5 G/DL (ref 3.5–5.2)
ALBUMIN/GLOB SERPL: 1.8 G/DL
ALP SERPL-CCNC: 142 U/L (ref 39–117)
ALT SERPL W P-5'-P-CCNC: 24 U/L (ref 1–33)
ANION GAP SERPL CALCULATED.3IONS-SCNC: 9.6 MMOL/L (ref 5–15)
AST SERPL-CCNC: 24 U/L (ref 1–32)
BASOPHILS # BLD AUTO: 0.05 10*3/MM3 (ref 0–0.2)
BASOPHILS NFR BLD AUTO: 0.7 % (ref 0–1.5)
BILIRUB SERPL-MCNC: 0.6 MG/DL (ref 0–1.2)
BUN SERPL-MCNC: 13 MG/DL (ref 8–23)
BUN/CREAT SERPL: 16 (ref 7–25)
CALCIUM SPEC-SCNC: 10.2 MG/DL (ref 8.6–10.5)
CHLORIDE SERPL-SCNC: 106 MMOL/L (ref 98–107)
CHOLEST SERPL-MCNC: 140 MG/DL (ref 0–200)
CO2 SERPL-SCNC: 25.4 MMOL/L (ref 22–29)
CREAT SERPL-MCNC: 0.81 MG/DL (ref 0.57–1)
DEPRECATED RDW RBC AUTO: 43.1 FL (ref 37–54)
EGFRCR SERPLBLD CKD-EPI 2021: 74.9 ML/MIN/1.73
EOSINOPHIL # BLD AUTO: 0.13 10*3/MM3 (ref 0–0.4)
EOSINOPHIL NFR BLD AUTO: 1.8 % (ref 0.3–6.2)
ERYTHROCYTE [DISTWIDTH] IN BLOOD BY AUTOMATED COUNT: 12.7 % (ref 12.3–15.4)
FOLATE SERPL-MCNC: >20 NG/ML (ref 4.78–24.2)
GLOBULIN UR ELPH-MCNC: 2.5 GM/DL
GLUCOSE SERPL-MCNC: 85 MG/DL (ref 65–99)
HCT VFR BLD AUTO: 43.1 % (ref 34–46.6)
HDLC SERPL-MCNC: 36 MG/DL (ref 40–60)
HGB BLD-MCNC: 14.8 G/DL (ref 12–15.9)
IMM GRANULOCYTES # BLD AUTO: 0.02 10*3/MM3 (ref 0–0.05)
IMM GRANULOCYTES NFR BLD AUTO: 0.3 % (ref 0–0.5)
LDLC SERPL CALC-MCNC: 70 MG/DL (ref 0–100)
LDLC/HDLC SERPL: 1.77 {RATIO}
LYMPHOCYTES # BLD AUTO: 2.05 10*3/MM3 (ref 0.7–3.1)
LYMPHOCYTES NFR BLD AUTO: 28.8 % (ref 19.6–45.3)
MCH RBC QN AUTO: 32.3 PG (ref 26.6–33)
MCHC RBC AUTO-ENTMCNC: 34.3 G/DL (ref 31.5–35.7)
MCV RBC AUTO: 94.1 FL (ref 79–97)
MONOCYTES # BLD AUTO: 0.69 10*3/MM3 (ref 0.1–0.9)
MONOCYTES NFR BLD AUTO: 9.7 % (ref 5–12)
NEUTROPHILS NFR BLD AUTO: 4.18 10*3/MM3 (ref 1.7–7)
NEUTROPHILS NFR BLD AUTO: 58.7 % (ref 42.7–76)
NRBC BLD AUTO-RTO: 0 /100 WBC (ref 0–0.2)
PLATELET # BLD AUTO: 205 10*3/MM3 (ref 140–450)
PMV BLD AUTO: 8.5 FL (ref 6–12)
POTASSIUM SERPL-SCNC: 4.5 MMOL/L (ref 3.5–5.2)
PROT SERPL-MCNC: 7 G/DL (ref 6–8.5)
RBC # BLD AUTO: 4.58 10*6/MM3 (ref 3.77–5.28)
SODIUM SERPL-SCNC: 141 MMOL/L (ref 136–145)
TRIGL SERPL-MCNC: 202 MG/DL (ref 0–150)
TSH SERPL DL<=0.05 MIU/L-ACNC: 1.06 UIU/ML (ref 0.27–4.2)
VIT B12 BLD-MCNC: 748 PG/ML (ref 211–946)
VLDLC SERPL-MCNC: 34 MG/DL (ref 5–40)
WBC NRBC COR # BLD AUTO: 7.12 10*3/MM3 (ref 3.4–10.8)

## 2024-08-29 PROCEDURE — 36415 COLL VENOUS BLD VENIPUNCTURE: CPT

## 2024-08-29 PROCEDURE — 82306 VITAMIN D 25 HYDROXY: CPT

## 2024-08-29 PROCEDURE — 84443 ASSAY THYROID STIM HORMONE: CPT

## 2024-08-29 PROCEDURE — 85025 COMPLETE CBC W/AUTO DIFF WBC: CPT

## 2024-08-29 PROCEDURE — 80053 COMPREHEN METABOLIC PANEL: CPT

## 2024-08-29 PROCEDURE — 82607 VITAMIN B-12: CPT

## 2024-08-29 PROCEDURE — 82746 ASSAY OF FOLIC ACID SERUM: CPT

## 2024-08-29 PROCEDURE — 80061 LIPID PANEL: CPT

## 2024-09-06 ENCOUNTER — OFFICE VISIT (OUTPATIENT)
Dept: INTERNAL MEDICINE | Age: 79
End: 2024-09-06
Payer: MEDICARE

## 2024-09-06 VITALS
BODY MASS INDEX: 28.56 KG/M2 | DIASTOLIC BLOOD PRESSURE: 60 MMHG | HEART RATE: 93 BPM | WEIGHT: 171.4 LBS | RESPIRATION RATE: 18 BRPM | TEMPERATURE: 97.1 F | OXYGEN SATURATION: 95 % | SYSTOLIC BLOOD PRESSURE: 122 MMHG | HEIGHT: 65 IN

## 2024-09-06 DIAGNOSIS — R79.89 ELEVATED FERRITIN: ICD-10-CM

## 2024-09-06 DIAGNOSIS — I25.10 CORONARY ATHEROSCLEROSIS DUE TO LIPID RICH PLAQUE: ICD-10-CM

## 2024-09-06 DIAGNOSIS — E55.9 VITAMIN D DEFICIENCY: ICD-10-CM

## 2024-09-06 DIAGNOSIS — I25.83 CORONARY ATHEROSCLEROSIS DUE TO LIPID RICH PLAQUE: ICD-10-CM

## 2024-09-06 DIAGNOSIS — K76.0 HEPATIC STEATOSIS: ICD-10-CM

## 2024-09-06 DIAGNOSIS — E78.2 MIXED HYPERLIPIDEMIA: Primary | ICD-10-CM

## 2024-09-06 PROCEDURE — 99214 OFFICE O/P EST MOD 30 MIN: CPT

## 2024-09-06 PROCEDURE — 1159F MED LIST DOCD IN RCRD: CPT

## 2024-09-06 PROCEDURE — 1160F RVW MEDS BY RX/DR IN RCRD: CPT

## 2024-09-06 NOTE — PROGRESS NOTES
Chief Complaint  Hyperlipidemia (78 year old female here today for her 6 month follow up. States she would like to discuss recent labs and also go over her referral that you put in last time for her fatty liver. )    Hyperlipidemia      SUBJECTIVE  Maryam Sandoval presents to [facility] follow up on chronic disease management.    She is doing well overall.     She does admit to fatigue.    She denies any chest pain, soa, palpitations, nausea, vomiting, diarrhea, changes to bowel/bladder habits.       Past Medical History:   Diagnosis Date    Acne rosacea 11/02/2021    Arthritis     Asthma 11/02/2021    Asthma episode with extreme mold exposure     Bronchospasm 11/02/2021    Cholelithiasis     Colon polyp     Diverticulitis 11/02/2021 8/2005. 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    Hyperlipidemia 11/02/2021    Irritable bowel syndrome     Ovarian cyst 11/02/2021 1993    Pancreatitis two episodes    Ulcerative colitis       Family History   Problem Relation Age of Onset    Irritable bowel syndrome Mother     Cirrhosis Father     Alcohol abuse Father     Malig Hyperthermia Neg Hx       Past Surgical History:   Procedure Laterality Date    APPENDECTOMY      BLEPHAROPLASTY Bilateral 12/19/2017    Procedure: BILATERAL  BLEPHAROPLASTY LOWER LID ;  Surgeon: Zachery Fermin MD;  Location: [facility] [place];  Service:     COLON RESECTION      COLONOSCOPY      ENTROPIAN REPAIR Bilateral 12/19/2017    Procedure: RIGHT LOWER LID ENTROPION REPAIR, LEFT LOWER LID ECTROPION REPAIR;  Surgeon: Zachery Fermin MD;  Location: [facility] [place];  Service:     HERNIA REPAIR      JOINT REPLACEMENT      KNEE ARTHROPLASTY Right     LAPAROSCOPIC CHOLECYSTECTOMY          Current Outpatient Medications:     ezetimibe (Zetia) 10 MG tablet, Take 1 tablet by mouth Daily., Disp: 90 tablet, Rfl: 2    magnesium oxide (MAG-OX) 400 MG  "tablet, Take 1 tablet by mouth Daily., Disp: , Rfl:     montelukast (SINGULAIR) 10 MG tablet, Take 1 tablet by mouth Daily., Disp: 90 tablet, Rfl: 3    simvastatin (ZOCOR) 40 MG tablet, Take 1 tablet by mouth every night at bedtime., Disp: 90 tablet, Rfl: 3    vitamin D (ERGOCALCIFEROL) 1.25 MG (91493 UT) capsule capsule, Take 1 capsule by mouth Every 7 (Seven) Days., Disp: 12 capsule, Rfl: 3    OBJECTIVE  Vital Signs:   /60 (BP Location: Left arm, Patient Position: Sitting)   Pulse 93   Temp 97.1 °F (36.2 °C) (Temporal)   Resp 18   Ht 165.1 cm (65\")   Wt 77.7 kg (171 lb 6.4 oz)   SpO2 95%   BMI 28.52 kg/m²    Estimated body mass index is 28.52 kg/m² as calculated from the following:    Height as of this encounter: 165.1 cm (65\").    Weight as of this encounter: 77.7 kg (171 lb 6.4 oz).     Wt Readings from Last 3 Encounters:   09/06/24 77.7 kg (171 lb 6.4 oz)   03/27/24 83.5 kg (184 lb)   02/29/24 78 kg (172 lb)     BP Readings from Last 3 Encounters:   09/06/24 122/60   03/27/24 112/52   02/29/24 117/69       Physical Exam  Vitals and nursing note reviewed.   Constitutional:       Appearance: Normal appearance.   HENT:      Head: Normocephalic.   Eyes:      Extraocular Movements: Extraocular movements intact.      Conjunctiva/sclera: Conjunctivae normal.   Cardiovascular:      Rate and Rhythm: Normal rate and regular rhythm.      Heart sounds: Normal heart sounds. No murmur heard.  Pulmonary:      Effort: Pulmonary effort is normal.      Breath sounds: Normal breath sounds. No wheezing or rales.   Abdominal:      General: Bowel sounds are normal.      Palpations: Abdomen is soft.      Tenderness: There is no abdominal tenderness. There is no guarding.   Musculoskeletal:         General: No swelling. Normal range of motion.      Cervical back: Normal range of motion and neck supple.   Skin:     General: Skin is warm and dry.   Neurological:      General: No focal deficit present.      Mental Status: " She is alert and oriented to person, place, and time. Mental status is at baseline.   Psychiatric:         Mood and Affect: Mood normal.         Behavior: Behavior normal.         Thought Content: Thought content normal.         Judgment: Judgment normal.          Result Review    CMP          2/14/2024    09:08 2/29/2024    14:05 8/29/2024    09:21   CMP   Glucose 90  95  85    BUN 19  18  13    Creatinine 0.75  0.83  0.81    EGFR 82.1  72.7  74.9    Sodium 144  141  141    Potassium 4.0  4.4  4.5    Chloride 107  105  106    Calcium 10.0  9.9  10.2    Total Protein 6.7  6.7  7.0    Albumin 4.8  4.4  4.5    Globulin 1.9  2.3  2.5    Total Bilirubin 0.5  0.3  0.6    Alkaline Phosphatase 148  171  142    AST (SGOT) 17  20  24    ALT (SGPT) 20  21  24    Albumin/Globulin Ratio 2.5  1.9  1.8    BUN/Creatinine Ratio 25.3  21.7  16.0    Anion Gap 10.0  13.1  9.6      CBC w/diff          11/20/2023    09:36 2/14/2024    09:08 8/29/2024    09:21   CBC w/Diff   WBC 8.10  7.68  7.12    RBC 4.69  4.47  4.58    Hemoglobin 15.1  14.2  14.8    Hematocrit 43.2  41.5  43.1    MCV 92.1  92.8  94.1    MCH 32.2  31.8  32.3    MCHC 35.0  34.2  34.3    RDW 12.1  12.3  12.7    Platelets 213  175  205    Neutrophil Rel % 58.6  56.3  58.7    Immature Granulocyte Rel % 0.2  0.4  0.3    Lymphocyte Rel % 29.0  30.3  28.8    Monocyte Rel % 9.5  10.4  9.7    Eosinophil Rel % 2.0  1.8  1.8    Basophil Rel % 0.7  0.8  0.7      Lipid Panel          11/20/2023    09:36 2/14/2024    09:08 8/29/2024    09:21   Lipid Panel   Total Cholesterol 128  125  140    Triglycerides 180  177  202    HDL Cholesterol 39  41  36    VLDL Cholesterol 30  29  34    LDL Cholesterol  59  55  70    LDL/HDL Ratio 1.36  1.19  1.77      TSH          11/20/2023    09:36 2/14/2024    09:08 8/29/2024    09:21   TSH   TSH 1.350  1.130  1.060            Lab Results   Component Value Date    QOPD17VF 32.2 08/29/2024     Lab Results   Component Value Date    FREET4 0.99  08/14/2023     Lab Results   Component Value Date    CIALCFVL56 748 08/29/2024     Lab Results   Component Value Date    RBCUA 0-2 11/20/2023    WBCUA 0-2 11/20/2023    BACTERIA None Seen 11/20/2023    SQUAMEPIUA 0-2 11/20/2023    HYALCASTU None Seen 11/20/2023    METHODOLOGY Automated Microscopy 11/20/2023    COLORU Yellow 11/20/2023    CLARITYU Clear 11/20/2023    PHUR 7.0 11/20/2023    SPECGRAVUR 1.021 11/20/2023    GLUCOSEU Negative 11/20/2023    KETONESU Negative 11/20/2023    BILIRUBINUR Negative 11/20/2023    BLOODU Negative 11/20/2023    PROTEINUA Negative 11/20/2023    LEUKOCYTESUR Negative 11/20/2023    NITRITEU Negative 11/20/2023    UROBILINOGEN 0.2 E.U./dL 11/20/2023       Mammo Screening Digital Tomosynthesis Bilateral With CAD    Result Date: 5/23/2024  No mammographic signs of malignancy. Recommend annual screening mammography  TISSUE DENSITY: There are scattered areas of fibroglandular density.  BI-RADS ASSESSMENT: BI-RADS 2. Benign findings.   Note: It has been reported that there is approximately a 15% false negative in mammography. Therefore, management of a palpable abnormality should not be deferred because of a negative mammogram.           Electronically Signed By-MADIE FONTENOT MD On:5/23/2024 4:33 PM         The above data has been reviewed by CHEN Mcintosh 09/06/2024 13:25 EDT.          Patient Care Team:  Sofy Branch APRN as PCP - General (Internal Medicine)  Tata Mendez APRN as Nurse Practitioner (Nurse Practitioner)            ASSESSMENT & PLAN    Diagnoses and all orders for this visit:    1. Mixed hyperlipidemia (Primary)  Assessment & Plan:  Stable for pt  Ldl at 70  Continue current medication regimen.      Orders:  -     CBC & Differential; Future  -     Comprehensive Metabolic Panel; Future  -     Lipid Panel; Future  -     TSH Rfx On Abnormal To Free T4; Future  -     Magnesium; Future  -     Vitamin B12 & Folate; Future    2. Coronary atherosclerosis due to  lipid rich plaque  Assessment & Plan:  Mild coronary artery atherosclerosis noted on CT  Patient denies any chest pain, palpitations, soa.  Continue with healthy diet, regular exercise, daily asa and antihyperlipidemics.     Orders:  -     CBC & Differential; Future  -     Comprehensive Metabolic Panel; Future  -     Lipid Panel; Future  -     TSH Rfx On Abnormal To Free T4; Future  -     Magnesium; Future  -     Vitamin B12 & Folate; Future    3. Hepatic steatosis  Assessment & Plan:  Recommend healthier diet, regular exercise, weight loss.   Follow up with GI as scheduled.     Orders:  -     CBC & Differential; Future  -     Comprehensive Metabolic Panel; Future  -     Lipid Panel; Future  -     TSH Rfx On Abnormal To Free T4; Future  -     Magnesium; Future  -     Vitamin B12 & Folate; Future    4. Vitamin D deficiency  Assessment & Plan:  She remains on vit D 03387 units once/weekly.     Orders:  -     Vitamin D,25-Hydroxy; Future    5. Elevated ferritin  -     Ferritin; Future  -     Iron Profile; Future         Tobacco Use: Low Risk  (9/6/2024)    Patient History     Smoking Tobacco Use: Never     Smokeless Tobacco Use: Never     Passive Exposure: Not on file       Follow Up     Return in about 6 months (around 3/6/2025) for Medicare Wellness.    Please note that portions of this note were completed with a voice recognition program.    Patient was given instructions and counseling regarding her condition or for health maintenance advice. Please see specific information pulled into the AVS if appropriate.   I have reviewed information obtained and documented by others and I have confirmed the accuracy of this documented note.    CHEN Mcintosh

## 2024-10-01 ENCOUNTER — OFFICE VISIT (OUTPATIENT)
Dept: GASTROENTEROLOGY | Facility: CLINIC | Age: 79
End: 2024-10-01
Payer: MEDICARE

## 2024-10-01 VITALS
OXYGEN SATURATION: 97 % | DIASTOLIC BLOOD PRESSURE: 54 MMHG | WEIGHT: 175 LBS | HEIGHT: 65 IN | HEART RATE: 65 BPM | SYSTOLIC BLOOD PRESSURE: 114 MMHG | BODY MASS INDEX: 29.16 KG/M2

## 2024-10-01 DIAGNOSIS — E78.49 OTHER HYPERLIPIDEMIA: ICD-10-CM

## 2024-10-01 DIAGNOSIS — R74.8 ELEVATED ALKALINE PHOSPHATASE LEVEL: ICD-10-CM

## 2024-10-01 DIAGNOSIS — K76.0 FATTY LIVER: Primary | ICD-10-CM

## 2024-10-01 PROCEDURE — 1159F MED LIST DOCD IN RCRD: CPT | Performed by: NURSE PRACTITIONER

## 2024-10-01 PROCEDURE — 99214 OFFICE O/P EST MOD 30 MIN: CPT | Performed by: NURSE PRACTITIONER

## 2024-10-01 PROCEDURE — 1160F RVW MEDS BY RX/DR IN RCRD: CPT | Performed by: NURSE PRACTITIONER

## 2024-10-01 NOTE — PROGRESS NOTES
Chief Complaint   fatty liver    History of Present Illness       Maryam Sandoval is a 79 y.o. female who presents to [facility] for follow-up for fatty liver.  She was last seen in the office by me on 3/31/2024.    Most recent labs show an elevated alkaline phosphatase at 165 this past November.  She had an MRCP done in July 2022 that showed near resolution of the focal pancreatitis involving the pancreatic tail.  No intrahepatic or extrahepatic biliary dilatation patient status postcholecystectomy.  Liver appeared normal at that time.  She did have a right hepatic cyst noted.  She had a CT scan of the abdomen and pelvis done in July 2022 that showed a small hiatal hernia.  Positive for pancreatitis.     Last EGD/colonoscopy----Admits she has had several colonoscopies in the past. She does have hx colon polyps.      She admits her bowels can be loose depending on what she eats. She has been diagnosed IBS in the past. Denies any rectal bleeding or melena.      She denies any reflux or dysphagia. Good appetite.         GI family history----denies any      Most recent LFTs look good except for a slightly elevated alkaline phosphatase at 171 as of 2/29/2024.  Liver serology showed an elevated AURORA.  The rest of the liver serology was normal.  GGT was normal at 16.  Liver ultrasound did show hepatic steatosis.  Prior cholecystectomy.  No biliary dilatation. She is still having loose stools and admits she doesn't feel like she empties all the way every time she has BM. She is already on Vitamin E. She denies any ETOH. She denies any NSAIDs. She does tylenol PRN.      She seems to be doing really well.  Most recent labs look good except for a slightly elevated alkaline phosphatase.  GGT was normal.  Liver serology normal.  She did have hepatic steatosis on imaging.  Will check a [employer] FibroSure for further evaluation of her fatty liver disease.  We had another long discussion  today about fatty liver disease.  She is going to work on her diet and exercise.  Results       Result Review :       CMP          2/14/2024    09:08 2/29/2024    14:05 8/29/2024    09:21   CMP   Glucose 90  95  85    BUN 19  18  13    Creatinine 0.75  0.83  0.81    EGFR 82.1  72.7  74.9    Sodium 144  141  141    Potassium 4.0  4.4  4.5    Chloride 107  105  106    Calcium 10.0  9.9  10.2    Total Protein 6.7  6.7  7.0    Albumin 4.8  4.4  4.5    Globulin 1.9  2.3  2.5    Total Bilirubin 0.5  0.3  0.6    Alkaline Phosphatase 148  171  142    AST (SGOT) 17  20  24    ALT (SGPT) 20  21  24    Albumin/Globulin Ratio 2.5  1.9  1.8    BUN/Creatinine Ratio 25.3  21.7  16.0    Anion Gap 10.0  13.1  9.6      CBC          11/20/2023    09:36 2/14/2024    09:08 8/29/2024    09:21   CBC   WBC 8.10  7.68  7.12    RBC 4.69  4.47  4.58    Hemoglobin 15.1  14.2  14.8    Hematocrit 43.2  41.5  43.1    MCV 92.1  92.8  94.1    MCH 32.2  31.8  32.3    MCHC 35.0  34.2  34.3    RDW 12.1  12.3  12.7    Platelets 213  175  205      CBC w/diff          11/20/2023    09:36 2/14/2024    09:08 8/29/2024    09:21   CBC w/Diff   WBC 8.10  7.68  7.12    RBC 4.69  4.47  4.58    Hemoglobin 15.1  14.2  14.8    Hematocrit 43.2  41.5  43.1    MCV 92.1  92.8  94.1    MCH 32.2  31.8  32.3    MCHC 35.0  34.2  34.3    RDW 12.1  12.3  12.7    Platelets 213  175  205    Neutrophil Rel % 58.6  56.3  58.7    Immature Granulocyte Rel % 0.2  0.4  0.3    Lymphocyte Rel % 29.0  30.3  28.8    Monocyte Rel % 9.5  10.4  9.7    Eosinophil Rel % 2.0  1.8  1.8    Basophil Rel % 0.7  0.8  0.7      Lipid Panel          11/20/2023    09:36 2/14/2024    09:08 8/29/2024    09:21   Lipid Panel   Total Cholesterol 128  125  140    Triglycerides 180  177  202    HDL Cholesterol 39  41  36    VLDL Cholesterol 30  29  34    LDL Cholesterol  59  55  70    LDL/HDL Ratio 1.36  1.19  1.77      TSH          11/20/2023    09:36 2/14/2024    09:08 8/29/2024    09:21   TSH   TSH 1.350  " 1.130  1.060        Lipase   Lipase   Date Value Ref Range Status   07/15/2022 36 10 - 50 Units/Liter Final     Amylase No results found for: \"AMYLASE\"  Iron Profile   Iron   Date Value Ref Range Status   02/29/2024 92 37 - 145 mcg/dL Final     TIBC   Date Value Ref Range Status   02/29/2024 383 298 - 536 mcg/dL Final     Iron Saturation (TSAT)   Date Value Ref Range Status   02/29/2024 24 20 - 50 % Final     Transferrin   Date Value Ref Range Status   02/29/2024 257 200 - 360 mg/dL Final     Ferritin   Ferritin   Date Value Ref Range Status   11/20/2023 373.00 (H) 13.00 - 150.00 ng/mL Final               Past Medical History       Past Medical History:   Diagnosis Date    Acne rosacea 11/02/2021    Arthritis     Asthma 11/02/2021    Asthma episode with extreme mold exposure     Bronchospasm 11/02/2021    Cholelithiasis     Colon polyp     Diverticulitis 11/02/2021 8/2005. 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    Hyperlipidemia 11/02/2021    Irritable bowel syndrome     Ovarian cyst 11/02/2021    1993    Pancreatitis two episodes    Ulcerative colitis        Past Surgical History:   Procedure Laterality Date    APPENDECTOMY      BLEPHAROPLASTY Bilateral 12/19/2017    Procedure: BILATERAL  BLEPHAROPLASTY LOWER LID ;  Surgeon: Zachery Fermin MD;  Location: [facility] [place];  Service:     COLON RESECTION      COLONOSCOPY      ENTROPIAN REPAIR Bilateral 12/19/2017    Procedure: RIGHT LOWER LID ENTROPION REPAIR, LEFT LOWER LID ECTROPION REPAIR;  Surgeon: Zachery Fermin MD;  Location: [facility] [place];  Service:     HERNIA REPAIR      JOINT REPLACEMENT      KNEE ARTHROPLASTY Right     LAPAROSCOPIC CHOLECYSTECTOMY           Current Outpatient Medications:     ezetimibe (Zetia) 10 MG tablet, Take 1 tablet by mouth Daily., Disp: 90 tablet, Rfl: 2    magnesium oxide (MAG-OX) 400 MG tablet, Take 1 tablet by mouth Daily., Disp: , Rfl:     montelukast " "(SINGULAIR) 10 MG tablet, Take 1 tablet by mouth Daily., Disp: 90 tablet, Rfl: 3    simvastatin (ZOCOR) 40 MG tablet, Take 1 tablet by mouth every night at bedtime., Disp: 90 tablet, Rfl: 3    vitamin D (ERGOCALCIFEROL) 1.25 MG (44485 UT) capsule capsule, Take 1 capsule by mouth Every 7 (Seven) Days., Disp: 12 capsule, Rfl: 3     Allergies   Allergen Reactions    Cipro [Ciprofloxacin Hcl] GI Intolerance    Flagyl [Metronidazole] GI Intolerance       Family History   Problem Relation Age of Onset    Irritable bowel syndrome Mother     Cirrhosis Father     Alcohol abuse Father     Malig Hyperthermia Neg Hx         Social History     Social History Narrative    Not on file       Objective       Review of Systems   Constitutional:  Negative for appetite change, fatigue, fever, unexpected weight gain and unexpected weight loss.   HENT:  Negative for trouble swallowing.    Respiratory:  Negative for cough, choking, chest tightness, shortness of breath, wheezing and stridor.    Cardiovascular:  Negative for chest pain, palpitations and leg swelling.   Gastrointestinal:  Negative for abdominal distention, abdominal pain, anal bleeding, blood in stool, constipation, diarrhea, nausea, rectal pain, vomiting, GERD and indigestion.        Vital Signs:   /54 (BP Location: Right arm, Patient Position: Sitting)   Pulse 65   Ht 165.1 cm (65\")   Wt 79.4 kg (175 lb)   SpO2 97%   BMI 29.12 kg/m²       Physical Exam  Constitutional:       General: She is not in acute distress.     Appearance: She is well-developed. She is not ill-appearing.   HENT:      Head: Normocephalic.   Eyes:      Pupils: Pupils are equal, round, and reactive to light.   Cardiovascular:      Rate and Rhythm: Normal rate and regular rhythm.      Heart sounds: Normal heart sounds.   Pulmonary:      Effort: Pulmonary effort is normal.      Breath sounds: Normal breath sounds.   Abdominal:      General: Bowel sounds are normal. There is no distension.      " Palpations: Abdomen is soft. There is no mass.      Tenderness: There is no abdominal tenderness. There is no guarding or rebound.      Hernia: No hernia is present.   Musculoskeletal:         General: Normal range of motion.   Skin:     General: Skin is warm and dry.   Neurological:      Mental Status: She is alert and oriented to person, place, and time.   Psychiatric:         Speech: Speech normal.         Behavior: Behavior normal.         Judgment: Judgment normal.           Assessment & Plan          Assessment and Plan    Diagnoses and all orders for this visit:    1. Fatty liver (Primary)  -     PATRICK Fibrosure    2. Elevated alkaline phosphatase level  -     PATRICK Fibrosure    3. Other hyperlipidemia  -     PATRICK Fibrosure    Reviewed most recent lab and imaging results with her today.  Most recent alkaline phosphatase was slightly elevated.  GGT was normal.  Ultrasound was positive for hepatic steatosis.  Will check [facility] FibroSure for staging.  Continue high-fiber diet.  I did give her a handout today on fatty liver disease.  We had a long conversation about fatty liver disease.  She is going to continue to work on her diet and exercise.  Patient to call the office with any issues.  Patient to follow-up with me in 6 months.  Patient is agreeable to the plan.            Follow Up       Follow Up   Return in about 6 months (around 4/1/2025) for FATTY LIVER.  Patient was given instructions and counseling regarding her condition or for health maintenance advice. Please see specific information pulled into the AVS if appropriate.

## 2024-10-31 ENCOUNTER — TELEPHONE (OUTPATIENT)
Dept: GASTROENTEROLOGY | Facility: CLINIC | Age: 79
End: 2024-10-31
Payer: MEDICARE

## 2024-10-31 NOTE — TELEPHONE ENCOUNTER
Left message with patient asking for a returned call to follow up on overdue results for laboratory tests.

## 2024-10-31 NOTE — TELEPHONE ENCOUNTER
Patient called back and stated that she is not getting this lab until closer to her appointment. Will postpone until closer to that time.

## 2024-12-09 NOTE — TELEPHONE ENCOUNTER
Caller: Maryam Sandoval Janny    Relationship: Self    Best call back number: [phone]    Requested Prescriptions:   Requested Prescriptions     Pending Prescriptions Disp Refills    montelukast (SINGULAIR) 10 MG tablet 90 tablet 3     Sig: Take 1 tablet by mouth Daily.    vitamin D (ERGOCALCIFEROL) 1.25 MG (99362 UT) capsule capsule 12 capsule 3     Sig: Take 1 capsule by mouth Every 7 (Seven) Days.    simvastatin (ZOCOR) 40 MG tablet 90 tablet 3     Sig: Take 1 tablet by mouth every night at bedtime.        Pharmacy where request should be sent: [id number] - [place], [place] - [address] AT [facility] [address] [facility] [address] - [phone]  - [phone] FX     Last office visit with prescribing clinician: 9/6/2024   Last telemedicine visit with prescribing clinician: Visit date not found   Next office visit with prescribing clinician: 3/6/2025     Does the patient have less than a 3 day supply:  [] Yes  [x] No    Would you like a call back once the refill request has been completed: [] Yes [x] No    If the office needs to give you a call back, can they leave a voicemail: [] Yes [x] No    Mehdi Hemphill Rep   12/09/24 11:35 EST

## 2024-12-10 RX ORDER — ERGOCALCIFEROL 1.25 MG/1
50000 CAPSULE, LIQUID FILLED ORAL
Qty: 12 CAPSULE | Refills: 3 | Status: SHIPPED | OUTPATIENT
Start: 2024-12-10

## 2024-12-10 RX ORDER — MONTELUKAST SODIUM 10 MG/1
10 TABLET ORAL DAILY
Qty: 90 TABLET | Refills: 3 | Status: SHIPPED | OUTPATIENT
Start: 2024-12-10

## 2024-12-10 RX ORDER — SIMVASTATIN 40 MG
40 TABLET ORAL
Qty: 90 TABLET | Refills: 3 | Status: SHIPPED | OUTPATIENT
Start: 2024-12-10

## 2025-01-14 ENCOUNTER — OFFICE VISIT (OUTPATIENT)
Dept: INTERNAL MEDICINE | Age: 80
End: 2025-01-14
Payer: MEDICARE

## 2025-01-14 VITALS
SYSTOLIC BLOOD PRESSURE: 130 MMHG | OXYGEN SATURATION: 99 % | RESPIRATION RATE: 18 BRPM | TEMPERATURE: 97.1 F | HEART RATE: 71 BPM | BODY MASS INDEX: 29.19 KG/M2 | WEIGHT: 175.2 LBS | HEIGHT: 65 IN | DIASTOLIC BLOOD PRESSURE: 74 MMHG

## 2025-01-14 DIAGNOSIS — N64.4 BREAST PAIN, RIGHT: Primary | ICD-10-CM

## 2025-01-14 PROCEDURE — 1159F MED LIST DOCD IN RCRD: CPT | Performed by: NURSE PRACTITIONER

## 2025-01-14 PROCEDURE — 99213 OFFICE O/P EST LOW 20 MIN: CPT | Performed by: NURSE PRACTITIONER

## 2025-01-14 PROCEDURE — 1160F RVW MEDS BY RX/DR IN RCRD: CPT | Performed by: NURSE PRACTITIONER

## 2025-01-14 PROCEDURE — G2211 COMPLEX E/M VISIT ADD ON: HCPCS | Performed by: NURSE PRACTITIONER

## 2025-01-14 NOTE — PROGRESS NOTES
Chief Complaint  Breast Pain (Patient is here due to breast pain/side pain on the right side. Patient states this has been going on for several weeks )    Subjective      History of Present Illness  The patient is a 79-year-old female who presents for an acute visit.    She reports experiencing pain in her right breast, which she describes as deep-seated and localized towards the back. This discomfort has been persistent for several weeks. She characterizes the pain as dull and almost constant, although it becomes less noticeable when she is preoccupied. She has not detected any palpable nodules or lumps in the area. She has not sought relief from over-the-counter analgesics such as ibuprofen or Tylenol. She is not experiencing any associated symptoms such as redness, fever, or shortness of breath. She expresses concern about the need for a mammogram, given that her last one was conducted on 05/23/2024. She recalls undergoing a biopsy for a cyst in the same breast approximately 20 years ago.       Past Medical History:   Diagnosis Date    Acne rosacea 11/02/2021    Arthritis     Asthma 11/02/2021    Asthma episode with extreme mold exposure     Bronchospasm 11/02/2021    Cholelithiasis     Colon polyp     Diverticulitis 11/02/2021 8/2005. 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    Hyperlipidemia 11/02/2021    Irritable bowel syndrome     Ovarian cyst 11/02/2021 1993    Pancreatitis two episodes    Ulcerative colitis         Past Surgical History:   Procedure Laterality Date    APPENDECTOMY      BLEPHAROPLASTY Bilateral 12/19/2017    Procedure: BILATERAL  BLEPHAROPLASTY LOWER LID ;  Surgeon: Zachery Fermin MD;  Location: [facility];  Service:     COLON RESECTION      COLONOSCOPY      ENTROPIAN REPAIR Bilateral 12/19/2017    Procedure: RIGHT LOWER LID ENTROPION REPAIR, LEFT LOWER LID ECTROPION REPAIR;  Surgeon: Zachery Fermin MD;   "Location:  [facility];  Service:     HERNIA REPAIR      JOINT REPLACEMENT      KNEE ARTHROPLASTY Right     LAPAROSCOPIC CHOLECYSTECTOMY          Social History     Tobacco Use   Smoking Status Never   Smokeless Tobacco Never        Patient Care Team:  Sofy Branch APRN as PCP - General (Internal Medicine)  Tata Mendez APRN as Nurse Practitioner (Nurse Practitioner)    Allergies   Allergen Reactions    Cipro [Ciprofloxacin Hcl] GI Intolerance    Flagyl [Metronidazole] GI Intolerance          Current Outpatient Medications:     ezetimibe (Zetia) 10 MG tablet, Take 1 tablet by mouth Daily., Disp: 90 tablet, Rfl: 2    magnesium oxide (MAG-OX) 400 MG tablet, Take 1 tablet by mouth Daily., Disp: , Rfl:     montelukast (SINGULAIR) 10 MG tablet, Take 1 tablet by mouth Daily., Disp: 90 tablet, Rfl: 3    simvastatin (ZOCOR) 40 MG tablet, Take 1 tablet by mouth every night at bedtime., Disp: 90 tablet, Rfl: 3    vitamin D (ERGOCALCIFEROL) 1.25 MG (19405 UT) capsule capsule, Take 1 capsule by mouth Every 7 (Seven) Days., Disp: 12 capsule, Rfl: 3    Objective     Vitals:    01/14/25 1411   BP: 130/74   BP Location: Right arm   Patient Position: Sitting   Cuff Size: Large Adult   Pulse: 71   Resp: 18   Temp: 97.1 °F (36.2 °C)   SpO2: 99%   Weight: 79.5 kg (175 lb 3.2 oz)   Height: 165.1 cm (65\")        Wt Readings from Last 3 Encounters:   01/14/25 79.5 kg (175 lb 3.2 oz)   10/01/24 79.4 kg (175 lb)   09/06/24 77.7 kg (171 lb 6.4 oz)        BP Readings from Last 3 Encounters:   01/14/25 130/74   10/01/24 114/54   09/06/24 122/60          Physical Exam  Vitals reviewed. Exam conducted with a chaperone present.   Constitutional:       General: She is not in acute distress.  HENT:      Head: Normocephalic and atraumatic.   Pulmonary:      Effort: Pulmonary effort is normal.   Chest:      Chest wall: No mass.   Breasts:     Right: Normal. No mass or tenderness.      Left: Normal. No mass or tenderness. "   Lymphadenopathy:      Upper Body:      Right upper body: No supraclavicular, axillary or pectoral adenopathy.      Left upper body: No supraclavicular, axillary or pectoral adenopathy.   Neurological:      General: No focal deficit present.      Mental Status: She is alert.   Psychiatric:         Thought Content: Thought content normal.                Result Review   The following data was reviewed by: CHEN Duncan on 01/14/2025:  [x]  Tests & Results  []  Hospitalization/Emergency Department/Urgent Care  []  Internal/External Consultant Notes       Assessment and Plan     Diagnoses and all orders for this visit:    1. Breast pain, right (Primary)  -     US Breast Right Limited; Future  -     Mammo Diagnostic Digital Tomosynthesis Right With CAD; Future         Assessment & Plan  1. Right breast pain.  She reports a dull, almost constant pain in the right breast for the past couple of weeks. There are no associated symptoms such as redness, fever, or shortness of breath.  An urgent diagnostic mammogram and ultrasound have been ordered to further evaluate the pain.    PROCEDURE  The patient underwent a biopsy for a cyst in the right breast approximately 20 years ago.     Patient was given instructions and counseling regarding her condition or for health maintenance advice. Please see specific information pulled into the AVS if appropriate.     Follow Up   Return for Follow-up post test.    Patient or patient representative verbalized consent for the use of Ambient Listening during the visit with  CHEN Duncan for chart documentation. 1/19/2025  14:30 EST    CHEN Duncan

## 2025-01-16 ENCOUNTER — TELEPHONE (OUTPATIENT)
Dept: INTERNAL MEDICINE | Age: 80
End: 2025-01-16
Payer: MEDICARE

## 2025-01-16 NOTE — TELEPHONE ENCOUNTER
Spoke w/pt she stated scheduling called her to schedule her right breast US and right mammogram and told it wouldn't be until February.    States she was told this was an urgent need and would like to have it done sooner.     Was told     Could we change this to STAT to have this done sooner?

## 2025-01-21 ENCOUNTER — HOSPITAL ENCOUNTER (OUTPATIENT)
Dept: ULTRASOUND IMAGING | Facility: HOSPITAL | Age: 80
Discharge: HOME | End: 2025-01-21
Payer: MEDICARE

## 2025-01-21 ENCOUNTER — HOSPITAL ENCOUNTER (OUTPATIENT)
Dept: MAMMOGRAPHY | Facility: HOSPITAL | Age: 80
Discharge: HOME | End: 2025-01-21
Payer: MEDICARE

## 2025-01-21 DIAGNOSIS — N64.4 BREAST PAIN, RIGHT: ICD-10-CM

## 2025-01-21 PROCEDURE — 77065 DX MAMMO INCL CAD UNI: CPT

## 2025-01-21 PROCEDURE — G0279 TOMOSYNTHESIS, MAMMO: HCPCS

## 2025-03-03 ENCOUNTER — LAB (OUTPATIENT)
Dept: INTERNAL MEDICINE | Age: 80
End: 2025-03-03
Payer: MEDICARE

## 2025-03-03 DIAGNOSIS — K76.0 HEPATIC STEATOSIS: ICD-10-CM

## 2025-03-03 DIAGNOSIS — E78.2 MIXED HYPERLIPIDEMIA: ICD-10-CM

## 2025-03-03 DIAGNOSIS — I25.83 CORONARY ATHEROSCLEROSIS DUE TO LIPID RICH PLAQUE: ICD-10-CM

## 2025-03-03 DIAGNOSIS — E55.9 VITAMIN D DEFICIENCY: ICD-10-CM

## 2025-03-03 DIAGNOSIS — R79.89 ELEVATED FERRITIN: ICD-10-CM

## 2025-03-03 LAB
25(OH)D3 SERPL-MCNC: 32.7 NG/ML (ref 30–100)
ALBUMIN SERPL-MCNC: 4.3 G/DL (ref 3.5–5.2)
ALBUMIN/GLOB SERPL: 1.7 G/DL
ALP SERPL-CCNC: 156 U/L (ref 39–117)
ALT SERPL W P-5'-P-CCNC: 22 U/L (ref 1–33)
ANION GAP SERPL CALCULATED.3IONS-SCNC: 11.8 MMOL/L (ref 5–15)
AST SERPL-CCNC: 24 U/L (ref 1–32)
BASOPHILS # BLD AUTO: 0.08 10*3/MM3 (ref 0–0.2)
BASOPHILS NFR BLD AUTO: 1 % (ref 0–1.5)
BILIRUB SERPL-MCNC: 0.5 MG/DL (ref 0–1.2)
BUN SERPL-MCNC: 13 MG/DL (ref 8–23)
BUN/CREAT SERPL: 17.3 (ref 7–25)
CALCIUM SPEC-SCNC: 10.1 MG/DL (ref 8.6–10.5)
CHLORIDE SERPL-SCNC: 103 MMOL/L (ref 98–107)
CHOLEST SERPL-MCNC: 155 MG/DL (ref 0–200)
CO2 SERPL-SCNC: 25.2 MMOL/L (ref 22–29)
CREAT SERPL-MCNC: 0.75 MG/DL (ref 0.57–1)
DEPRECATED RDW RBC AUTO: 42.6 FL (ref 37–54)
EGFRCR SERPLBLD CKD-EPI 2021: 81.6 ML/MIN/1.73
EOSINOPHIL # BLD AUTO: 0.18 10*3/MM3 (ref 0–0.4)
EOSINOPHIL NFR BLD AUTO: 2.2 % (ref 0.3–6.2)
ERYTHROCYTE [DISTWIDTH] IN BLOOD BY AUTOMATED COUNT: 12.2 % (ref 12.3–15.4)
FERRITIN SERPL-MCNC: 381 NG/ML (ref 13–150)
FOLATE SERPL-MCNC: >20 NG/ML (ref 4.78–24.2)
GLOBULIN UR ELPH-MCNC: 2.5 GM/DL
GLUCOSE SERPL-MCNC: 90 MG/DL (ref 65–99)
HCT VFR BLD AUTO: 44 % (ref 34–46.6)
HDLC SERPL-MCNC: 38 MG/DL (ref 40–60)
HGB BLD-MCNC: 14.8 G/DL (ref 12–15.9)
IMM GRANULOCYTES # BLD AUTO: 0.03 10*3/MM3 (ref 0–0.05)
IMM GRANULOCYTES NFR BLD AUTO: 0.4 % (ref 0–0.5)
IRON 24H UR-MRATE: 111 MCG/DL (ref 37–145)
IRON SATN MFR SERPL: 31 % (ref 20–50)
LDLC SERPL CALC-MCNC: 80 MG/DL (ref 0–100)
LDLC/HDLC SERPL: 1.91 {RATIO}
LYMPHOCYTES # BLD AUTO: 2.04 10*3/MM3 (ref 0.7–3.1)
LYMPHOCYTES NFR BLD AUTO: 25 % (ref 19.6–45.3)
MAGNESIUM SERPL-MCNC: 2.3 MG/DL (ref 1.6–2.4)
MCH RBC QN AUTO: 32 PG (ref 26.6–33)
MCHC RBC AUTO-ENTMCNC: 33.6 G/DL (ref 31.5–35.7)
MCV RBC AUTO: 95 FL (ref 79–97)
MONOCYTES # BLD AUTO: 0.88 10*3/MM3 (ref 0.1–0.9)
MONOCYTES NFR BLD AUTO: 10.8 % (ref 5–12)
NEUTROPHILS NFR BLD AUTO: 4.96 10*3/MM3 (ref 1.7–7)
NEUTROPHILS NFR BLD AUTO: 60.6 % (ref 42.7–76)
NRBC BLD AUTO-RTO: 0 /100 WBC (ref 0–0.2)
PLATELET # BLD AUTO: 220 10*3/MM3 (ref 140–450)
PMV BLD AUTO: 8.9 FL (ref 6–12)
POTASSIUM SERPL-SCNC: 4.3 MMOL/L (ref 3.5–5.2)
PROT SERPL-MCNC: 6.8 G/DL (ref 6–8.5)
RBC # BLD AUTO: 4.63 10*6/MM3 (ref 3.77–5.28)
SODIUM SERPL-SCNC: 140 MMOL/L (ref 136–145)
TIBC SERPL-MCNC: 361 MCG/DL (ref 298–536)
TRANSFERRIN SERPL-MCNC: 242 MG/DL (ref 200–360)
TRIGL SERPL-MCNC: 223 MG/DL (ref 0–150)
TSH SERPL DL<=0.05 MIU/L-ACNC: 1.67 UIU/ML (ref 0.27–4.2)
VIT B12 BLD-MCNC: 631 PG/ML (ref 211–946)
VLDLC SERPL-MCNC: 37 MG/DL (ref 5–40)
WBC NRBC COR # BLD AUTO: 8.17 10*3/MM3 (ref 3.4–10.8)

## 2025-03-03 PROCEDURE — 83735 ASSAY OF MAGNESIUM: CPT

## 2025-03-03 PROCEDURE — 82306 VITAMIN D 25 HYDROXY: CPT

## 2025-03-03 PROCEDURE — 80061 LIPID PANEL: CPT

## 2025-03-03 PROCEDURE — 82746 ASSAY OF FOLIC ACID SERUM: CPT

## 2025-03-03 PROCEDURE — 83540 ASSAY OF IRON: CPT

## 2025-03-03 PROCEDURE — 80053 COMPREHEN METABOLIC PANEL: CPT

## 2025-03-03 PROCEDURE — 85025 COMPLETE CBC W/AUTO DIFF WBC: CPT

## 2025-03-03 PROCEDURE — 84466 ASSAY OF TRANSFERRIN: CPT

## 2025-03-03 PROCEDURE — 82728 ASSAY OF FERRITIN: CPT

## 2025-03-03 PROCEDURE — 36415 COLL VENOUS BLD VENIPUNCTURE: CPT

## 2025-03-03 PROCEDURE — 84443 ASSAY THYROID STIM HORMONE: CPT

## 2025-03-03 PROCEDURE — 82607 VITAMIN B-12: CPT

## 2025-03-06 ENCOUNTER — OFFICE VISIT (OUTPATIENT)
Dept: INTERNAL MEDICINE | Age: 80
End: 2025-03-06
Payer: MEDICARE

## 2025-03-06 VITALS
SYSTOLIC BLOOD PRESSURE: 124 MMHG | TEMPERATURE: 97.8 F | HEIGHT: 65 IN | WEIGHT: 175 LBS | OXYGEN SATURATION: 98 % | RESPIRATION RATE: 18 BRPM | HEART RATE: 68 BPM | BODY MASS INDEX: 29.16 KG/M2 | DIASTOLIC BLOOD PRESSURE: 72 MMHG

## 2025-03-06 DIAGNOSIS — E83.41 HYPERMAGNESEMIA: ICD-10-CM

## 2025-03-06 DIAGNOSIS — J30.2 SEASONAL ALLERGIC RHINITIS, UNSPECIFIED TRIGGER: ICD-10-CM

## 2025-03-06 DIAGNOSIS — E78.2 MIXED HYPERLIPIDEMIA: ICD-10-CM

## 2025-03-06 DIAGNOSIS — J45.20 MILD INTERMITTENT ASTHMA WITHOUT COMPLICATION: ICD-10-CM

## 2025-03-06 DIAGNOSIS — E55.9 VITAMIN D DEFICIENCY: ICD-10-CM

## 2025-03-06 DIAGNOSIS — K76.0 HEPATIC STEATOSIS: ICD-10-CM

## 2025-03-06 DIAGNOSIS — I25.83 CORONARY ATHEROSCLEROSIS DUE TO LIPID RICH PLAQUE: ICD-10-CM

## 2025-03-06 DIAGNOSIS — Z00.00 ENCOUNTER FOR SUBSEQUENT ANNUAL WELLNESS VISIT (AWV) IN MEDICARE PATIENT: Primary | ICD-10-CM

## 2025-03-06 RX ORDER — EZETIMIBE 10 MG/1
10 TABLET ORAL DAILY
Qty: 90 TABLET | Refills: 2 | Status: SHIPPED | OUTPATIENT
Start: 2025-03-06

## 2025-03-06 NOTE — ASSESSMENT & PLAN NOTE
Currently taking Zetia 10 mg tablets daily, simvastatin 40 mg tablet nightly.  Continue current medication regimen.

## 2025-03-06 NOTE — ASSESSMENT & PLAN NOTE
History of asthma.  Allergies and asthma are stable at this time.  Currently on Singulair 10 mg tablet.  Continue current medication regimen.

## 2025-03-06 NOTE — ASSESSMENT & PLAN NOTE
Vitamin D is remains on the lower side of normal.  Continue vitamin D 50,000 units weekly.  Will recheck labs in a few months.

## 2025-03-06 NOTE — PROGRESS NOTES
Subjective   The ABCs of the Annual Wellness Visit  Medicare Wellness Visit      Maryam Sandoval is a 79 y.o. patient who presents for a Medicare Wellness Visit.    The following portions of the patient's history were reviewed and   updated as appropriate: allergies, current medications, past family history, past medical history, past social history, past surgical history, and problem list.    Compared to one year ago, the patient's physical   health is the same.  Compared to one year ago, the patient's mental   health is worse.    Recent Hospitalizations:  She was not admitted to the hospital during the last year.     Current Medical Providers:  Patient Care Team:  Sofy Branch APRN as PCP - General (Internal Medicine)  Tata Mendez APRN as Nurse Practitioner (Nurse Practitioner)    Outpatient Medications Prior to Visit   Medication Sig Dispense Refill    magnesium oxide (MAG-OX) 400 MG tablet Take 1 tablet by mouth Daily.      montelukast (SINGULAIR) 10 MG tablet Take 1 tablet by mouth Daily. 90 tablet 3    simvastatin (ZOCOR) 40 MG tablet Take 1 tablet by mouth every night at bedtime. 90 tablet 3    vitamin D (ERGOCALCIFEROL) 1.25 MG (23751 UT) capsule capsule Take 1 capsule by mouth Every 7 (Seven) Days. 12 capsule 3    ezetimibe (Zetia) 10 MG tablet Take 1 tablet by mouth Daily. 90 tablet 2     No facility-administered medications prior to visit.     No opioid medication identified on active medication list. I have reviewed chart for other potential  high risk medication/s and harmful drug interactions in the elderly.      Aspirin is not on active medication list.  Aspirin use is not indicated based on review of current medical condition/s. Risk of harm outweighs potential benefits.  .    Patient Active Problem List   Diagnosis    Acute pancreatitis    Histoplasmosis    Ovarian cyst    Asthma    Hyperlipidemia    Acne rosacea    Diverticulitis    Bronchospasm    Colon abnormality     "Seasonal allergic rhinitis    Osteopenia of hip    Chronic pain of both shoulders    Myalgia due to statin    Acute URI    Coronary atherosclerosis due to lipid rich plaque    Hypermagnesemia    Primary osteoarthritis of left wrist    Vitamin D deficiency    Elevated ferritin    Irritant contact dermatitis due to cosmetics    Hepatic steatosis     Advance Care Planning Advance Directive is on file.  ACP discussion was declined by the patient. Patient has an advance directive in EMR which is still valid.             Objective   Vitals:    03/06/25 1345   BP: 124/72   BP Location: Left arm   Patient Position: Sitting   Pulse: 68   Resp: 18   Temp: 97.8 °F (36.6 °C)   TempSrc: Temporal   SpO2: 98%   Weight: 79.4 kg (175 lb)   Height: 165.1 cm (65\")   PainSc: 0-No pain       Estimated body mass index is 29.12 kg/m² as calculated from the following:    Height as of this encounter: 165.1 cm (65\").    Weight as of this encounter: 79.4 kg (175 lb).    BMI is >= 25 and <30. (Overweight) The following options were offered after discussion;: exercise counseling/recommendations and nutrition counseling/recommendations           Does the patient have evidence of cognitive impairment? No  Lab Results   Component Value Date    TRIG 223 (H) 03/03/2025    HDL 38 (L) 03/03/2025    LDL 80 03/03/2025    VLDL 37 03/03/2025                                                                                                Health  Risk Assessment    Smoking Status:  Social History     Tobacco Use   Smoking Status Never   Smokeless Tobacco Never     Alcohol Consumption:  Social History     Substance and Sexual Activity   Alcohol Use Yes    Alcohol/week: 1.0 standard drink of alcohol    Types: 1 Glasses of wine per week    Comment: seldom, rarely       Fall Risk Screen  STEADI Fall Risk Assessment was completed, and patient is at MODERATE risk for falls. Assessment completed on:3/6/2025    Depression Screening   Little interest or pleasure in " doing things? Not at all   Feeling down, depressed, or hopeless? Several days   PHQ-2 Total Score 1      Health Habits and Functional and Cognitive Screening:      3/5/2025     2:17 PM   Functional & Cognitive Status   Do you have difficulty preparing food and eating? No    Do you have difficulty bathing yourself, getting dressed or grooming yourself? No    Do you have difficulty using the toilet? No    Do you have difficulty moving around from place to place? No    Do you have trouble with steps or getting out of a bed or a chair? No    Current Diet Limited Junk Food    Dental Exam Up to date    Eye Exam Up to date    Exercise (times per week) 3 times per week    Current Exercises Include Stationary Bicycling/Spin Class;Walking    Do you need help using the phone?  No    Are you deaf or do you have serious difficulty hearing?  No    Do you need help to go to places out of walking distance? No    Do you need help shopping? No    Do you need help preparing meals?  No    Do you need help with housework?  No    Do you need help with laundry? No    Do you need help taking your medications? No    Do you need help managing money? No    Do you ever drive or ride in a car without wearing a seat belt? No    Have you felt unusual stress, anger or loneliness in the last month? No    Who do you live with? Spouse    If you need help, do you have trouble finding someone available to you? No    Have you been bothered in the last four weeks by sexual problems? No    Do you have difficulty concentrating, remembering or making decisions? No        Patient-reported           Age-appropriate Screening Schedule:  Refer to the list below for future screening recommendations based on patient's age, sex and/or medical conditions. Orders for these recommended tests are listed in the plan section. The patient has been provided with a written plan.    Health Maintenance List  Health Maintenance   Topic Date Due    BMI FOLLOWUP  02/28/2025     DXA SCAN  07/21/2025    LIPID PANEL  03/03/2026    ANNUAL WELLNESS VISIT  03/06/2026    COLORECTAL CANCER SCREENING  06/18/2028    TDAP/TD VACCINES (5 - Td or Tdap) 10/24/2034    HEPATITIS C SCREENING  Completed    COVID-19 Vaccine  Completed    RSV Vaccine - Adults  Completed    INFLUENZA VACCINE  Completed    Pneumococcal Vaccine 50+  Completed    ZOSTER VACCINE  Completed    MAMMOGRAM  Discontinued                                                                                                                                                CMS Preventative Services Quick Reference  Risk Factors Identified During Encounter  Dental Screening Recommended  Vision Screening Recommended    The above risks/problems have been discussed with the patient.  Pertinent information has been shared with the patient in the After Visit Summary.  An After Visit Summary and PPPS were made available to the patient.    Follow Up:   Next Medicare Wellness visit to be scheduled in 1 year.          Additional E&M Note during same encounter follows:  Patient has multiple medical problems which are significant and separately identifiable that require additional work above and beyond the Medicare Wellness Visit.      Chief Complaint  Medicare Wellness-subsequent (79 year old female here today for her annual Medicare Wellness Exam and lab review)    Maryam Sandoval is a 79 y.o. female who presents to [facility]     History of Present Illness  The patient presents for a Medicare wellness physical.    She has expressed concern regarding her elevated liver enzymes, although she is uncertain if they have increased since the last evaluation. She is under the care of Dr. Muller, a gastroenterologist, but is not currently on any medication. She is unsure if she should be more cautious with her diet. She has been diagnosed with fatty liver disease. She reports no alcohol consumption or use of NSAIDs such  "as ibuprofen or Motrin but admits to occasional Tylenol use. She acknowledges a recent weight gain and plans to improve her dietary habits.    She has a history of hypercholesterolemia and is currently on simvastatin and Zetia, which she tolerates well. She reports a family history of elevated triglycerides.    She is on a regimen of vitamin D 50,000 units.    She is also taking magnesium oxide, prescribed by Dr. Goetz, but is unsure of the reason for its prescription. She does not report experiencing acid reflux.    She reports that her physical health has remained stable compared to the previous year. However, she notes a decline in her mental health, attributing it to the stress of caring for her , who has been diagnosed with several severe illnesses. She has not required hospitalization within the past year and has not experienced any illness. She is uncertain about the existence of an advanced directive.    SOCIAL HISTORY  She does not drink alcohol.    FAMILY HISTORY  She reports a family history of high cholesterol.    MEDICATIONS  Current: Zetia, simvastatin, vitamin D, magnesium oxide    Objective   Vital Signs:   Vitals:    03/06/25 1345   BP: 124/72   BP Location: Left arm   Patient Position: Sitting   Pulse: 68   Resp: 18   Temp: 97.8 °F (36.6 °C)   TempSrc: Temporal   SpO2: 98%   Weight: 79.4 kg (175 lb)   Height: 165.1 cm (65\")   PainSc: 0-No pain       Wt Readings from Last 3 Encounters:   03/06/25 79.4 kg (175 lb)   01/14/25 79.5 kg (175 lb 3.2 oz)   10/01/24 79.4 kg (175 lb)     BP Readings from Last 3 Encounters:   03/06/25 124/72   01/14/25 130/74   10/01/24 114/54       Physical Exam  Vitals and nursing note reviewed.   Constitutional:       Appearance: Normal appearance.   HENT:      Head: Normocephalic and atraumatic.   Cardiovascular:      Rate and Rhythm: Normal rate and regular rhythm.      Heart sounds: Normal heart sounds.   Pulmonary:      Effort: Pulmonary effort is normal.      " Breath sounds: Normal breath sounds.   Abdominal:      General: Abdomen is flat. Bowel sounds are normal.      Palpations: Abdomen is soft.   Musculoskeletal:         General: Normal range of motion.      Cervical back: Normal range of motion.   Skin:     General: Skin is warm and dry.   Neurological:      General: No focal deficit present.      Mental Status: She is alert and oriented to person, place, and time. Mental status is at baseline.   Psychiatric:         Mood and Affect: Mood normal.         Behavior: Behavior normal.         Thought Content: Thought content normal.         Judgment: Judgment normal.         Physical Exam  Lungs were auscultated.    The following data was reviewed by CHEN Mcintosh on 03/06/2025  CMP   CMP          8/29/2024    09:21 3/3/2025    09:39   CMP   Glucose 85  90    BUN 13  13    Creatinine 0.81  0.75    EGFR 74.9  81.6    Sodium 141  140    Potassium 4.5  4.3    Chloride 106  103    Calcium 10.2  10.1    Total Protein 7.0  6.8    Albumin 4.5  4.3    Globulin 2.5  2.5    Total Bilirubin 0.6  0.5    Alkaline Phosphatase 142  156    AST (SGOT) 24  24    ALT (SGPT) 24  22    Albumin/Globulin Ratio 1.8  1.7    BUN/Creatinine Ratio 16.0  17.3    Anion Gap 9.6  11.8      CBC   CBC          8/29/2024    09:21 3/3/2025    09:39   CBC   WBC 7.12  8.17    RBC 4.58  4.63    Hemoglobin 14.8  14.8    Hematocrit 43.1  44.0    MCV 94.1  95.0    MCH 32.3  32.0    MCHC 34.3  33.6    RDW 12.7  12.2    Platelets 205  220      CBC w/ DIFF   CBC w/diff          8/29/2024    09:21 3/3/2025    09:39   CBC w/Diff   WBC 7.12  8.17    RBC 4.58  4.63    Hemoglobin 14.8  14.8    Hematocrit 43.1  44.0    MCV 94.1  95.0    MCH 32.3  32.0    MCHC 34.3  33.6    RDW 12.7  12.2    Platelets 205  220    Neutrophil Rel % 58.7  60.6    Immature Granulocyte Rel % 0.3  0.4    Lymphocyte Rel % 28.8  25.0    Monocyte Rel % 9.7  10.8    Eosinophil Rel % 1.8  2.2    Basophil Rel % 0.7  1.0      LIPID   Lipid Panel           8/29/2024    09:21 3/3/2025    09:39   Lipid Panel   Total Cholesterol 140  155    Triglycerides 202  223    HDL Cholesterol 36  38    VLDL Cholesterol 34  37    LDL Cholesterol  70  80    LDL/HDL Ratio 1.77  1.91      TSH   TSH          8/29/2024    09:21 3/3/2025    09:39   TSH   TSH 1.060  1.670      A1C     Results  Laboratory Studies  Alkaline phosphatase was 156, previously 142 and 171. Triglycerides were high. HDL was slightly lower than normal. Ferritin was elevated. B12 was normal. Magnesium was normal. Thyroid was normal. Vitamin D was on the lower side of normal.        Assessment & Plan   Diagnoses and all orders for this visit:    1. Encounter for subsequent annual wellness visit (AWV) in Medicare patient (Primary)    2. Seasonal allergic rhinitis, unspecified trigger    3. Mild intermittent asthma without complication  Assessment & Plan:  History of asthma.  Allergies and asthma are stable at this time.  Currently on Singulair 10 mg tablet.  Continue current medication regimen.              4. Mixed hyperlipidemia  Assessment & Plan:  Currently taking Zetia 10 mg tablets daily, simvastatin 40 mg tablet nightly.  Continue current medication regimen.      Orders:  -     CBC & Differential; Future  -     Comprehensive Metabolic Panel; Future  -     Lipid Panel; Future  -     Urinalysis With Microscopic - Urine, Clean Catch; Future  -     TSH Rfx On Abnormal To Free T4; Future  -     Vitamin B12 & Folate; Future    5. Vitamin D deficiency  Assessment & Plan:  Vitamin D is remains on the lower side of normal.  Continue vitamin D 50,000 units weekly.  Will recheck labs in a few months.    Orders:  -     Vitamin D,25-Hydroxy; Future    6. Coronary atherosclerosis due to lipid rich plaque  -     CBC & Differential; Future  -     Comprehensive Metabolic Panel; Future  -     Lipid Panel; Future    7. Hepatic steatosis  -     Comprehensive Metabolic Panel; Future  -     Lipid Panel; Future  -     Iron  Profile; Future  -     Ferritin; Future    8. Hypermagnesemia  -     Magnesium; Future    Other orders  -     ezetimibe (Zetia) 10 MG tablet; Take 1 tablet by mouth Daily.  Dispense: 90 tablet; Refill: 2        Assessment & Plan  1. Elevated liver enzymes.  Her alkaline phosphatase levels have consistently been above the normal range, with the most recent reading at 156, a slight increase from the previous reading of 142, but a decrease from the prior reading of 171. She has been diagnosed with fatty liver disease and is currently not on any medication for it. She has been advised to maintain a high-fiber diet and regular exercise regimen. A referral to a gastroenterologist has been made for further evaluation.    2. Hypercholesterolemia.  Her triglyceride levels are elevated, and her HDL cholesterol is slightly below the desired range. She is currently on simvastatin and Zetia, which she is tolerating well. She has been advised to continue her current medication regimen and to adhere to a healthy diet and regular exercise.    3. Elevated ferritin levels.  Her ferritin levels are elevated, which could be related to her elevated liver enzymes. This information will be communicated to Dr. Goetz for further evaluation and potential referral to a specialist.    4. Vitamin D deficiency.  Her vitamin D levels are slightly below the normal range. She has been advised to continue her current regimen of vitamin D 50,000 units. A re-evaluation of her lab results will be conducted in a few months.    5. Magnesium supplementation.  Her magnesium levels, which were previously elevated, have now returned to the normal range. She is currently taking over-the-counter magnesium oxide, which she believes was recommended by Dr. Goetz.    6. Medicare wellness visit.  She has been informed that she can bring her advanced directive to the office for scanning and inclusion in her medical chart.       BMI is >= 25 and <30. (Overweight) The  following options were offered after discussion;: exercise counseling/recommendations and nutrition counseling/recommendations       FOLLOW UP  Return in about 4 months (around 7/6/2025) for Recheck.  Patient was given instructions and counseling regarding her condition or for health maintenance advice. Please see specific information pulled into the AVS if appropriate.     Patient or patient representative verbalized consent for the use of Ambient Listening during the visit with  CHEN Mcintosh for chart documentation. 3/6/2025  14:00 CHEN Rivas  03/06/25  16:26 EST

## 2025-03-18 ENCOUNTER — TELEPHONE (OUTPATIENT)
Dept: GASTROENTEROLOGY | Facility: CLINIC | Age: 80
End: 2025-03-18
Payer: MEDICARE

## 2025-03-18 NOTE — TELEPHONE ENCOUNTER
Spoke with patient regarding rescheduling her appointment with Tata Mendez on 4/1/25, due to provider being out of the office. She has rescheduled her appointment to 5/1/25

## 2025-04-09 ENCOUNTER — TRANSCRIBE ORDERS (OUTPATIENT)
Dept: ADMINISTRATIVE | Facility: HOSPITAL | Age: 80
End: 2025-04-09
Payer: MEDICARE

## 2025-04-09 DIAGNOSIS — Z12.31 SCREENING MAMMOGRAM FOR BREAST CANCER: Primary | ICD-10-CM

## 2025-04-16 ENCOUNTER — CLINICAL SUPPORT (OUTPATIENT)
Dept: INTERNAL MEDICINE | Age: 80
End: 2025-04-16
Payer: MEDICARE

## 2025-04-16 DIAGNOSIS — K76.0 HEPATIC STEATOSIS: Primary | ICD-10-CM

## 2025-04-16 PROCEDURE — 84450 TRANSFERASE (AST) (SGOT): CPT | Performed by: NURSE PRACTITIONER

## 2025-04-16 PROCEDURE — 36415 COLL VENOUS BLD VENIPUNCTURE: CPT | Performed by: NURSE PRACTITIONER

## 2025-04-16 PROCEDURE — 83010 ASSAY OF HAPTOGLOBIN QUANT: CPT | Performed by: NURSE PRACTITIONER

## 2025-04-16 PROCEDURE — 82465 ASSAY BLD/SERUM CHOLESTEROL: CPT | Performed by: NURSE PRACTITIONER

## 2025-04-16 PROCEDURE — 83883 ASSAY NEPHELOMETRY NOT SPEC: CPT | Performed by: NURSE PRACTITIONER

## 2025-04-16 PROCEDURE — 84478 ASSAY OF TRIGLYCERIDES: CPT | Performed by: NURSE PRACTITIONER

## 2025-04-16 PROCEDURE — 84460 ALANINE AMINO (ALT) (SGPT): CPT | Performed by: NURSE PRACTITIONER

## 2025-04-16 PROCEDURE — 82977 ASSAY OF GGT: CPT | Performed by: NURSE PRACTITIONER

## 2025-04-16 PROCEDURE — 82947 ASSAY GLUCOSE BLOOD QUANT: CPT | Performed by: NURSE PRACTITIONER

## 2025-04-16 PROCEDURE — 82172 ASSAY OF APOLIPOPROTEIN: CPT | Performed by: NURSE PRACTITIONER

## 2025-04-16 PROCEDURE — 82247 BILIRUBIN TOTAL: CPT | Performed by: NURSE PRACTITIONER

## 2025-04-18 LAB
A2 MACROGLOB SERPL-MCNC: 274 MG/DL (ref 110–276)
ALT SERPL W P-5'-P-CCNC: 26 IU/L (ref 0–40)
APO A-I SERPL-MCNC: 146 MG/DL (ref 116–209)
AST SERPL W P-5'-P-CCNC: 24 IU/L (ref 0–40)
BILIRUB SERPL-MCNC: 0.4 MG/DL (ref 0–1.2)
CHOLEST SERPL-MCNC: 143 MG/DL (ref 100–199)
FIBROSIS SCORING:: ABNORMAL
FIBROSIS STAGE SERPL QL: ABNORMAL
GGT SERPL-CCNC: 16 IU/L (ref 0–60)
GLUCOSE SERPL-MCNC: 102 MG/DL (ref 70–99)
HAPTOGLOB SERPL-MCNC: 176 MG/DL (ref 42–346)
LABORATORY COMMENT REPORT: ABNORMAL
LIVER FIBR SCORE SERPL CALC.FIBROSURE: 0.32 (ref 0–0.21)
LIVER STEATOSIS GRADE SERPL QL: ABNORMAL
LIVER STEATOSIS SCORE SERPL: 0.55 (ref 0–0.4)
NASH GRADE SERPL QL: ABNORMAL
NASH INTERPRETATION SERPL-IMP: ABNORMAL
NASH SCORE SERPL: 0.7 (ref 0–0.25)
NASH SCORING: ABNORMAL
STEATOSIS SCORING: ABNORMAL
TEST PERFORMANCE INFO SPEC: ABNORMAL
TEST PERFORMANCE INFO SPEC: ABNORMAL
TRIGL SERPL-MCNC: 202 MG/DL (ref 0–149)

## 2025-04-21 ENCOUNTER — RESULTS FOLLOW-UP (OUTPATIENT)
Dept: GASTROENTEROLOGY | Facility: CLINIC | Age: 80
End: 2025-04-21
Payer: MEDICARE

## 2025-05-19 ENCOUNTER — OFFICE VISIT (OUTPATIENT)
Dept: INTERNAL MEDICINE | Age: 80
End: 2025-05-19
Payer: MEDICARE

## 2025-05-19 VITALS
BODY MASS INDEX: 27.66 KG/M2 | TEMPERATURE: 98 F | OXYGEN SATURATION: 98 % | RESPIRATION RATE: 18 BRPM | SYSTOLIC BLOOD PRESSURE: 122 MMHG | HEIGHT: 65 IN | WEIGHT: 166 LBS | DIASTOLIC BLOOD PRESSURE: 68 MMHG | HEART RATE: 66 BPM

## 2025-05-19 DIAGNOSIS — L50.8 OTHER URTICARIA: ICD-10-CM

## 2025-05-19 DIAGNOSIS — R50.9 FEVER, UNSPECIFIED FEVER CAUSE: Primary | ICD-10-CM
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
## 2025-05-19 DIAGNOSIS — R51.9 ACUTE INTRACTABLE HEADACHE, UNSPECIFIED HEADACHE TYPE: ICD-10-CM

## 2025-05-19 PROCEDURE — G2211 COMPLEX E/M VISIT ADD ON: HCPCS

## 2025-05-19 PROCEDURE — 99214 OFFICE O/P EST MOD 30 MIN: CPT

## 2025-05-19 PROCEDURE — 1126F AMNT PAIN NOTED NONE PRSNT: CPT

## 2025-05-19 PROCEDURE — 1160F RVW MEDS BY RX/DR IN RCRD: CPT

## 2025-05-19 PROCEDURE — 1159F MED LIST DOCD IN RCRD: CPT

## 2025-05-19 RX ORDER — DOXYCYCLINE 100 MG/1
100 CAPSULE ORAL 2 TIMES DAILY
Qty: 20 CAPSULE | Refills: 0 | Status: SHIPPED | OUTPATIENT
Start: 2025-05-19

## 2025-05-19 NOTE — PROGRESS NOTES
Chief Complaint  Fever (79 year old female here today complaining of fever at night, headaches, fatigue, and shakes after being bitten by a tick April 30th. States she was seen at [facility] on May 3rd and the records are in [facility]. )    History of Present Illness  SUBJECTIVE  Maryam Sandoval presents to [facility]   History of Present Illness  The patient presents for evaluation of a tick bite.    She reports a tick bite on her buttock at the end of 04/2025, which was promptly removed by her . The tick was not engorged and did not exhibit any characteristic dot. She was bedridden for 4 days with chills and a fever peaking at 102 degrees. Currently, she experiences intermittent fevers, swollen glands, persistent nausea, and a lack of appetite and energy. Her recent fevers have been mild, around 99.5 to 99.6 degrees, but were significantly higher for several days. She has lost approximately 8 pounds. She does not report any itching at the site of the tick bite. Despite a 2-week course of doxycycline, she continues to experience these symptoms. She was also prescribed an anti-nausea medication.        Past Medical History:   Diagnosis Date    Acne rosacea 11/02/2021    Allergic [facility] crud    Arthritis     Asthma 11/02/2021    Asthma episode with extreme mold exposure     Bronchospasm 11/02/2021    Cholelithiasis     Colon polyp     Diverticulitis 11/02/2021 8/2005. 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    Hyperlipidemia 11/02/2021    Irritable bowel syndrome     Ovarian cyst 11/02/2021 1993    Pancreatitis two episodes    Ulcerative colitis       Family History   Problem Relation Age of Onset    Irritable bowel syndrome Mother     Cirrhosis Father     Alcohol abuse Father     Early death Father     Cancer Brother     Malig Hyperthermia Neg Hx       Past  "Surgical History:   Procedure Laterality Date    APPENDECTOMY      BLEPHAROPLASTY Bilateral 12/19/2017    Procedure: BILATERAL  BLEPHAROPLASTY LOWER LID ;  Surgeon: Zachery Fermin MD;  Location: [facility];  Service:     COLON RESECTION      COLON SURGERY  maybe 2010    COLONOSCOPY      ENTROPIAN REPAIR Bilateral 12/19/2017    Procedure: RIGHT LOWER LID ENTROPION REPAIR, LEFT LOWER LID ECTROPION REPAIR;  Surgeon: Zachery Fermin MD;  Location: [facility];  Service:     HERNIA REPAIR      JOINT REPLACEMENT      KNEE ARTHROPLASTY Right     LAPAROSCOPIC CHOLECYSTECTOMY          Current Outpatient Medications:     ezetimibe (Zetia) 10 MG tablet, Take 1 tablet by mouth Daily., Disp: 90 tablet, Rfl: 2    magnesium oxide (MAG-OX) 400 MG tablet, Take 1 tablet by mouth Daily., Disp: , Rfl:     montelukast (SINGULAIR) 10 MG tablet, Take 1 tablet by mouth Daily., Disp: 90 tablet, Rfl: 3    simvastatin (ZOCOR) 40 MG tablet, Take 1 tablet by mouth every night at bedtime., Disp: 90 tablet, Rfl: 3    vitamin D (ERGOCALCIFEROL) 1.25 MG (34555 UT) capsule capsule, Take 1 capsule by mouth Every 7 (Seven) Days., Disp: 12 capsule, Rfl: 3    doxycycline (VIBRAMYCIN) 100 MG capsule, Take 1 capsule by mouth 2 (Two) Times a Day., Disp: 20 capsule, Rfl: 0    OBJECTIVE  Vital Signs:   /68 (BP Location: Left arm, Patient Position: Sitting)   Pulse 66   Temp 98 °F (36.7 °C) (Temporal)   Resp 18   Ht 165.1 cm (65\")   Wt 75.3 kg (166 lb)   SpO2 98%   BMI 27.62 kg/m²    Estimated body mass index is 27.62 kg/m² as calculated from the following:    Height as of this encounter: 165.1 cm (65\").    Weight as of this encounter: 75.3 kg (166 lb).     Wt Readings from Last 3 Encounters:   05/19/25 75.3 kg (166 lb)   03/06/25 79.4 kg (175 lb)   01/14/25 79.5 kg (175 lb 3.2 oz)     BP Readings from Last 3 Encounters:   05/19/25 122/68   03/06/25 124/72   01/14/25 130/74       Physical Exam  Vitals and nursing note " reviewed.   Constitutional:       Appearance: Normal appearance.   HENT:      Head: Normocephalic.   Eyes:      Extraocular Movements: Extraocular movements intact.      Conjunctiva/sclera: Conjunctivae normal.   Cardiovascular:      Rate and Rhythm: Normal rate and regular rhythm.      Heart sounds: Normal heart sounds. No murmur heard.  Pulmonary:      Effort: Pulmonary effort is normal.      Breath sounds: Normal breath sounds. No wheezing or rales.   Abdominal:      General: Bowel sounds are normal.      Palpations: Abdomen is soft.      Tenderness: There is no abdominal tenderness. There is no guarding.   Musculoskeletal:         General: No swelling. Normal range of motion.      Cervical back: Normal range of motion and neck supple.   Skin:     General: Skin is warm and dry.   Neurological:      General: No focal deficit present.      Mental Status: She is alert and oriented to person, place, and time. Mental status is at baseline.   Psychiatric:         Mood and Affect: Mood normal.         Behavior: Behavior normal.         Thought Content: Thought content normal.         Judgment: Judgment normal.          Result Review        Mammo Diagnostic Digital Tomosynthesis Right With CAD  Result Date: 1/21/2025  No evidence of malignancy. Recommend resume annual screening mammography.    TISSUE DENSITY:  There are scattered areas of fibroglandular density.  BI-RADS ASSESSMENT: BI-RADS 2. Benign findings.   Note: It has been reported that there is approximately a 15% false negative in mammography. Therefore, management of a palpable abnormality should not be deferred because of a negative mammogram.    Electronically Signed By-Rony Quevedo MD On:1/21/2025 2:22 PM         The above data has been reviewed by CHEN Mcintosh 05/19/2025 11:58 EDT.          Patient Care Team:  Sofy Branch APRN as PCP - General (Internal Medicine)  Tata Mendez APRN as Nurse Practitioner (Nurse Practitioner)             ASSESSMENT & PLAN    Diagnoses and all orders for this visit:    1. Fever, unspecified fever cause (Primary)  -     Spotted Fever Group AB, IgG/IgM; Future  -     Tick Panel; Future  -     Sedimentation Rate; Future  -     C-reactive protein; Future  -     CBC w AUTO Differential; Future  -     Alpha-Gal IgE Panel; Future  -     Comprehensive metabolic panel; Future    2. Acute intractable headache, unspecified headache type  -     Spotted Fever Group AB, IgG/IgM; Future  -     Tick Panel; Future  -     Sedimentation Rate; Future  -     C-reactive protein; Future  -     CBC w AUTO Differential; Future  -     Alpha-Gal IgE Panel; Future  -     Comprehensive metabolic panel; Future    3. Tick bite, unspecified site, subsequent encounter  -     Spotted Fever Group AB, IgG/IgM; Future  -     Tick Panel; Future  -     Sedimentation Rate; Future  -     C-reactive protein; Future  -     CBC w AUTO Differential; Future  -     Alpha-Gal IgE Panel; Future  -     doxycycline (VIBRAMYCIN) 100 MG capsule; Take 1 capsule by mouth 2 (Two) Times a Day.  Dispense: 20 capsule; Refill: 0  -     Comprehensive metabolic panel; Future    4. Other urticaria  -     Alpha-Gal IgE Panel; Future         Assessment & Plan  1. Tick bite.  - Reports a tick bite at the end of April, followed by symptoms including chills, fever, swollen glands, nausea, and weight loss. Initial tests for Lyme disease and ehrlichiosis were negative.  - Physical examination reveals a small bite major, not super red or engorged. Fever has been fluctuating, currently around 99.5°F. Weight loss of approximately 8 pounds noted.  - Discussed the possibility of ongoing infection and the need for further laboratory tests, including blood counts, liver function tests, and an alpha-gal test to check for potential allergies related to the tick bite.  - A second course of doxycycline will be initiated until lab results are available. Advised to discontinue magnesium  supplement and vitamins during this period as they may reduce the effectiveness of the antibiotic. Follow-up appointment scheduled in 2 weeks.      Tobacco Use: Low Risk  (5/19/2025)    Patient History     Smoking Tobacco Use: Never     Smokeless Tobacco Use: Never     Passive Exposure: Not on file       Follow Up     Return in about 2 weeks (around 6/2/2025), or if symptoms worsen or fail to improve, for Recheck.    Please note that portions of this note were completed with a voice recognition program.    Patient was given instructions and counseling regarding her condition or for health maintenance advice. Please see specific information pulled into the AVS if appropriate.   I have reviewed information obtained and documented by others and I have confirmed the accuracy of this documented note.    CHEN Mcintosh    Patient or patient representative verbalized consent for the use of Ambient Listening during the visit with  CHEN Mcintosh for chart documentation. 5/19/2025  13:41 EDT

## 2025-05-21 ENCOUNTER — LAB (OUTPATIENT)
Facility: HOSPITAL | Age: 80
End: 2025-05-21
Payer: MEDICARE

## 2025-05-21 DIAGNOSIS — E78.2 MIXED HYPERLIPIDEMIA: ICD-10-CM

## 2025-05-21 DIAGNOSIS — L50.8 OTHER URTICARIA: ICD-10-CM

## 2025-05-21 DIAGNOSIS — K76.0 HEPATIC STEATOSIS: ICD-10-CM

## 2025-05-21 DIAGNOSIS — R50.9 FEVER, UNSPECIFIED FEVER CAUSE: ICD-10-CM
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
## 2025-05-21 DIAGNOSIS — R51.9 ACUTE INTRACTABLE HEADACHE, UNSPECIFIED HEADACHE TYPE: ICD-10-CM

## 2025-05-21 DIAGNOSIS — I25.83 CORONARY ATHEROSCLEROSIS DUE TO LIPID RICH PLAQUE: ICD-10-CM

## 2025-05-21 LAB
ALBUMIN SERPL-MCNC: 4.3 G/DL (ref 3.5–5.2)
ALBUMIN/GLOB SERPL: 1.4 G/DL
ALP SERPL-CCNC: 147 U/L (ref 39–117)
ALT SERPL W P-5'-P-CCNC: 21 U/L (ref 1–33)
ANION GAP SERPL CALCULATED.3IONS-SCNC: 12.2 MMOL/L (ref 5–15)
AST SERPL-CCNC: 18 U/L (ref 1–32)
BASOPHILS # BLD AUTO: 0.07 10*3/MM3 (ref 0–0.2)
BASOPHILS NFR BLD AUTO: 0.6 % (ref 0–1.5)
BILIRUB SERPL-MCNC: 0.5 MG/DL (ref 0–1.2)
BUN SERPL-MCNC: 10 MG/DL (ref 8–23)
BUN/CREAT SERPL: 13.2 (ref 7–25)
CALCIUM SPEC-SCNC: 10.5 MG/DL (ref 8.6–10.5)
CHLORIDE SERPL-SCNC: 104 MMOL/L (ref 98–107)
CHOLEST SERPL-MCNC: 118 MG/DL (ref 0–200)
CO2 SERPL-SCNC: 24.8 MMOL/L (ref 22–29)
CREAT SERPL-MCNC: 0.76 MG/DL (ref 0.57–1)
CRP SERPL-MCNC: 1.75 MG/DL (ref 0–0.5)
DEPRECATED RDW RBC AUTO: 43.6 FL (ref 37–54)
EGFRCR SERPLBLD CKD-EPI 2021: 80.3 ML/MIN/1.73
EOSINOPHIL # BLD AUTO: 0.2 10*3/MM3 (ref 0–0.4)
EOSINOPHIL NFR BLD AUTO: 1.8 % (ref 0.3–6.2)
ERYTHROCYTE [DISTWIDTH] IN BLOOD BY AUTOMATED COUNT: 12.1 % (ref 12.3–15.4)
ERYTHROCYTE [SEDIMENTATION RATE] IN BLOOD: 17 MM/HR (ref 0–30)
GLOBULIN UR ELPH-MCNC: 3 GM/DL
GLUCOSE SERPL-MCNC: 104 MG/DL (ref 65–99)
HCT VFR BLD AUTO: 43.6 % (ref 34–46.6)
HDLC SERPL-MCNC: 34 MG/DL (ref 40–60)
HGB BLD-MCNC: 14.3 G/DL (ref 12–15.9)
IMM GRANULOCYTES # BLD AUTO: 0.04 10*3/MM3 (ref 0–0.05)
IMM GRANULOCYTES NFR BLD AUTO: 0.4 % (ref 0–0.5)
LDLC SERPL CALC-MCNC: 53 MG/DL (ref 0–100)
LDLC/HDLC SERPL: 1.39 {RATIO}
LYMPHOCYTES # BLD AUTO: 2.13 10*3/MM3 (ref 0.7–3.1)
LYMPHOCYTES NFR BLD AUTO: 18.7 % (ref 19.6–45.3)
MCH RBC QN AUTO: 31.8 PG (ref 26.6–33)
MCHC RBC AUTO-ENTMCNC: 32.8 G/DL (ref 31.5–35.7)
MCV RBC AUTO: 97.1 FL (ref 79–97)
MONOCYTES # BLD AUTO: 0.94 10*3/MM3 (ref 0.1–0.9)
MONOCYTES NFR BLD AUTO: 8.3 % (ref 5–12)
NEUTROPHILS NFR BLD AUTO: 70.2 % (ref 42.7–76)
NEUTROPHILS NFR BLD AUTO: 8.01 10*3/MM3 (ref 1.7–7)
NRBC BLD AUTO-RTO: 0 /100 WBC (ref 0–0.2)
PLATELET # BLD AUTO: 276 10*3/MM3 (ref 140–450)
PMV BLD AUTO: 9.2 FL (ref 6–12)
POTASSIUM SERPL-SCNC: 4.1 MMOL/L (ref 3.5–5.2)
PROT SERPL-MCNC: 7.3 G/DL (ref 6–8.5)
RBC # BLD AUTO: 4.49 10*6/MM3 (ref 3.77–5.28)
SODIUM SERPL-SCNC: 141 MMOL/L (ref 136–145)
TRIGL SERPL-MCNC: 184 MG/DL (ref 0–150)
VLDLC SERPL-MCNC: 31 MG/DL (ref 5–40)
WBC NRBC COR # BLD AUTO: 11.39 10*3/MM3 (ref 3.4–10.8)

## 2025-05-21 PROCEDURE — 86003 ALLG SPEC IGE CRUDE XTRC EA: CPT

## 2025-05-21 PROCEDURE — 87484 EHRLICHA CHAFFEENSIS AMP PRB: CPT

## 2025-05-21 PROCEDURE — 86008 ALLG SPEC IGE RECOMB EA: CPT

## 2025-05-21 PROCEDURE — 87798 DETECT AGENT NOS DNA AMP: CPT

## 2025-05-21 PROCEDURE — 86757 RICKETTSIA ANTIBODY: CPT

## 2025-05-21 PROCEDURE — 85025 COMPLETE CBC W/AUTO DIFF WBC: CPT

## 2025-05-21 PROCEDURE — 80053 COMPREHEN METABOLIC PANEL: CPT

## 2025-05-21 PROCEDURE — 86618 LYME DISEASE ANTIBODY: CPT

## 2025-05-21 PROCEDURE — 36415 COLL VENOUS BLD VENIPUNCTURE: CPT

## 2025-05-21 PROCEDURE — 80061 LIPID PANEL: CPT

## 2025-05-21 PROCEDURE — 82785 ASSAY OF IGE: CPT

## 2025-05-21 PROCEDURE — 86140 C-REACTIVE PROTEIN: CPT

## 2025-05-21 PROCEDURE — 87468 ANAPLSMA PHGCYTOPHLM AMP PRB: CPT

## 2025-05-21 PROCEDURE — 85652 RBC SED RATE AUTOMATED: CPT

## 2025-05-22 LAB — B BURGDOR IGG+IGM SER QL IA: NEGATIVE

## 2025-05-24 LAB — RICKETTSIA RICKETTSII DNA, RT: NOT DETECTED

## 2025-05-25 LAB
ALPHA-GAL IGE QN: 36.5 KU/L
BEEF IGE QN: 19.5 KU/L
CONV CLASS DESCRIPTION: ABNORMAL
IGE SERPL-ACNC: 1150 IU/ML (ref 6–495)
LAMB IGE QN: 12.2 KU/L
PORK IGE QN: 7.17 KU/L

## 2025-05-26 LAB
A PHAGOCYTOPH DNA BLD QL NAA+PROBE: NEGATIVE
EHRLICHIA DNA SPEC QL NAA+PROBE: NEGATIVE

## 2025-05-27 ENCOUNTER — TELEPHONE (OUTPATIENT)
Dept: INTERNAL MEDICINE | Age: 80
End: 2025-05-27

## 2025-05-27 LAB
RESULT COMMENT:: NORMAL
RICK SF IGG TITR SER: NORMAL {TITER}
RICK SF IGM TITR SER: NORMAL {TITER}

## 2025-05-27 NOTE — TELEPHONE ENCOUNTER
"Caller: Maryam Sandoval \"Rosie\"    Relationship: Self    Best call back number: [phone]     Caller requesting test results: PATIENT    What test was performed: LABS    When was the test performed: 5/21/25    Where was the test performed:     Additional notes: PATIENT WOULD LIKE FOR PCP TO RETURN HER CALL TO DISCUSS THE LAB RESULTS  "

## 2025-06-04 ENCOUNTER — HOSPITAL ENCOUNTER (OUTPATIENT)
Dept: MAMMOGRAPHY | Facility: HOSPITAL | Age: 80
Discharge: HOME | End: 2025-06-04
Payer: MEDICARE

## 2025-06-04 DIAGNOSIS — Z12.31 SCREENING MAMMOGRAM FOR BREAST CANCER: ICD-10-CM

## 2025-06-04 PROCEDURE — 77067 SCR MAMMO BI INCL CAD: CPT

## 2025-06-04 PROCEDURE — 77063 BREAST TOMOSYNTHESIS BI: CPT

## 2025-06-05 ENCOUNTER — OFFICE VISIT (OUTPATIENT)
Dept: INTERNAL MEDICINE | Age: 80
End: 2025-06-05
Payer: MEDICARE

## 2025-06-05 VITALS
SYSTOLIC BLOOD PRESSURE: 126 MMHG | HEIGHT: 65 IN | RESPIRATION RATE: 18 BRPM | BODY MASS INDEX: 27.32 KG/M2 | WEIGHT: 164 LBS | DIASTOLIC BLOOD PRESSURE: 82 MMHG | HEART RATE: 62 BPM | TEMPERATURE: 97.6 F | OXYGEN SATURATION: 98 %

## 2025-06-05 DIAGNOSIS — Z09 ENCOUNTER FOR FOLLOW-UP EXAMINATION AFTER COMPLETED TREATMENT FOR CONDITIONS OTHER THAN MALIGNANT NEOPLASM: ICD-10-CM
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
## 2025-06-05 DIAGNOSIS — Z91.018 ALLERGY TO ALPHA-GAL: Primary | ICD-10-CM

## 2025-06-05 DIAGNOSIS — N95.1 POST MENOPAUSAL SYNDROME: ICD-10-CM

## 2025-06-05 RX ORDER — MULTIPLE VITAMINS W/ MINERALS TAB 9MG-400MCG
1 TAB ORAL DAILY
COMMUNITY

## 2025-06-05 NOTE — PROGRESS NOTES
Chief Complaint  Primary Care Follow-Up (79 year old female here today for a routine follow up and lab review./States she still has some fatigue from the tick bite but states that overall she is getting better /She would lie to discuss Acupuncture for the Alpha Gal. )    Primary Care Follow-Up    SUBJECTIVE  Maryam Sandoval presents to [facility]     History of Present Illness  The patient presents for f/u on tick bite and alpha-gal syndrome.    She has finished about 2 weeks worth of doxycycline and is starting tof eel better.    She reports an improvement in her condition, although she continues to experience fatigue. She has been diagnosed with alpha-gal syndrome, which has necessitated dietary modifications, including the elimination of red meat, pork, and lamb from her diet. She expresses a desire to avoid anaphylactic reactions and is not interested in pursuing allergy medications or injections. Her diet primarily consists of chicken and fish, with occasional consumption of hamburgers and steak, approximately twice a year each. She tolerates dairy products well, as evidenced by her ability to consume milk in her coffee without adverse reactions. She is considering acupuncture as a potential treatment option for her alpha-gal syndrome.    She is scheduled to see CHEN Rosales, in July and is unsure if she needs to have another PATRICK test before the appointment. She has had to cancel the appointment a couple of times. The last test was done in 04/2025, and her scores are always pretty high.      Past Medical History:   Diagnosis Date    Acne rosacea 11/02/2021    Allergic [facility] crud    Arthritis     Asthma 11/02/2021    Asthma episode with extreme mold exposure     Bronchospasm 11/02/2021    Cholelithiasis     Colon polyp     Diverticulitis 11/02/2021 8/2005. On CT scan     Diverticulitis of colon     Diverticulosis mostly controlled    Flu     Hernia      "Histoplasmosis 11/02/2021 1977    Hyperlipidemia 11/02/2021    Irritable bowel syndrome     Ovarian cyst 11/02/2021 1993    Pancreatitis two episodes    Ulcerative colitis       Family History   Problem Relation Age of Onset    Irritable bowel syndrome Mother     Cirrhosis Father     Alcohol abuse Father     Early death Father     Cancer Brother     Malig Hyperthermia Neg Hx       Past Surgical History:   Procedure Laterality Date    APPENDECTOMY      BLEPHAROPLASTY Bilateral 12/19/2017    Procedure: BILATERAL  BLEPHAROPLASTY LOWER LID ;  Surgeon: Zachery Fermin MD;  [place] [facility]  Service:     COLON RESECTION      COLON SURGERY  maybe 2010    COLONOSCOPY      ENTROPIAN REPAIR Bilateral 12/19/2017    Procedure: RIGHT LOWER LID ENTROPION REPAIR, LEFT LOWER LID ECTROPION REPAIR;  Surgeon: Zachery Fermin MD;  [place] [facility] [place] [facility];  Service:     HERNIA REPAIR      JOINT REPLACEMENT      KNEE ARTHROPLASTY Right     LAPAROSCOPIC CHOLECYSTECTOMY          Current Outpatient Medications:     ezetimibe (Zetia) 10 MG tablet, Take 1 tablet by mouth Daily., Disp: 90 tablet, Rfl: 2    magnesium oxide (MAG-OX) 400 MG tablet, Take 1 tablet by mouth Daily., Disp: , Rfl:     montelukast (SINGULAIR) 10 MG tablet, Take 1 tablet by mouth Daily., Disp: 90 tablet, Rfl: 3    multivitamin with minerals tablet tablet, Take 1 tablet by mouth Daily., Disp: , Rfl:     simvastatin (ZOCOR) 40 MG tablet, Take 1 tablet by mouth every night at bedtime., Disp: 90 tablet, Rfl: 3    vitamin D (ERGOCALCIFEROL) 1.25 MG (21114 UT) capsule capsule, Take 1 capsule by mouth Every 7 (Seven) Days., Disp: 12 capsule, Rfl: 3    OBJECTIVE  Vital Signs:   /82 (BP Location: Left arm, Patient Position: Sitting)   Pulse 62   Temp 97.6 °F (36.4 °C) (Temporal)   Resp 18   Ht 165.1 cm (65\")   Wt 74.4 kg (164 lb)   SpO2 98%   BMI 27.29 kg/m²    Estimated body mass index is 27.29 kg/m² as calculated from the following:    " "Height as of this encounter: 165.1 cm (65\").    Weight as of this encounter: 74.4 kg (164 lb).     Wt Readings from Last 3 Encounters:   06/05/25 74.4 kg (164 lb)   05/19/25 75.3 kg (166 lb)   03/06/25 79.4 kg (175 lb)     BP Readings from Last 3 Encounters:   06/05/25 126/82   05/19/25 122/68   03/06/25 124/72       Physical Exam  Vitals and nursing note reviewed.   Constitutional:       Appearance: Normal appearance.   HENT:      Head: Normocephalic.   Eyes:      Extraocular Movements: Extraocular movements intact.      Conjunctiva/sclera: Conjunctivae normal.   Cardiovascular:      Rate and Rhythm: Normal rate and regular rhythm.      Heart sounds: Normal heart sounds. No murmur heard.  Pulmonary:      Effort: Pulmonary effort is normal.      Breath sounds: Normal breath sounds. No wheezing or rhonchi.   Abdominal:      General: Bowel sounds are normal.      Palpations: Abdomen is soft.   Musculoskeletal:         General: No swelling. Normal range of motion.      Cervical back: Normal range of motion and neck supple.   Skin:     General: Skin is warm and dry.   Neurological:      General: No focal deficit present.      Mental Status: She is alert and oriented to person, place, and time. Mental status is at baseline.   Psychiatric:         Mood and Affect: Mood normal.         Behavior: Behavior normal.         Thought Content: Thought content normal.         Judgment: Judgment normal.          Result Review    CMP          8/29/2024    09:21 3/3/2025    09:39 5/21/2025    09:00   CMP   Glucose 85  90  104    BUN 13  13  10    Creatinine 0.81  0.75  0.76    EGFR 74.9  81.6  80.3    Sodium 141  140  141    Potassium 4.5  4.3  4.1    Chloride 106  103  104    Calcium 10.2  10.1  10.5    Total Protein 7.0  6.8  7.3    Albumin 4.5  4.3  4.3    Globulin 2.5  2.5  3.0    Total Bilirubin 0.6  0.5  0.5    Alkaline Phosphatase 142  156  147    AST (SGOT) 24  24  18    ALT (SGPT) 24  22  21    Albumin/Globulin Ratio 1.8  " 1.7  1.4    BUN/Creatinine Ratio 16.0  17.3  13.2    Anion Gap 9.6  11.8  12.2      CBC          8/29/2024    09:21 3/3/2025    09:39 5/21/2025    09:00   CBC   WBC 7.12  8.17  11.39    RBC 4.58  4.63  4.49    Hemoglobin 14.8  14.8  14.3    Hematocrit 43.1  44.0  43.6    MCV 94.1  95.0  97.1    MCH 32.3  32.0  31.8    MCHC 34.3  33.6  32.8    RDW 12.7  12.2  12.1    Platelets 205  220  276      C Reactive Protein          5/21/2025    09:00   Common Labs   C-Reactive Protein 1.75        Mammo Screening Digital Tomosynthesis Bilateral With CAD  Result Date: 6/4/2025  No mammographic evidence of malignancy.  Recommend annual screening mammography.  BI-RADS ASSESSMENT: BI-RADS 2. Benign findings.  Note:  It has been reported that there is approximately a 15% false negative rate in mammography.  Therefore, management of a palpable abnormality should not be deferred because of a negative mammogram.  6/4/2025 4:40 PM by ARMEN VIRK MD on Workstation: [id number]         The above data has been reviewed by CHEN Mcintosh 06/05/2025 12:44 EDT.          Patient Care Team:  Sofy Branch APRN as PCP - General (Internal Medicine)  Tata Mendez APRN as Nurse Practitioner (Nurse Practitioner)            ASSESSMENT & PLAN    Diagnoses and all orders for this visit:    1. Allergy to alpha-gal (Primary)    2. Tick bite, unspecified site, subsequent encounter  -     C-reactive protein; Future    3. Post menopausal syndrome  -     DEXA Bone Density Axial    4. Encounter for follow-up examination after completed treatment for conditions other than malignant neoplasm  -     DEXA Bone Density Axial         Assessment & Plan  1. Alpha-gal syndrome.  - IgE levels are significantly elevated, approximately double the normal range.  - Advised to avoid red meat, including beef, lamb, pork, and ham.  - Acupuncture discussed as a potential treatment option, but its efficacy is variable.  - Follow-up lab test to be conducted in 6  months to monitor IgE levels.    2. Non-alcoholic steatohepatitis (PATRICK).  - PATRICK scores have consistently been high.  - Physical exam findings and test results not explicitly discussed.  - A message will be sent to Yohana Christensen to determine if another PATRICK test is needed before the appointment in July.  - No specific treatment changes mentioned.    3. Health Maintenance.  - Routine labs to be done in a few weeks to ensure all markers are returning to normal.  - Bone density scan will be scheduled.  - No additional health maintenance concerns discussed.      Tobacco Use: Low Risk  (6/5/2025)    Patient History     Smoking Tobacco Use: Never     Smokeless Tobacco Use: Never     Passive Exposure: Not on file       Follow Up     Return for Next scheduled follow up.    Please note that portions of this note were completed with a voice recognition program.    Patient was given instructions and counseling regarding her condition or for health maintenance advice. Please see specific information pulled into the AVS if appropriate.   I have reviewed information obtained and documented by others and I have confirmed the accuracy of this documented note.    CHEN Mcintosh    Patient or patient representative verbalized consent for the use of Ambient Listening during the visit with  CHEN Mcintosh for chart documentation. 6/5/2025  12:46 EDT

## 2025-07-18 ENCOUNTER — PATIENT MESSAGE (OUTPATIENT)
Dept: INTERNAL MEDICINE | Age: 80
End: 2025-07-18
Payer: MEDICARE

## 2025-07-18 DIAGNOSIS — Z91.014 ALPHA-GAL SYNDROME: Primary | ICD-10-CM

## 2025-07-21 ENCOUNTER — CLINICAL SUPPORT (OUTPATIENT)
Dept: INTERNAL MEDICINE | Age: 80
End: 2025-07-21
Payer: MEDICARE
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
## 2025-07-21 DIAGNOSIS — E55.9 VITAMIN D DEFICIENCY: ICD-10-CM

## 2025-07-21 DIAGNOSIS — K76.0 HEPATIC STEATOSIS: ICD-10-CM

## 2025-07-21 DIAGNOSIS — I25.83 CORONARY ATHEROSCLEROSIS DUE TO LIPID RICH PLAQUE: ICD-10-CM

## 2025-07-21 DIAGNOSIS — E83.41 HYPERMAGNESEMIA: ICD-10-CM

## 2025-07-21 DIAGNOSIS — Z91.014 ALPHA-GAL SYNDROME: ICD-10-CM

## 2025-07-21 DIAGNOSIS — E78.2 MIXED HYPERLIPIDEMIA: ICD-10-CM

## 2025-07-21 LAB
25(OH)D3 SERPL-MCNC: 35.1 NG/ML (ref 30–100)
ALBUMIN SERPL-MCNC: 4.3 G/DL (ref 3.5–5.2)
ALBUMIN/GLOB SERPL: 1.5 G/DL
ALP SERPL-CCNC: 176 U/L (ref 39–117)
ALT SERPL W P-5'-P-CCNC: 18 U/L (ref 1–33)
ANION GAP SERPL CALCULATED.3IONS-SCNC: 12.6 MMOL/L (ref 5–15)
AST SERPL-CCNC: 21 U/L (ref 1–32)
BASOPHILS # BLD AUTO: 0.05 10*3/MM3 (ref 0–0.2)
BASOPHILS NFR BLD AUTO: 0.6 % (ref 0–1.5)
BILIRUB SERPL-MCNC: 0.4 MG/DL (ref 0–1.2)
BUN SERPL-MCNC: 13 MG/DL (ref 8–23)
BUN/CREAT SERPL: 17.1 (ref 7–25)
CALCIUM SPEC-SCNC: 10.3 MG/DL (ref 8.6–10.5)
CHLORIDE SERPL-SCNC: 105 MMOL/L (ref 98–107)
CO2 SERPL-SCNC: 24.4 MMOL/L (ref 22–29)
CREAT SERPL-MCNC: 0.76 MG/DL (ref 0.57–1)
CRP SERPL-MCNC: <0.3 MG/DL (ref 0–0.5)
DEPRECATED RDW RBC AUTO: 44.2 FL (ref 37–54)
EGFRCR SERPLBLD CKD-EPI 2021: 80.3 ML/MIN/1.73
EOSINOPHIL # BLD AUTO: 0.18 10*3/MM3 (ref 0–0.4)
EOSINOPHIL NFR BLD AUTO: 2 % (ref 0.3–6.2)
ERYTHROCYTE [DISTWIDTH] IN BLOOD BY AUTOMATED COUNT: 12.9 % (ref 12.3–15.4)
FERRITIN SERPL-MCNC: 380 NG/ML (ref 13–150)
FOLATE SERPL-MCNC: >20 NG/ML (ref 4.78–24.2)
GLOBULIN UR ELPH-MCNC: 2.8 GM/DL
GLUCOSE SERPL-MCNC: 86 MG/DL (ref 65–99)
HCT VFR BLD AUTO: 42.4 % (ref 34–46.6)
HGB BLD-MCNC: 14.2 G/DL (ref 12–15.9)
IMM GRANULOCYTES # BLD AUTO: 0.03 10*3/MM3 (ref 0–0.05)
IMM GRANULOCYTES NFR BLD AUTO: 0.3 % (ref 0–0.5)
IRON 24H UR-MRATE: 80 MCG/DL (ref 37–145)
IRON SATN MFR SERPL: 21 % (ref 20–50)
LYMPHOCYTES # BLD AUTO: 2.21 10*3/MM3 (ref 0.7–3.1)
LYMPHOCYTES NFR BLD AUTO: 25.1 % (ref 19.6–45.3)
MAGNESIUM SERPL-MCNC: 2.3 MG/DL (ref 1.6–2.4)
MCH RBC QN AUTO: 31.4 PG (ref 26.6–33)
MCHC RBC AUTO-ENTMCNC: 33.5 G/DL (ref 31.5–35.7)
MCV RBC AUTO: 93.8 FL (ref 79–97)
MONOCYTES # BLD AUTO: 0.78 10*3/MM3 (ref 0.1–0.9)
MONOCYTES NFR BLD AUTO: 8.9 % (ref 5–12)
NEUTROPHILS NFR BLD AUTO: 5.56 10*3/MM3 (ref 1.7–7)
NEUTROPHILS NFR BLD AUTO: 63.1 % (ref 42.7–76)
NRBC BLD AUTO-RTO: 0 /100 WBC (ref 0–0.2)
PLATELET # BLD AUTO: 222 10*3/MM3 (ref 140–450)
PMV BLD AUTO: 8.9 FL (ref 6–12)
POTASSIUM SERPL-SCNC: 4.3 MMOL/L (ref 3.5–5.2)
PROT SERPL-MCNC: 7.1 G/DL (ref 6–8.5)
RBC # BLD AUTO: 4.52 10*6/MM3 (ref 3.77–5.28)
SODIUM SERPL-SCNC: 142 MMOL/L (ref 136–145)
TIBC SERPL-MCNC: 375 MCG/DL (ref 298–536)
TRANSFERRIN SERPL-MCNC: 252 MG/DL (ref 200–360)
TSH SERPL DL<=0.05 MIU/L-ACNC: 1.72 UIU/ML (ref 0.27–4.2)
VIT B12 BLD-MCNC: 578 PG/ML (ref 211–946)
WBC NRBC COR # BLD AUTO: 8.81 10*3/MM3 (ref 3.4–10.8)

## 2025-07-21 PROCEDURE — 36415 COLL VENOUS BLD VENIPUNCTURE: CPT

## 2025-07-21 PROCEDURE — 86003 ALLG SPEC IGE CRUDE XTRC EA: CPT

## 2025-07-21 PROCEDURE — 83540 ASSAY OF IRON: CPT

## 2025-07-21 PROCEDURE — 80053 COMPREHEN METABOLIC PANEL: CPT

## 2025-07-21 PROCEDURE — 82306 VITAMIN D 25 HYDROXY: CPT

## 2025-07-21 PROCEDURE — 83735 ASSAY OF MAGNESIUM: CPT

## 2025-07-21 PROCEDURE — 82728 ASSAY OF FERRITIN: CPT

## 2025-07-21 PROCEDURE — 86008 ALLG SPEC IGE RECOMB EA: CPT

## 2025-07-21 PROCEDURE — 86140 C-REACTIVE PROTEIN: CPT

## 2025-07-21 PROCEDURE — 85025 COMPLETE CBC W/AUTO DIFF WBC: CPT

## 2025-07-21 PROCEDURE — 82785 ASSAY OF IGE: CPT

## 2025-07-21 PROCEDURE — 82746 ASSAY OF FOLIC ACID SERUM: CPT

## 2025-07-21 PROCEDURE — 82607 VITAMIN B-12: CPT

## 2025-07-21 PROCEDURE — 84466 ASSAY OF TRANSFERRIN: CPT

## 2025-07-21 PROCEDURE — 84443 ASSAY THYROID STIM HORMONE: CPT

## 2025-07-24 ENCOUNTER — OFFICE VISIT (OUTPATIENT)
Dept: INTERNAL MEDICINE | Age: 80
End: 2025-07-24
Payer: MEDICARE

## 2025-07-24 VITALS
BODY MASS INDEX: 27.16 KG/M2 | OXYGEN SATURATION: 96 % | TEMPERATURE: 97.6 F | WEIGHT: 163 LBS | SYSTOLIC BLOOD PRESSURE: 120 MMHG | HEART RATE: 70 BPM | HEIGHT: 65 IN | DIASTOLIC BLOOD PRESSURE: 68 MMHG

## 2025-07-24 DIAGNOSIS — Z91.018 ALLERGY TO ALPHA-GAL: ICD-10-CM

## 2025-07-24 DIAGNOSIS — R42 DIZZINESS: ICD-10-CM

## 2025-07-24 DIAGNOSIS — Z91.014 ALLERGY TO MAMMALIAN MEATS: ICD-10-CM

## 2025-07-24 DIAGNOSIS — R74.8 ELEVATED ALKALINE PHOSPHATASE LEVEL: ICD-10-CM

## 2025-07-24 DIAGNOSIS — E78.2 MIXED HYPERLIPIDEMIA: Primary | ICD-10-CM

## 2025-07-24 DIAGNOSIS — I25.83 CORONARY ATHEROSCLEROSIS DUE TO LIPID RICH PLAQUE: ICD-10-CM

## 2025-07-24 LAB
ALPHA-GAL IGE QN: 23.4 KU/L
BEEF IGE QN: 11.9 KU/L
IGE SERPL-ACNC: 698 IU/ML (ref 6–495)
LAMB IGE QN: 9.11 KU/L
PORK IGE QN: 4.83 KU/L
SERVICE CMNT-IMP: ABNORMAL

## 2025-07-24 PROCEDURE — 1160F RVW MEDS BY RX/DR IN RCRD: CPT

## 2025-07-24 PROCEDURE — 1126F AMNT PAIN NOTED NONE PRSNT: CPT

## 2025-07-24 PROCEDURE — 99214 OFFICE O/P EST MOD 30 MIN: CPT

## 2025-07-24 PROCEDURE — G2211 COMPLEX E/M VISIT ADD ON: HCPCS

## 2025-07-24 PROCEDURE — 1159F MED LIST DOCD IN RCRD: CPT

## 2025-07-24 RX ORDER — EPINEPHRINE 0.3 MG/.3ML
0.3 INJECTION SUBCUTANEOUS ONCE
Qty: 1 EACH | Refills: 0 | Status: SHIPPED | OUTPATIENT
Start: 2025-07-24 | End: 2025-07-24

## 2025-07-24 NOTE — PROGRESS NOTES
Chief Complaint  Primary Care Follow-Up (79 year old female here for 4 month follow up.  Patient needs labs reviewed.  Patient says she does not seem to be as steady as she use to be.  )    History of Present Illness  SUBJECTIVE  Maryam Sandoval presents to [facility]     History of Present Illness  The patient presents for evaluation of elevated alkaline phosphatase, alpha-gal syndrome, balance issues, and nonalcoholic cirrhosis.    She has been adhering to a diet devoid of red meat, although she does consume dairy products in moderation. She reports that avoiding red meat is not difficult, but limiting dairy is more challenging. Despite dietary changes, she has not seen a decrease in ferritin levels. She is scheduled to see Yohana next month and has a bone scan scheduled for 08/25/2025. She was previously diagnosed with nonalcoholic cirrhosis and fatty liver. She reports no need for an EpiPen as she has not experienced any allergic reactions. She has not experienced any nausea, vomiting, or diarrhea since eliminating red meat and fatty foods from her diet. She reports no headaches, falls, head injuries, or palpitations. She also reports no cognitive decline or concerns about dementia.    She has been experiencing balance issues for the past month, which she attributes to aging. She has not been exercising as regularly due to hot weather conditions. She reports no muscle weakness.    She has a history of chigger bites and spider bites but reports no neck pain. She has always had slight swelling in her neck, which becomes more noticeable when she consumes sour foods. She reports no difficulty in eating or swallowing.    Social History:  Diet: She avoids red meat and consumes dairy in moderation.  Coffee/Tea/Caffeine-containing Drinks: She consumes coffee with milk.      Past Medical History:   Diagnosis Date    Acne rosacea 11/02/2021    Allergic [facility] crud     Arthritis     Asthma 11/02/2021    Asthma episode with extreme mold exposure     Bronchospasm 11/02/2021    Cholelithiasis     Colon polyp     Diverticulitis 11/02/2021 8/2005. 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    Hyperlipidemia 11/02/2021    Irritable bowel syndrome     Ovarian cyst 11/02/2021 1993    Pancreatitis two episodes    Ulcerative colitis       Family History   Problem Relation Age of Onset    Irritable bowel syndrome Mother     Cirrhosis Father     Alcohol abuse Father     Early death Father     Cancer Brother     Malig Hyperthermia Neg Hx       Past Surgical History:   Procedure Laterality Date    APPENDECTOMY      BLEPHAROPLASTY Bilateral 12/19/2017    Procedure: BILATERAL  BLEPHAROPLASTY LOWER LID ;  Surgeon: Zachery Fermin MD;  Location: [facility] OR [facility] [place];  Service:     COLON RESECTION      COLON SURGERY  maybe 2010    COLONOSCOPY      ENTROPIAN REPAIR Bilateral 12/19/2017    Procedure: RIGHT LOWER LID ENTROPION REPAIR, LEFT LOWER LID ECTROPION REPAIR;  Surgeon: Zachery Fermin MD;  Location: [facility] OR [facility] [place];  Service:     HERNIA REPAIR      JOINT REPLACEMENT      KNEE ARTHROPLASTY Right     LAPAROSCOPIC CHOLECYSTECTOMY          Current Outpatient Medications:     ezetimibe (Zetia) 10 MG tablet, Take 1 tablet by mouth Daily., Disp: 90 tablet, Rfl: 2    magnesium oxide (MAG-OX) 400 MG tablet, Take 1 tablet by mouth Daily., Disp: , Rfl:     montelukast (SINGULAIR) 10 MG tablet, Take 1 tablet by mouth Daily., Disp: 90 tablet, Rfl: 3    multivitamin with minerals tablet tablet, Take 1 tablet by mouth Daily., Disp: , Rfl:     simvastatin (ZOCOR) 40 MG tablet, Take 1 tablet by mouth every night at bedtime., Disp: 90 tablet, Rfl: 3    vitamin D (ERGOCALCIFEROL) 1.25 MG (84503 UT) capsule capsule, Take 1 capsule by mouth Every 7 (Seven) Days., Disp: 12 capsule, Rfl: 3    EPINEPHrine (EpiPen 2-Molina)  "0.3 MG/0.3ML solution auto-injector injection, Inject 0.3 mL into the appropriate muscle as directed by prescriber 1 (One) Time for 1 dose., Disp: 1 each, Rfl: 0    OBJECTIVE  Vital Signs:   /68 (BP Location: Left arm, Patient Position: Sitting, Cuff Size: Small Adult)   Pulse 70   Temp 97.6 °F (36.4 °C)   Ht 165.1 cm (65\")   Wt 73.9 kg (163 lb)   SpO2 96%   BMI 27.12 kg/m²    Estimated body mass index is 27.12 kg/m² as calculated from the following:    Height as of this encounter: 165.1 cm (65\").    Weight as of this encounter: 73.9 kg (163 lb).     Wt Readings from Last 3 Encounters:   07/24/25 73.9 kg (163 lb)   06/05/25 74.4 kg (164 lb)   05/19/25 75.3 kg (166 lb)     BP Readings from Last 3 Encounters:   07/24/25 120/68   06/05/25 126/82   05/19/25 122/68       Physical Exam  Vitals and nursing note reviewed.   Constitutional:       Appearance: Normal appearance.   HENT:      Head: Normocephalic.   Eyes:      Extraocular Movements: Extraocular movements intact.      Conjunctiva/sclera: Conjunctivae normal.   Cardiovascular:      Rate and Rhythm: Normal rate and regular rhythm.      Heart sounds: Normal heart sounds. No murmur heard.  Pulmonary:      Effort: Pulmonary effort is normal.      Breath sounds: Normal breath sounds. No wheezing or rales.   Abdominal:      General: Bowel sounds are normal.      Palpations: Abdomen is soft.   Musculoskeletal:         General: No swelling. Normal range of motion.   Skin:     General: Skin is warm and dry.   Neurological:      General: No focal deficit present.      Mental Status: She is alert. Mental status is at baseline.   Psychiatric:         Mood and Affect: Mood normal.         Behavior: Behavior normal.         Thought Content: Thought content normal.         Judgment: Judgment normal.          Result Review    CMP          3/3/2025    09:39 5/21/2025    09:00 7/21/2025    09:17   CMP   Glucose 90  104  86    BUN 13  10  13.0    Creatinine 0.75  0.76  " "0.76    EGFR 81.6  80.3  80.3    Sodium 140  141  142    Potassium 4.3  4.1  4.3    Chloride 103  104  105    Calcium 10.1  10.5  10.3    Total Protein 6.8  7.3  7.1    Albumin 4.3  4.3  4.3    Globulin 2.5  3.0  2.8    Total Bilirubin 0.5  0.5  0.4    Alkaline Phosphatase 156  147  176    AST (SGOT) 24  18  21    ALT (SGPT) 22  21  18    Albumin/Globulin Ratio 1.7  1.4  1.5    BUN/Creatinine Ratio 17.3  13.2  17.1    Anion Gap 11.8  12.2  12.6      CBC w/diff          3/3/2025    09:39 5/21/2025    09:00 7/21/2025    09:17   CBC w/Diff   WBC 8.17  11.39  8.81    RBC 4.63  4.49  4.52    Hemoglobin 14.8  14.3  14.2    Hematocrit 44.0  43.6  42.4    MCV 95.0  97.1  93.8    MCH 32.0  31.8  31.4    MCHC 33.6  32.8  33.5    RDW 12.2  12.1  12.9    Platelets 220  276  222    Neutrophil Rel % 60.6  70.2  63.1    Immature Granulocyte Rel % 0.4  0.4  0.3    Lymphocyte Rel % 25.0  18.7  25.1    Monocyte Rel % 10.8  8.3  8.9    Eosinophil Rel % 2.2  1.8  2.0    Basophil Rel % 1.0  0.6  0.6      Lipid Panel          3/3/2025    09:39 4/16/2025    08:49 5/21/2025    09:00   Lipid Panel   Total Cholesterol 155   118    Triglycerides 223  202  184    HDL Cholesterol 38   34    VLDL Cholesterol 37   31    LDL Cholesterol  80   53    LDL/HDL Ratio 1.91   1.39      TSH          8/29/2024    09:21 3/3/2025    09:39 7/21/2025    09:17   TSH   TSH 1.060  1.670  1.720              No components found for: \"TZAO88RS\"  No components found for: \"FREET4\"  No components found for: \"HXVAITUV69\"    Mammo Screening Digital Tomosynthesis Bilateral With CAD  Result Date: 6/4/2025  No mammographic evidence of malignancy.  Recommend annual screening mammography.  BI-RADS ASSESSMENT: BI-RADS 2. Benign findings.  Note:  It has been reported that there is approximately a 15% false negative rate in mammography.  Therefore, management of a palpable abnormality should not be deferred because of a negative mammogram.  6/4/2025 4:40 PM by ARMEN VIRK MD " on Workstation: MICHELE         The above data has been reviewed by CHEN Mcintosh 07/24/2025 13:37 EDT.          Patient Care Team:  Sofy Branch APRN as PCP - General (Internal Medicine)  Tata Mendez APRN as Nurse Practitioner (Nurse Practitioner)            ASSESSMENT & PLAN    Diagnoses and all orders for this visit:    1. Mixed hyperlipidemia (Primary)  Assessment & Plan:   Lipid abnormalities are improving with lifestyle modifications    Plan:  Continue same medication/s without change.      Discussed medication dosage, use, side effects, and goals of treatment in detail.    Counseled patient on lifestyle modifications to help control hyperlipidemia.     Patient Treatment Goals:   LDL goal is less than 70    Followup in 6 months.    Orders:  -     CBC & Differential; Future  -     Comprehensive Metabolic Panel; Future  -     Lipid Panel; Future  -     TSH Rfx On Abnormal To Free T4; Future  -     Vitamin B12 & Folate; Future    2. Coronary atherosclerosis due to lipid rich plaque  Assessment & Plan:  Mild coronary artery atherosclerosis noted on CT  Patient denies any chest pain, palpitations, soa.  Continue with healthy diet, regular exercise, daily asa and antihyperlipidemics.       3. Allergy to alpha-gal  -     EPINEPHrine (EpiPen 2-Molina) 0.3 MG/0.3ML solution auto-injector injection; Inject 0.3 mL into the appropriate muscle as directed by prescriber 1 (One) Time for 1 dose.  Dispense: 1 each; Refill: 0  -     Alpha-Gal IgE Panel; Future    4. Dizziness  -     CT Head Without Contrast; Future    5. 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 Elevated alkaline phosphatase level  -     Gamma GT; Future         Assessment & Plan  1. Elevated alkaline phosphatase.  - Alkaline phosphatase levels have increased, potentially indicating a blood, liver, or bone issue.  - Labs show elevated alkaline phosphatase; other liver functions are intact.  - Discussed possible causes and  the need for further investigation.  - May need a bone scan    2. Alpha-gal syndrome.  - Alpha-gal numbers are improving with dietary changes.  - CRP levels have returned to normal; cholesterol levels are within normal range.  - Reviewed ferritin levels, which remain high; discussed potential need for phlebotomy if levels continue to rise.  - Advised to continue avoiding red meat and limit dairy intake.    3. Balance issues.  - Experiencing balance issues for the past month, possibly due to aging and reduced physical activity.  - Physical exam noted no falls or head injuries; no palpitations causing dizziness.  - Discussed the need for a CT scan of the head to rule out potential causes.  - CT scan of the head ordered to investigate balance issues.    4. Nonalcoholic cirrhosis.  - History of nonalcoholic cirrhosis; liver functions intact except for elevated alkaline phosphatase.  - Labs reviewed; liver functions are stable.  - Discussed dietary management, including avoiding red meat and fatty foods.  - Continued monitoring of liver function and dietary adherence advised.    Follow-up: The patient will follow up in 4 months.      Tobacco Use: Low Risk  (7/24/2025)    Patient History     Smoking Tobacco Use: Never     Smokeless Tobacco Use: Never     Passive Exposure: Not on file       Follow Up     Return in about 4 months (around 11/24/2025).    Please note that portions of this note were completed with a voice recognition program.    Patient was given instructions and counseling regarding her condition or for health maintenance advice. Please see specific information pulled into the AVS if appropriate.   I have reviewed information obtained and documented by others and I have confirmed the accuracy of this documented note.    CHEN Mcintosh    Patient or patient representative verbalized consent for the use of Ambient Listening during the visit with  CHEN Mcintosh for chart documentation. 7/24/2025  15:19  EDT

## 2025-08-05 ENCOUNTER — HOSPITAL ENCOUNTER (OUTPATIENT)
Dept: CT IMAGING | Facility: HOSPITAL | Age: 80
Discharge: HOME | End: 2025-08-05
Payer: MEDICARE

## 2025-08-05 DIAGNOSIS — R42 DIZZINESS: ICD-10-CM

## 2025-08-05 PROCEDURE — 70450 CT HEAD/BRAIN W/O DYE: CPT

## 2025-08-13 ENCOUNTER — OFFICE VISIT (OUTPATIENT)
Dept: GASTROENTEROLOGY | Facility: CLINIC | Age: 80
End: 2025-08-13
Payer: MEDICARE

## 2025-08-13 VITALS
HEART RATE: 70 BPM | BODY MASS INDEX: 27.32 KG/M2 | SYSTOLIC BLOOD PRESSURE: 114 MMHG | DIASTOLIC BLOOD PRESSURE: 47 MMHG | HEIGHT: 65 IN | WEIGHT: 164 LBS

## 2025-08-13 DIAGNOSIS — Z87.19 HISTORY OF PANCREATITIS: ICD-10-CM

## 2025-08-13 DIAGNOSIS — R74.8 ELEVATED LIVER ENZYMES: ICD-10-CM

## 2025-08-13 DIAGNOSIS — K57.90 DIVERTICULOSIS: ICD-10-CM

## 2025-08-13 DIAGNOSIS — K76.0 HEPATIC STEATOSIS: Primary | ICD-10-CM

## 2025-08-13 DIAGNOSIS — Z91.014 ALPHA-GAL SYNDROME: ICD-10-CM

## 2025-08-13 PROCEDURE — 1159F MED LIST DOCD IN RCRD: CPT | Performed by: NURSE PRACTITIONER

## 2025-08-13 PROCEDURE — 99214 OFFICE O/P EST MOD 30 MIN: CPT | Performed by: NURSE PRACTITIONER

## 2025-08-13 PROCEDURE — 1160F RVW MEDS BY RX/DR IN RCRD: CPT | Performed by: NURSE PRACTITIONER

## 2025-08-13 RX ORDER — CHLORAL HYDRATE 500 MG
1000 CAPSULE ORAL
COMMUNITY

## 2025-08-25 ENCOUNTER — HOSPITAL ENCOUNTER (OUTPATIENT)
Dept: BONE DENSITY | Facility: HOSPITAL | Age: 80
Discharge: HOME | End: 2025-08-25
Payer: MEDICARE

## 2025-08-25 PROCEDURE — 77080 DXA BONE DENSITY AXIAL: CPT

## (undated) DEVICE — TRY SKINPREP DRYPREP

## (undated) DEVICE — SUT GUT PLN FAST ABS 5/0 PC1 18IN 1915G

## (undated) DEVICE — SUT GUT CHRM 5/0 P3 18IN 687G

## (undated) DEVICE — NDL HYPO PRECISIONGLIDE REG 25G 1 1/2

## (undated) DEVICE — SUT VIC 6/0 P3 18IN J492G

## (undated) DEVICE — SUT VIC 5/0 P3 18IN J493G

## (undated) DEVICE — 3M™ STERI-STRIP™ COMPOUND BENZOIN TINCTURE 40 BAGS/CARTON 4 CARTONS/CASE C1544: Brand: 3M™ STERI-STRIP™

## (undated) DEVICE — ELECTRD BLD EZ CLN MOD 2.5IN

## (undated) DEVICE — EYE PAD,OVAL: Brand: CURITY

## (undated) DEVICE — STERILE COTTON TIP 6IN 10PK: Brand: MEDLINE

## (undated) DEVICE — IMMOB HD UNIV CLR DISP

## (undated) DEVICE — CROUCH CORNEAL PROTECTOR: Brand: BAUSCH + LOMB

## (undated) DEVICE — SWABSTK SKINPREP PVPI LF PK/3

## (undated) DEVICE — WIPE INST MEROCEL

## (undated) DEVICE — PK ENT 40

## (undated) DEVICE — GLV SURG BIOGEL SENSR LTX PF SZ7.5

## (undated) DEVICE — GAUZE,SPONGE,4"X4",16PLY,XRAY,STRL,LF: Brand: MEDLINE